# Patient Record
Sex: MALE | Race: WHITE | NOT HISPANIC OR LATINO | Employment: UNEMPLOYED | URBAN - METROPOLITAN AREA
[De-identification: names, ages, dates, MRNs, and addresses within clinical notes are randomized per-mention and may not be internally consistent; named-entity substitution may affect disease eponyms.]

---

## 2018-09-20 ENCOUNTER — OFFICE VISIT CONVERTED (OUTPATIENT)
Dept: FAMILY MEDICINE CLINIC | Facility: CLINIC | Age: 28
End: 2018-09-20
Attending: NURSE PRACTITIONER

## 2018-09-20 ENCOUNTER — CONVERSION ENCOUNTER (OUTPATIENT)
Dept: FAMILY MEDICINE CLINIC | Facility: CLINIC | Age: 28
End: 2018-09-20

## 2018-10-24 ENCOUNTER — CONVERSION ENCOUNTER (OUTPATIENT)
Dept: FAMILY MEDICINE CLINIC | Facility: CLINIC | Age: 28
End: 2018-10-24

## 2018-10-24 ENCOUNTER — OFFICE VISIT CONVERTED (OUTPATIENT)
Dept: FAMILY MEDICINE CLINIC | Facility: CLINIC | Age: 28
End: 2018-10-24
Attending: NURSE PRACTITIONER

## 2018-11-28 ENCOUNTER — OFFICE VISIT CONVERTED (OUTPATIENT)
Dept: FAMILY MEDICINE CLINIC | Facility: CLINIC | Age: 28
End: 2018-11-28
Attending: NURSE PRACTITIONER

## 2018-12-27 ENCOUNTER — OFFICE VISIT CONVERTED (OUTPATIENT)
Dept: FAMILY MEDICINE CLINIC | Facility: CLINIC | Age: 28
End: 2018-12-27
Attending: NURSE PRACTITIONER

## 2019-01-25 ENCOUNTER — CONVERSION ENCOUNTER (OUTPATIENT)
Dept: FAMILY MEDICINE CLINIC | Facility: CLINIC | Age: 29
End: 2019-01-25

## 2019-01-25 ENCOUNTER — OFFICE VISIT CONVERTED (OUTPATIENT)
Dept: FAMILY MEDICINE CLINIC | Facility: CLINIC | Age: 29
End: 2019-01-25
Attending: NURSE PRACTITIONER

## 2019-02-22 ENCOUNTER — OFFICE VISIT CONVERTED (OUTPATIENT)
Dept: FAMILY MEDICINE CLINIC | Facility: CLINIC | Age: 29
End: 2019-02-22
Attending: NURSE PRACTITIONER

## 2019-07-17 ENCOUNTER — OFFICE VISIT CONVERTED (OUTPATIENT)
Dept: FAMILY MEDICINE CLINIC | Facility: CLINIC | Age: 29
End: 2019-07-17
Attending: NURSE PRACTITIONER

## 2019-08-22 ENCOUNTER — OFFICE VISIT CONVERTED (OUTPATIENT)
Dept: FAMILY MEDICINE CLINIC | Facility: CLINIC | Age: 29
End: 2019-08-22
Attending: NURSE PRACTITIONER

## 2019-08-22 ENCOUNTER — HOSPITAL ENCOUNTER (OUTPATIENT)
Dept: FAMILY MEDICINE CLINIC | Facility: CLINIC | Age: 29
Discharge: HOME | End: 2019-08-22
Attending: NURSE PRACTITIONER

## 2019-08-22 LAB
ALBUMIN SERPL-MCNC: 4.1 G/DL (ref 3.5–5)
ALBUMIN/GLOB SERPL: 1.1 {RATIO} (ref 1.4–2.6)
ALP SERPL-CCNC: 106 U/L (ref 53–128)
ALT SERPL-CCNC: 79 U/L (ref 10–40)
ANION GAP SERPL CALC-SCNC: 17 MMOL/L (ref 8–19)
APPEARANCE UR: ABNORMAL
AST SERPL-CCNC: 84 U/L (ref 15–50)
BASOPHILS # BLD AUTO: 0.07 10*3/UL (ref 0–0.2)
BASOPHILS NFR BLD AUTO: 0.6 % (ref 0–3)
BILIRUB SERPL-MCNC: 0.35 MG/DL (ref 0.2–1.3)
BILIRUB UR QL: NEGATIVE
BUN SERPL-MCNC: 12 MG/DL (ref 5–25)
BUN/CREAT SERPL: 11 {RATIO} (ref 6–20)
CALCIUM SERPL-MCNC: 9.2 MG/DL (ref 8.7–10.4)
CHLORIDE SERPL-SCNC: 102 MMOL/L (ref 99–111)
CHOLEST SERPL-MCNC: 141 MG/DL (ref 107–200)
CHOLEST/HDLC SERPL: 5.9 {RATIO} (ref 3–6)
COLOR UR: ABNORMAL
CONV ABS IMM GRAN: 0.05 10*3/UL (ref 0–0.2)
CONV BACTERIA: NEGATIVE
CONV CO2: 25 MMOL/L (ref 22–32)
CONV COLLECTION SOURCE (UA): ABNORMAL
CONV CRYSTALS: ABNORMAL /[HPF]
CONV IMMATURE GRAN: 0.4 % (ref 0–1.8)
CONV TOTAL PROTEIN: 8 G/DL (ref 6.3–8.2)
CONV UROBILINOGEN IN URINE BY AUTOMATED TEST STRIP: 1 {EHRLICHU}/DL (ref 0.1–1)
CREAT UR-MCNC: 1.07 MG/DL (ref 0.7–1.2)
DEPRECATED RDW RBC AUTO: 41.8 FL (ref 35.1–43.9)
EOSINOPHIL # BLD AUTO: 0.15 10*3/UL (ref 0–0.7)
EOSINOPHIL # BLD AUTO: 1.3 % (ref 0–7)
ERYTHROCYTE [DISTWIDTH] IN BLOOD BY AUTOMATED COUNT: 13.3 % (ref 11.6–14.4)
GFR SERPLBLD BASED ON 1.73 SQ M-ARVRAT: >60 ML/MIN/{1.73_M2}
GLOBULIN UR ELPH-MCNC: 3.9 G/DL (ref 2–3.5)
GLUCOSE SERPL-MCNC: 99 MG/DL (ref 70–99)
GLUCOSE UR QL: NEGATIVE MG/DL
HCT VFR BLD AUTO: 49.7 % (ref 42–52)
HDLC SERPL-MCNC: 24 MG/DL (ref 40–60)
HGB BLD-MCNC: 15.9 G/DL (ref 14–18)
HGB UR QL STRIP: ABNORMAL
KETONES UR QL STRIP: ABNORMAL MG/DL
LDLC SERPL CALC-MCNC: 77 MG/DL (ref 70–100)
LEUKOCYTE ESTERASE UR QL STRIP: NEGATIVE
LYMPHOCYTES # BLD AUTO: 2.09 10*3/UL (ref 1–5)
LYMPHOCYTES NFR BLD AUTO: 17.9 % (ref 20–45)
MCH RBC QN AUTO: 27.5 PG (ref 27–31)
MCHC RBC AUTO-ENTMCNC: 32 G/DL (ref 33–37)
MCV RBC AUTO: 86 FL (ref 80–96)
MONOCYTES # BLD AUTO: 0.86 10*3/UL (ref 0.2–1.2)
MONOCYTES NFR BLD AUTO: 7.4 % (ref 3–10)
NEUTROPHILS # BLD AUTO: 8.47 10*3/UL (ref 2–8)
NEUTROPHILS NFR BLD AUTO: 72.4 % (ref 30–85)
NITRITE UR QL STRIP: NEGATIVE
NRBC CBCN: 0 % (ref 0–0.7)
OSMOLALITY SERPL CALC.SUM OF ELEC: 288 MOSM/KG (ref 273–304)
PH UR STRIP.AUTO: 6 [PH] (ref 5–8)
PLATELET # BLD AUTO: 322 10*3/UL (ref 130–400)
PMV BLD AUTO: 11 FL (ref 9.4–12.4)
POTASSIUM SERPL-SCNC: 4.5 MMOL/L (ref 3.5–5.3)
PROT UR QL: NEGATIVE MG/DL
RBC # BLD AUTO: 5.78 10*6/UL (ref 4.7–6.1)
RBC #/AREA URNS HPF: ABNORMAL /[HPF]
SODIUM SERPL-SCNC: 139 MMOL/L (ref 135–147)
SP GR UR: 1.03 (ref 1–1.03)
SQUAMOUS SPT QL MICRO: ABNORMAL /[HPF]
T4 FREE SERPL-MCNC: 1 NG/DL (ref 0.9–1.8)
TRIGL SERPL-MCNC: 199 MG/DL (ref 40–150)
TSH SERPL-ACNC: 3.92 M[IU]/L (ref 0.27–4.2)
VLDLC SERPL-MCNC: 40 MG/DL (ref 5–37)
WBC # BLD AUTO: 11.69 10*3/UL (ref 4.8–10.8)
WBC #/AREA URNS HPF: ABNORMAL /[HPF]

## 2019-09-25 ENCOUNTER — OFFICE VISIT CONVERTED (OUTPATIENT)
Dept: FAMILY MEDICINE CLINIC | Facility: CLINIC | Age: 29
End: 2019-09-25
Attending: NURSE PRACTITIONER

## 2019-10-29 ENCOUNTER — HOSPITAL ENCOUNTER (OUTPATIENT)
Dept: FAMILY MEDICINE CLINIC | Facility: CLINIC | Age: 29
Discharge: HOME | End: 2019-10-29
Attending: NURSE PRACTITIONER

## 2019-10-29 LAB
ANION GAP SERPL CALC-SCNC: 20 MMOL/L (ref 8–19)
BUN SERPL-MCNC: 9 MG/DL (ref 5–25)
BUN/CREAT SERPL: 8 {RATIO} (ref 6–20)
CALCIUM SERPL-MCNC: 10.1 MG/DL (ref 8.7–10.4)
CHLORIDE SERPL-SCNC: 100 MMOL/L (ref 99–111)
CONV CO2: 24 MMOL/L (ref 22–32)
CREAT UR-MCNC: 1.1 MG/DL (ref 0.7–1.2)
GFR SERPLBLD BASED ON 1.73 SQ M-ARVRAT: >60 ML/MIN/{1.73_M2}
GLUCOSE SERPL-MCNC: 78 MG/DL (ref 70–99)
OSMOLALITY SERPL CALC.SUM OF ELEC: 288 MOSM/KG (ref 273–304)
POTASSIUM SERPL-SCNC: 4.4 MMOL/L (ref 3.5–5.3)
SODIUM SERPL-SCNC: 140 MMOL/L (ref 135–147)

## 2019-10-30 ENCOUNTER — OFFICE VISIT CONVERTED (OUTPATIENT)
Dept: FAMILY MEDICINE CLINIC | Facility: CLINIC | Age: 29
End: 2019-10-30
Attending: NURSE PRACTITIONER

## 2019-10-30 ENCOUNTER — CONVERSION ENCOUNTER (OUTPATIENT)
Dept: FAMILY MEDICINE CLINIC | Facility: CLINIC | Age: 29
End: 2019-10-30

## 2020-02-03 ENCOUNTER — HOSPITAL ENCOUNTER (OUTPATIENT)
Dept: FAMILY MEDICINE CLINIC | Facility: CLINIC | Age: 30
Discharge: HOME | End: 2020-02-03
Attending: NURSE PRACTITIONER

## 2020-02-03 ENCOUNTER — CONVERSION ENCOUNTER (OUTPATIENT)
Dept: FAMILY MEDICINE CLINIC | Facility: CLINIC | Age: 30
End: 2020-02-03

## 2020-02-03 ENCOUNTER — OFFICE VISIT CONVERTED (OUTPATIENT)
Dept: FAMILY MEDICINE CLINIC | Facility: CLINIC | Age: 30
End: 2020-02-03
Attending: NURSE PRACTITIONER

## 2020-02-03 LAB
ALBUMIN SERPL-MCNC: 3.9 G/DL (ref 3.5–5)
ALBUMIN/GLOB SERPL: 1 {RATIO} (ref 1.4–2.6)
ALP SERPL-CCNC: 107 U/L (ref 53–128)
ALT SERPL-CCNC: 58 U/L (ref 10–40)
ANION GAP SERPL CALC-SCNC: 15 MMOL/L (ref 8–19)
AST SERPL-CCNC: 60 U/L (ref 15–50)
BILIRUB SERPL-MCNC: 0.33 MG/DL (ref 0.2–1.3)
BUN SERPL-MCNC: 7 MG/DL (ref 5–25)
BUN/CREAT SERPL: 7 {RATIO} (ref 6–20)
CALCIUM SERPL-MCNC: 9.6 MG/DL (ref 8.7–10.4)
CHLORIDE SERPL-SCNC: 99 MMOL/L (ref 99–111)
CHOLEST SERPL-MCNC: 150 MG/DL (ref 107–200)
CHOLEST/HDLC SERPL: 6 {RATIO} (ref 3–6)
CONV CO2: 25 MMOL/L (ref 22–32)
CONV TOTAL PROTEIN: 7.9 G/DL (ref 6.3–8.2)
CREAT UR-MCNC: 1.02 MG/DL (ref 0.7–1.2)
GFR SERPLBLD BASED ON 1.73 SQ M-ARVRAT: >60 ML/MIN/{1.73_M2}
GLOBULIN UR ELPH-MCNC: 4 G/DL (ref 2–3.5)
GLUCOSE SERPL-MCNC: 89 MG/DL (ref 70–99)
HDLC SERPL-MCNC: 25 MG/DL (ref 40–60)
LDLC SERPL CALC-MCNC: 103 MG/DL (ref 70–100)
OSMOLALITY SERPL CALC.SUM OF ELEC: 277 MOSM/KG (ref 273–304)
POTASSIUM SERPL-SCNC: 4.2 MMOL/L (ref 3.5–5.3)
PROLACTIN SERPL-MCNC: 13.83 NG/ML
SODIUM SERPL-SCNC: 135 MMOL/L (ref 135–147)
TRIGL SERPL-MCNC: 108 MG/DL (ref 40–150)
VLDLC SERPL-MCNC: 22 MG/DL (ref 5–37)

## 2021-01-28 ENCOUNTER — HOSPITAL ENCOUNTER (OUTPATIENT)
Dept: FAMILY MEDICINE CLINIC | Facility: CLINIC | Age: 31
Discharge: HOME | End: 2021-01-28
Attending: NURSE PRACTITIONER

## 2021-01-28 ENCOUNTER — OFFICE VISIT CONVERTED (OUTPATIENT)
Dept: FAMILY MEDICINE CLINIC | Facility: CLINIC | Age: 31
End: 2021-01-28
Attending: NURSE PRACTITIONER

## 2021-01-28 ENCOUNTER — CONVERSION ENCOUNTER (OUTPATIENT)
Dept: FAMILY MEDICINE CLINIC | Facility: CLINIC | Age: 31
End: 2021-01-28

## 2021-01-28 LAB
ALBUMIN SERPL-MCNC: 4 G/DL (ref 3.5–5)
ALBUMIN/GLOB SERPL: 1.1 {RATIO} (ref 1.4–2.6)
ALP SERPL-CCNC: 112 U/L (ref 53–128)
ALT SERPL-CCNC: 45 U/L (ref 10–40)
ANION GAP SERPL CALC-SCNC: 15 MMOL/L (ref 8–19)
AST SERPL-CCNC: 50 U/L (ref 15–50)
BASOPHILS # BLD AUTO: 0.05 10*3/UL (ref 0–0.2)
BASOPHILS NFR BLD AUTO: 0.5 % (ref 0–3)
BILIRUB SERPL-MCNC: 0.38 MG/DL (ref 0.2–1.3)
BUN SERPL-MCNC: 12 MG/DL (ref 5–25)
BUN/CREAT SERPL: 10 {RATIO} (ref 6–20)
CALCIUM SERPL-MCNC: 9.8 MG/DL (ref 8.7–10.4)
CHLORIDE SERPL-SCNC: 99 MMOL/L (ref 99–111)
CHOLEST SERPL-MCNC: 137 MG/DL (ref 107–200)
CHOLEST/HDLC SERPL: 5.1 {RATIO} (ref 3–6)
CONV ABS IMM GRAN: 0.04 10*3/UL (ref 0–0.2)
CONV CO2: 28 MMOL/L (ref 22–32)
CONV IMMATURE GRAN: 0.4 % (ref 0–1.8)
CONV TOTAL PROTEIN: 7.7 G/DL (ref 6.3–8.2)
CREAT UR-MCNC: 1.2 MG/DL (ref 0.7–1.2)
DEPRECATED RDW RBC AUTO: 42 FL (ref 35.1–43.9)
EOSINOPHIL # BLD AUTO: 0.13 10*3/UL (ref 0–0.7)
EOSINOPHIL # BLD AUTO: 1.2 % (ref 0–7)
ERYTHROCYTE [DISTWIDTH] IN BLOOD BY AUTOMATED COUNT: 14 % (ref 11.6–14.4)
GFR SERPLBLD BASED ON 1.73 SQ M-ARVRAT: >60 ML/MIN/{1.73_M2}
GLOBULIN UR ELPH-MCNC: 3.7 G/DL (ref 2–3.5)
GLUCOSE SERPL-MCNC: 138 MG/DL (ref 70–99)
HCT VFR BLD AUTO: 49.7 % (ref 42–52)
HDLC SERPL-MCNC: 27 MG/DL (ref 40–60)
HGB BLD-MCNC: 15.9 G/DL (ref 14–18)
LDLC SERPL CALC-MCNC: 85 MG/DL (ref 70–100)
LYMPHOCYTES # BLD AUTO: 1.69 10*3/UL (ref 1–5)
LYMPHOCYTES NFR BLD AUTO: 15.6 % (ref 20–45)
MCH RBC QN AUTO: 26.5 PG (ref 27–31)
MCHC RBC AUTO-ENTMCNC: 32 G/DL (ref 33–37)
MCV RBC AUTO: 82.8 FL (ref 80–96)
MONOCYTES # BLD AUTO: 0.49 10*3/UL (ref 0.2–1.2)
MONOCYTES NFR BLD AUTO: 4.5 % (ref 3–10)
NEUTROPHILS # BLD AUTO: 8.41 10*3/UL (ref 2–8)
NEUTROPHILS NFR BLD AUTO: 77.8 % (ref 30–85)
NRBC CBCN: 0 % (ref 0–0.7)
OSMOLALITY SERPL CALC.SUM OF ELEC: 288 MOSM/KG (ref 273–304)
PLATELET # BLD AUTO: 290 10*3/UL (ref 130–400)
PMV BLD AUTO: 10.5 FL (ref 9.4–12.4)
POTASSIUM SERPL-SCNC: 3.8 MMOL/L (ref 3.5–5.3)
RBC # BLD AUTO: 6 10*6/UL (ref 4.7–6.1)
SODIUM SERPL-SCNC: 138 MMOL/L (ref 135–147)
TRIGL SERPL-MCNC: 123 MG/DL (ref 40–150)
TSH SERPL-ACNC: 3.52 M[IU]/L (ref 0.27–4.2)
VLDLC SERPL-MCNC: 25 MG/DL (ref 5–37)
WBC # BLD AUTO: 10.81 10*3/UL (ref 4.8–10.8)

## 2021-01-31 LAB
EST. AVERAGE GLUCOSE BLD GHB EST-MCNC: 111 MG/DL
HBA1C MFR BLD: 5.5 % (ref 3.5–5.7)

## 2021-03-01 ENCOUNTER — OFFICE VISIT CONVERTED (OUTPATIENT)
Dept: FAMILY MEDICINE CLINIC | Facility: CLINIC | Age: 31
End: 2021-03-01
Attending: NURSE PRACTITIONER

## 2021-04-01 ENCOUNTER — CONVERSION ENCOUNTER (OUTPATIENT)
Dept: FAMILY MEDICINE CLINIC | Facility: CLINIC | Age: 31
End: 2021-04-01

## 2021-04-01 ENCOUNTER — OFFICE VISIT CONVERTED (OUTPATIENT)
Dept: FAMILY MEDICINE CLINIC | Facility: CLINIC | Age: 31
End: 2021-04-01
Attending: NURSE PRACTITIONER

## 2021-04-01 ENCOUNTER — HOSPITAL ENCOUNTER (OUTPATIENT)
Dept: FAMILY MEDICINE CLINIC | Facility: CLINIC | Age: 31
Discharge: HOME | End: 2021-04-01
Attending: NURSE PRACTITIONER

## 2021-04-01 LAB
25(OH)D3 SERPL-MCNC: 17.8 NG/ML (ref 30–100)
ALBUMIN SERPL-MCNC: 4.1 G/DL (ref 3.5–5)
ALBUMIN/GLOB SERPL: 1.1 {RATIO} (ref 1.4–2.6)
ALP SERPL-CCNC: 106 U/L (ref 53–128)
ALT SERPL-CCNC: 54 U/L (ref 10–40)
ANION GAP SERPL CALC-SCNC: 13 MMOL/L (ref 8–19)
APPEARANCE UR: CLEAR
AST SERPL-CCNC: 58 U/L (ref 15–50)
BASOPHILS # BLD AUTO: 0.06 10*3/UL (ref 0–0.2)
BASOPHILS NFR BLD AUTO: 0.6 % (ref 0–3)
BILIRUB SERPL-MCNC: 0.39 MG/DL (ref 0.2–1.3)
BILIRUB UR QL: NEGATIVE
BUN SERPL-MCNC: 9 MG/DL (ref 5–25)
BUN/CREAT SERPL: 8 {RATIO} (ref 6–20)
CALCIUM SERPL-MCNC: 9.3 MG/DL (ref 8.7–10.4)
CHLORIDE SERPL-SCNC: 102 MMOL/L (ref 99–111)
CHOLEST SERPL-MCNC: 150 MG/DL (ref 107–200)
CHOLEST/HDLC SERPL: 4.8 {RATIO} (ref 3–6)
COLOR UR: ABNORMAL
CONV ABS IMM GRAN: 0.05 10*3/UL (ref 0–0.2)
CONV BACTERIA: NEGATIVE
CONV CO2: 25 MMOL/L (ref 22–32)
CONV COLLECTION SOURCE (UA): ABNORMAL
CONV HYALINE CASTS IN URINE MICRO: ABNORMAL /[LPF]
CONV IMMATURE GRAN: 0.5 % (ref 0–1.8)
CONV TOTAL PROTEIN: 7.9 G/DL (ref 6.3–8.2)
CONV UROBILINOGEN IN URINE BY AUTOMATED TEST STRIP: 1 {EHRLICHU}/DL (ref 0.1–1)
CREAT UR-MCNC: 1.08 MG/DL (ref 0.7–1.2)
DEPRECATED RDW RBC AUTO: 43.1 FL (ref 35.1–43.9)
EOSINOPHIL # BLD AUTO: 0.18 10*3/UL (ref 0–0.7)
EOSINOPHIL # BLD AUTO: 1.7 % (ref 0–7)
ERYTHROCYTE [DISTWIDTH] IN BLOOD BY AUTOMATED COUNT: 14.4 % (ref 11.6–14.4)
GFR SERPLBLD BASED ON 1.73 SQ M-ARVRAT: >60 ML/MIN/{1.73_M2}
GLOBULIN UR ELPH-MCNC: 3.8 G/DL (ref 2–3.5)
GLUCOSE SERPL-MCNC: 92 MG/DL (ref 70–99)
GLUCOSE UR QL: NEGATIVE MG/DL
HCT VFR BLD AUTO: 48.8 % (ref 42–52)
HDLC SERPL-MCNC: 31 MG/DL (ref 40–60)
HGB BLD-MCNC: 15.6 G/DL (ref 14–18)
HGB UR QL STRIP: NEGATIVE
KETONES UR QL STRIP: NEGATIVE MG/DL
LDLC SERPL CALC-MCNC: 90 MG/DL (ref 70–100)
LEUKOCYTE ESTERASE UR QL STRIP: NEGATIVE
LYMPHOCYTES # BLD AUTO: 2.03 10*3/UL (ref 1–5)
LYMPHOCYTES NFR BLD AUTO: 18.9 % (ref 20–45)
MCH RBC QN AUTO: 26.5 PG (ref 27–31)
MCHC RBC AUTO-ENTMCNC: 32 G/DL (ref 33–37)
MCV RBC AUTO: 82.9 FL (ref 80–96)
MONOCYTES # BLD AUTO: 0.59 10*3/UL (ref 0.2–1.2)
MONOCYTES NFR BLD AUTO: 5.5 % (ref 3–10)
NEUTROPHILS # BLD AUTO: 7.84 10*3/UL (ref 2–8)
NEUTROPHILS NFR BLD AUTO: 72.8 % (ref 30–85)
NITRITE UR QL STRIP: NEGATIVE
NRBC CBCN: 0 % (ref 0–0.7)
OSMOLALITY SERPL CALC.SUM OF ELEC: 280 MOSM/KG (ref 273–304)
PH UR STRIP.AUTO: 5.5 [PH] (ref 5–8)
PLATELET # BLD AUTO: 347 10*3/UL (ref 130–400)
PMV BLD AUTO: 10.7 FL (ref 9.4–12.4)
POTASSIUM SERPL-SCNC: 4.4 MMOL/L (ref 3.5–5.3)
PROT UR QL: 100 MG/DL
RBC # BLD AUTO: 5.89 10*6/UL (ref 4.7–6.1)
RBC #/AREA URNS HPF: ABNORMAL /[HPF]
SODIUM SERPL-SCNC: 136 MMOL/L (ref 135–147)
SP GR UR: 1.02 (ref 1–1.03)
SQUAMOUS SPT QL MICRO: ABNORMAL /[HPF]
T4 FREE SERPL-MCNC: 1.3 NG/DL (ref 0.9–1.8)
TRIGL SERPL-MCNC: 145 MG/DL (ref 40–150)
TSH SERPL-ACNC: 2.46 M[IU]/L (ref 0.27–4.2)
VLDLC SERPL-MCNC: 29 MG/DL (ref 5–37)
WBC # BLD AUTO: 10.75 10*3/UL (ref 4.8–10.8)
WBC #/AREA URNS HPF: ABNORMAL /[HPF]

## 2021-04-02 LAB
CONV ANTI MICROSOMAL AB: <9 IU/ML (ref 0–34)
FOLATE SERPL-MCNC: 13.2 NG/ML (ref 4.8–20)
THYROGLOBULIN ANTIBODY: <1 IU/ML (ref 0–0.9)
VIT B12 SERPL-MCNC: 442 PG/ML (ref 211–911)

## 2021-04-14 ENCOUNTER — CONVERSION ENCOUNTER (OUTPATIENT)
Dept: CARDIOLOGY | Facility: CLINIC | Age: 31
End: 2021-04-14

## 2021-04-14 ENCOUNTER — OFFICE VISIT CONVERTED (OUTPATIENT)
Dept: CARDIOLOGY | Facility: CLINIC | Age: 31
End: 2021-04-14
Attending: INTERNAL MEDICINE

## 2021-05-07 NOTE — PROGRESS NOTES
Progress Note      Patient Name: Anthony Tay   Patient ID: [id number]   Sex: Male   YOB: 1990        Visit Date: April 1, 2021    Provider: RHODA Walker   Location: [facility]   Location Address: [address] [place] [address]   [place]  [address]   Location Phone: [phone]          Chief Complaint     Wellness physical       History Of Present Illness  Anthony Tay is a 30 year old /White male who presents for evaluation and treatment of:      Wellness physical     Pt has always lived with his mother who has been leaving him home for long periods of time and not taking him to appointments so he is now living on his brother's property in a 'tiny home' and is on disability through the state - got it as a child for tourettes, ADD, etc then when turned 18 he lost due to mother not filling out paperwork - then got it back 2017 for schizoaffective disorder     HTN: elevated in office at 160/100 - pt states it was previously higher - takes medication daily as prescribed - denies HA recently, previously daily headaches - feels like heart races at times - gets 'winded easily'     ADD, Schizoaffective disorder, depression: pt is established with [facility] -    Hypothyroidism: constantly hungry - obesity    Left hip pain - worse with sitting in certain position     bilateral foot pain - 'feels like a rubber band that will not stretch when he stands' - helping some on the farm       Past Medical History  Disease Name Date Onset Notes   ADD (attention deficit disorder) --  --    Depression --  --    Hypertension --  --    Lyme disease --  --    Schizoaffective disorder, chronic condition --  --    Tourette disorder --  --          Past Surgical History  Procedure Name Date Notes   *No Past Surgical History --  --          Medication List  Name Date Started Instructions   amlodipine 10 mg oral tablet  take 1 tablet (10 mg) by oral route once daily   hydralazine 50 mg oral  "tablet  take 1 tablet (50 mg) by oral route 2 times per day with food   levothyroxine 50 mcg oral capsule  take 1 capsule (50 mcg) by oral route once daily   losartan 100 mg oral tablet  take 1 tablet (100 mg) by oral route once daily   mirtazapine 7.5 mg oral tablet  take 1 tablet (7.5 mg) by oral route once daily at bedtime   Vraylar 1.5 mg oral capsule  take 1 capsule (1.5 mg) by oral route once daily         Allergy List  Allergen Name Date Reaction Notes   Latex --  --  --    Shellfish allergy --  --  --          Family Medical History  Disease Name Relative/Age Notes   Heart Disease Father/   Father passed in 40's from heart disease   Hypertension Mother/   --          Social History  Finding Status Start/Stop Quantity Notes   Tobacco Light --/-- --  --          Review of Systems  · Constitutional  o Admits  o : fatigue, chills if he gets hot  o Denies  o : fever, headache, body aches  · Cardiovascular  o Admits  o : rapid heart rate, swelling (feet, ankles, hands)  o Denies  o : chest pain  · Gastrointestinal  o Denies  o : nausea, vomiting, diarrhea, constipation  · Integument  o Admits  o : skin dryness of feet and hands, hx of sores of scalp that improved with changing shampoo  o Denies  o : hair growth change  · Neurologic  o Admits  o : tremors  · Musculoskeletal  o * See HPI  · Endocrine  o Admits  o : heat intolerance  o Denies  o : loss of hair, constipation, cold intolerance      Vitals  Date Time BP Position Site L\R Cuff Size HR RR TEMP (F) WT  HT  BMI kg/m2 BSA m2 O2 Sat FR L/min FiO2 HC       04/01/2021 09:15 /100 Sitting    77 - R  96.3 400lbs 0oz 6'  1.5\" 52.06 3.07 98 %      04/01/2021 09:54 /100 Sitting                       Physical Examination  · Constitutional  o Appearance  o : obese, well developed  · Eyes  o Conjunctivae  o : conjunctivae normal  o Pupils and Irises  o : pupils equal and round, pupils reactive to light bilaterally  · Neck  o Inspection/Palpation  o : " supple  o Thyroid  o : no thyromegaly  · Respiratory  o Respiratory Effort  o : breathing unlabored  o Auscultation of Lungs  o : clear to ascultation  · Cardiovascular  o Heart  o :   § Auscultation of Heart  § : regular rate, abnormal rhythm present, no murmurs present, no pericardial friction rub  o Peripheral Vascular System  o :   § Extremities  § : no edema  · Lymphatic  o Neck  o : no lymphadenopathy present  · Musculoskeletal  o General  o :   § General Musculoskeletal  § : tenderness with palpation of plantar surface of foot. NL ROM of left hip and nontender to palpate. Muscle tone, strength, and development grossly normal.  · Skin and Subcutaneous Tissue  o General Inspection  o : NL tone  · Neurologic  o Gait and Station  o :   § Gait Screening  § : normal gait  · Psychiatric  o Mood and Affect  o : mood normal, affect appropriate              Assessment  · Essential hypertension     401.9/I10  · Fatigue     780.79/R53.83  · Hypothyroidism     244.9/E03.9  · Major depressive disorder     296.20/F32.2  · Schizoaffective disorder     295.70/F25.9  · Hip pain, left     719.45/M25.552  · Plantar fasciitis, bilateral     728.71/M72.2  · Irregular heart rhythm     427.9/I49.9      Plan  · Orders  o HTN/Lipid Panel (CMP, Lipid) [facility] (98835, 48028) - 401.9/I10 - 04/01/2021  o CBC with Auto Diff [facility] (90457) - 401.9/I10 - 04/01/2021  o B12 Folate levels (B12FO) - 780.79/R53.83 - 04/01/2021  o Thyroid Profile (THYII) - 244.9/E03.9 - 04/01/2021  o Thyroid antithyroglobulin ab (82847) - 244.9/E03.9 - 04/01/2021  o Thyroid antibody panel (32662) - 244.9/E03.9 - 04/01/2021  o ACO-18: Positive screen for clinical depression using a standardized tool and a follow-up plan documented () - 296.20/F32.2 - 04/01/2021  o Urinalysis with Reflex Microscopy if abnormal ([facility]) (95804) - 401.9/I10 - 04/01/2021  o Vitamin D (25-Hydroxy) Level (77481) - 780.79/R53.83 - 04/01/2021  o ACO-39: Current medications updated and reviewed  (1159F, ) - - 04/01/2021  o EKG (Recording only) [facility] (00726) - 427.9/I49.9 - 04/01/2021  o CARDIOLOGY CONSULTATION (CARDI) - 427.9/I49.9 - 04/01/2021  · Medications  o diclofenac sodium 75 mg oral tablet,delayed release (DR/EC)   SIG: take 1 tablet (75 mg) by oral route 2 times per day for 30 days   DISP: (60) Tablet with 2 refills  Prescribed on 04/01/2021     o amlodipine 5 mg oral tablet   SIG: take 1 tablet (5 mg) by oral route once daily for 90 days   DISP: (90) Tablet with 0 refills  Adjusted on 04/01/2021     o hydralazine 100 mg oral tablet   SIG: take 1 tablet (100 mg) by oral route 2 times per day with food for 30 days   DISP: (60) Tablet with 2 refills  Adjusted on 04/01/2021     o levothyroxine 50 mcg oral capsule   SIG: take 1 capsule (50 mcg) by oral route once daily for 90 days   DISP: (90) Capsule with 0 refills  Adjusted on 04/01/2021     o losartan 100 mg oral tablet   SIG: take 1 tablet (100 mg) by oral route once daily for 90 days   DISP: (90) Tablet with 0 refills  Adjusted on 04/01/2021     o Medications have been Reconciled  o Transition of Care or Provider Policy  · Instructions  o Patient was educated/instructed on their diagnosis, treatment and medications prior to discharge from the clinic today.  o Peripheral edema: decrease amlodipine and increase Hydralazine - labs pending - follow up with Cardiology  o Use tennis ball and frozen water bottle to massage feet.   · Disposition  o Follow up as needed.            Electronically Signed by: RHODA Walker -Author on April 1, 2021 04:50:21 PM

## 2021-05-09 VITALS
HEART RATE: 77 BPM | WEIGHT: 315 LBS | SYSTOLIC BLOOD PRESSURE: 160 MMHG | HEIGHT: 73 IN | TEMPERATURE: 96.3 F | BODY MASS INDEX: 41.75 KG/M2 | DIASTOLIC BLOOD PRESSURE: 100 MMHG | OXYGEN SATURATION: 98 %

## 2021-05-11 NOTE — H&P
History and Physical      Patient Name: Anthony Tay   Patient ID: [id number]   Sex: Male   YOB: 1990    Primary Care Provider: Melly DUONG   Referring Provider: Melly DUONG    Visit Date: April 14, 2021    Provider: Wil Mendieta MD   Location: [facility]   Location Address: [address] [place] [address], [address]   [place]  [id number]   Location Phone: [phone]          History Of Present Illness  Consult requested by: Hillary DUONG   Anthony Tay is a 31 year old /White male that I saw in the office today. This is a 31-year-old white male. He has no previous cardiac history. He has a history of hypertension and is morbidly obese. He has had intermittent fluttering in his chest, more to the right shoulder, lasting a few seconds at a time. He has shortness of breath with exertion. He recently had an EKG in the primary care office which showed frequent PVCs and a cardiac evaluation has been requested.   PAST MEDICAL HISTORY: Morbid obesity; Hypertension; Schizoaffective.   PSYCHOSOCIAL HISTORY: Denies alcohol use. Currently smokes 1/2 pack per day. Daily caffeine.   FAMILY HISTORY: Positive for hypertension. Negative for premature CAD.   CURRENT MEDICATIONS: Mirtazapine 15 mg at night; Vraylar daily; levothyroxine 50 mcg daily; amlodipine 5 mg daily; diclofenac 75 mg b.i.d.; losartan 100 mg daily.   ALLERGIES: No known drug allergies. Allergy to shrimp.       Review of Systems  · Constitutional  o Admits  o : fatigue, good general health lately  o Denies  o : recent weight changes   · Eyes  o Denies  o : double vision  · HENT  o Denies  o : hearing loss or ringing, chronic sinus problem, swollen glands in neck  · Cardiovascular  o Admits  o : chest pain, swelling (feet, ankles, hands), shortness of breath while walking or lying flat  o Denies  o : palpitations (fast, fluttering, or skipping beats)  · Respiratory  o Denies  o : chronic or frequent  "cough, asthma or wheezing, COPD  · Gastrointestinal  o Denies  o : ulcers, nausea or vomiting  · Neurologic  o Denies  o : lightheaded or dizzy, stroke, headaches  · Musculoskeletal  o Admits  o : back pain  o Denies  o : joint pain  · Endocrine  o Admits  o : heat or cold intolerance, excessive thirst or urination  o Denies  o : thyroid disease, diabetes  · Heme-Lymph  o Denies  o : bleeding or bruising tendency, anemia      Vitals  Date Time BP Position Site L\R Cuff Size HR RR TEMP (F) WT  HT  BMI kg/m2 BSA m2 O2 Sat FR L/min FiO2 HC       04/14/2021 10:57 /102 Sitting    56 - R   400lbs 0oz 6'  5\" 47.43 3.14       04/14/2021 10:47 /88 Sitting    56 - R   400lbs 0oz 6'  5\" 47.43 3.14             Physical Examination  · Constitutional  o Appearance  o : Morbidly obese white male, pleasant, in no acute distress.  · Head and Face  o HEENT  o : No pallor, anicteric. Eyes normal. Moist mucous membranes.  · Neck  o Inspection/Palpation  o : Supple.   o Jugular Veins  o : No JVD. No carotid bruits.  · Respiratory  o Auscultation of Lungs  o : Clear to auscultation bilaterally. No crackles or wheezing.  · Cardiovascular  o Heart  o : S1, S2 is normally heard. No S3. No murmur, rubs, or gallops.  · Gastrointestinal  o Abdominal Examination  o : Soft, non-distended. No palpable hepatosplenomegaly. Bowel sounds heard in all four quadrants.  · Musculoskeletal  o General  o : Normal muscle tone and strength.  · Skin and Subcutaneous Tissue  o General Inspection  o : No skin rashes.  · Extremities  o Extremities  o : Warm and well perfused. Distal pulses present. No pitting pedal edema.  · EKG  o EKG  o : His EKG recently showed sinus rhythm with ventricular trigeminy, no ST changes.   · Labs  o Labs  o : Recent lipid panel was normal. Hemoglobin 15.6. Chemistry normal.           Assessment     1.  Abnormal EKG with frequent PVCs.  The patient is mildly symptomatic from this.   2.  Dyspnea on exertion.  3.  " Hypertension - Mildly uncontrolled today.   4.  Morbid obesity.          Plan     1.  I had a lengthy discussion today with the patient regarding a nutritional approach to weight loss.  We set up        a daily calorie guideline and some tips to help him lose weight slowly.    2.  Additionally, I counseled him for about 5 minutes on quitting smoking.  He understands the risks and        benefits and is going to give this some thought but has not yet picked a quit date.    3.  An echocardiogram and Holter monitor will be scheduled.    4.  I am going to follow up with him in 3 months otherwise.      He is agreeable with this plan.     ELISEO Mendieta MD  CBD/rt             Electronically Signed by: Selene Ortiz-, Other -Author on April 20, 2021 02:58:12 PM  Electronically Co-signed by: Wil Mendieta MD -Reviewer on April 21, 2021 06:54:41 AM

## 2021-05-13 ENCOUNTER — OFFICE VISIT CONVERTED (OUTPATIENT)
Dept: CARDIOLOGY | Facility: CLINIC | Age: 31
End: 2021-05-13
Attending: INTERNAL MEDICINE

## 2021-05-14 VITALS
SYSTOLIC BLOOD PRESSURE: 156 MMHG | HEART RATE: 72 BPM | DIASTOLIC BLOOD PRESSURE: 112 MMHG | HEIGHT: 76 IN | TEMPERATURE: 98 F | WEIGHT: 315 LBS | BODY MASS INDEX: 38.36 KG/M2 | OXYGEN SATURATION: 97 %

## 2021-05-14 VITALS
RESPIRATION RATE: 24 BRPM | BODY MASS INDEX: 38.36 KG/M2 | HEART RATE: 127 BPM | WEIGHT: 315 LBS | OXYGEN SATURATION: 94 % | DIASTOLIC BLOOD PRESSURE: 124 MMHG | SYSTOLIC BLOOD PRESSURE: 191 MMHG | HEIGHT: 76 IN | SYSTOLIC BLOOD PRESSURE: 188 MMHG | TEMPERATURE: 98.2 F | DIASTOLIC BLOOD PRESSURE: 131 MMHG

## 2021-05-14 VITALS
DIASTOLIC BLOOD PRESSURE: 102 MMHG | BODY MASS INDEX: 37.19 KG/M2 | HEIGHT: 77 IN | WEIGHT: 315 LBS | HEART RATE: 56 BPM | SYSTOLIC BLOOD PRESSURE: 138 MMHG

## 2021-05-14 NOTE — PROGRESS NOTES
"   Progress Note      Patient Name: Anthony Tay   Patient ID: [id number]   Sex: Male   YOB: 1990    Primary Care Provider: Melly DUONG   Referring Provider: Melly DUONG    Visit Date: March 1, 2021    Provider: RHODA Luis   Location: [facility]   Location Address: [address] [place] [address]  [id number]   Location Phone: [phone]          Chief Complaint  · 1 month follow up for Anxiety, Depression, HTN      History Of Present Illness  Anthony Tay is a 30 year old /White male who presents for evaluation and treatment of:      1 month follow up for Anxiety, Depression, HTN    Pt does not check b/p at home.  Pt reports h/a and eye pain have improved greatly.  Htn improved. pt reports that he has only missed 1 dose of med in the last week.     Pt has first appt with [facility] next week.  Pt reports he is out of Zoloft that was prescribed by [facility].  Pt states, \"my mom has gone off the deep end and my brother is trying to help me.\"     Pt c/o bilateral ear pain and itching.    flu shot- declined           Past Medical History  Disease Name Date Onset Notes   *Other --  Tourette's    Allergic rhinitis --  --    Anxiety --  --    Anxiety --  --    Asperger's syndrome --  --    Colon Polyps 7/18/14 --    Depression --  --    GERD 01/23/2015 --    Hematuria (Microscopic) 03/25/2014 03/25/2014 large blood, > 300 protein u/a in office on repeat from outpt labs.   Hypertension --  --    Resistant hypertension 02/03/2020 --    Tourette's disorder 03/06/2014 --          Past Surgical History  Procedure Name Date Notes   Colonoscopy 7/18/14 --    Cystoscopy 4/2014 WNL   Polypectomy 7/18/14 --          Medication List  Name Date Started Instructions   amlodipine 10 mg oral tablet 03/01/2021 take 1 tablet (10 mg) by oral route once daily for 90 days   aripiprazole 15 mg oral tablet  take 1 tablet (15 mg) by oral route once " daily   cetirizine 10 mg oral tablet 03/01/2021 take 1 tablet by oral route once a day (at bedtime) for 90 days   hydralazine 50 mg oral tablet 03/01/2021 take 1 tablet (50 mg) by oral route 2 times per day with food for 30 days   losartan 100 mg oral tablet 03/01/2021 take 1 tablet (100 mg) by oral route once daily for 90 days   metoprolol succinate 100 mg oral tablet extended release 24 hr 01/28/2021 take 1 tablet by oral route 2 times a day for 90 days   sertraline 25 mg oral tablet  take 1 tablet (25 mg) by oral route once daily   Synthroid 50 mcg oral tablet 03/01/2021 take 1 tablet (50 mcg) by oral route once daily for 90 days   triamterene-hydrochlorothiazid 37.5-25 mg oral capsule 01/28/2021 take 1 capsule by oral route once a day (in the morning) for 30 days         Allergy List  Allergen Name Date Reaction Notes   morphine --  --  --        Allergies Reconciled  Family Medical History  Disease Name Relative/Age Notes   Stroke Uncle/   --    Diabetes Mellitus, Type II Uncle/   --          Social History  Finding Status Start/Stop Quantity Notes   Alcohol Never --/-- --  --    Tobacco Former --/29 1/2 ppd --          Immunizations  NameDate Admin Mfg Trade Name Lot Number Route Inj VIS Given VIS Publication   Hepatitis A09/20/2018 SKB HAVRIX-ADULT [id number] IM  09/20/2018 10/25/2011   Comments: Pt tolerated   InfluenzaRefused 10/30/2019 NE Not Entered  NE NE     Comments: Pt declined         Review of Systems  · Constitutional  o Denies  o : fatigue, fever, weight gain, weight loss, chills  · HENT  o Denies  o : ear pain, sore throat  · Cardiovascular  o Denies  o : chest Pain, palpitations, edema (swelling)  · Respiratory  o Denies  o : frequent cough, shortness of breath  · Gastrointestinal  o Denies  o : nausea, vomiting, changes in bowel habits  · Genitourinary  o Denies  o : dysuria, urinary frequency, urinary urgency, polyuria  · Neurologic  o Denies  o : headache, tingling or numbness,  "dizziness  · Musculoskeletal  o Denies  o : joint pain, myalgias  · Endocrine  o Denies  o : polydipsia, polyphagia  · Psychiatric  o Denies  o : mood changes, memory changes, SI/HI  · Allergic-Immunologic  o Denies  o : eczema, seasonal allergies, urticaria      Vitals  Date Time BP Position Site L\R Cuff Size HR RR TEMP (F) WT  HT  BMI kg/m2 BSA m2 O2 Sat FR L/min FiO2 HC       01/28/2021 07:52 /131 Sitting    127 - R 24 98.2 394lbs 5oz 6'  4\" 48 3.1 94 %      03/01/2021 07:40 /112 Sitting    72 - R  98 399lbs 0oz 6'  4\" 48.57 3.12 97 %  21%          Physical Examination  · Constitutional  o Appearance  o : well-nourished, in no acute distress  · Eyes  o Conjunctivae  o : conjunctivae normal  o Sclerae  o : sclerae white  o Pupils and Irises  o : pupils equal and round  o Eyelids/Ocular Adnexae  o : eyelid appearance normal, no exudates present  · Ears, Nose, Mouth and Throat  o Ears  o :   § External Ears  § : external auditory canal appearance within normal limits, no discharge present  § Otoscopic Examination  § : tympanic membrane appearance within normal limits bilaterally, Left TM injected   o Nose  o :   § External Nose  § : appearance normal  § Intranasal Exam  § : mucosa within normal limits, vestibules normal, no intranasal lesions present, septum midline, sinuses non tender to palpation  § Nasopharynx  § : no discharge present  o Oral Cavity  o :   § Oral Mucosa  § : oral mucosa normal  § Lips  § : lip appearance normal  § Teeth  § : normal dentation for age  o Throat  o :   § Oropharynx  § : no inflammation or lesions present, tonsils within normal limits  · Neck  o Inspection/Palpation  o : normal appearance, no masses or tenderness, trachea midline  o Range of Motion  o : cervical range of motion within normal limits  o Thyroid  o : gland size normal, nontender, no nodules or masses present on palpation  · Respiratory  o Respiratory Effort  o : breathing unlabored  o Inspection of " Chest  o : normal appearance  o Auscultation of Lungs  o : normal breath sounds throughout inspiration and expiration  · Cardiovascular  o Heart  o :   § Auscultation of Heart  § : regular rate and rhythm, no murmurs, gallops or rubs  o Peripheral Vascular System  o :   § Carotid Arteries  § : normal pulses bilaterally, no bruits present  · Gastrointestinal  o Abdominal Examination  o : abdomen nontender to palpation, tone normal without rigidity or guarding, no masses present, bowel sounds present  · Skin and Subcutaneous Tissue  o General Inspection  o : no rashes or lesions present, no areas of discoloration  o Body Hair  o : hair normal for age, general body hair distribution normal for age  o Digits and Nails  o : no clubbing, cyanosis, deformities or edema present, normal appearing nails  · Neurologic  o Mental Status Examination  o :   § Orientation  § : grossly oriented to person, place and time  o Gait and Station  o : normal gait, able to stand without difficulty  · Psychiatric  o Judgement and Insight  o : judgment and insight intact  o Mood and Affect  o : mood normal, affect appropriate          Assessment  · Screening for depression     V79.0/Z13.89  · Allergic rhinitis due to allergen     477.9/J30.9  · Anxiety disorder     300.00/F41.9  will verify dosage Zoloft with pharmacy.   · Depression     311/F32.9  · Essential hypertension     401.9/I10  · Class 3 severe obesity due to excess calories with serious comorbidity and body mass index (BMI) of 45.0 to 49.9 in adult       Morbid (severe) obesity due to excess calories     278.01/E66.01  Body mass index [BMI] 45.0-49.9, adult     278.01/Z68.42    Problems Reconciled  Plan  · Orders  o ACO-18: Positive screen for clinical depression using a standardized tool and a follow-up plan documented () - V79.0/Z13.89 - 03/01/2021  o ACO-14: Influenza immunization was not administered for reasons documented [facility] () - - 03/01/2021   pt  declined  o ACO-39: Current medications updated and reviewed (, 1159F) - - 03/01/2021  · Medications  o hydralazine 50 mg oral tablet   SIG: take 1 tablet (50 mg) by oral route 2 times per day with food for 30 days   DISP: (60) Tablet with 1 refills  Adjusted on 03/01/2021     o Synthroid 50 mcg oral tablet   SIG: take 1 tablet (50 mcg) by oral route once daily for 90 days   DISP: (90) Tablet with 1 refills  Adjusted on 03/01/2021     o amlodipine 10 mg oral tablet   SIG: take 1 tablet (10 mg) by oral route once daily for 90 days   DISP: (90) Tablet with 1 refills  Refilled on 03/01/2021     o cetirizine 10 mg oral tablet   SIG: take 1 tablet by oral route once a day (at bedtime) for 90 days   DISP: (90) Tablet with 1 refills  Refilled on 03/01/2021     o losartan 100 mg oral tablet   SIG: take 1 tablet (100 mg) by oral route once daily for 90 days   DISP: (90) Tablet with 1 refills  Refilled on 03/01/2021     o trazodone 150 mg oral tablet   SIG: take 1 tablet (150 mg) by oral route once daily at bedtime   DISP: (180) tablets with 1 refills  Discontinued on 03/01/2021     o Medications have been Reconciled  o Transition of Care or Provider Policy  · Instructions  o Depression Screen completed and scanned into the EMR under the designated folder within the patient's documents.  o Patient was educated/instructed on their diagnosis, treatment and medications prior to discharge from the clinic today.  · Disposition  o Return to Office in 1 month.            Electronically Signed by: RHODA Luis -Author on March 1, 2021 09:03:54 AM

## 2021-05-14 NOTE — PROGRESS NOTES
Progress Note      Patient Name: Anthony Tay   Patient ID: [id number]   Sex: Male   YOB: 1990    Primary Care Provider: Melly DUONG   Referring Provider: Melly DUONG    Visit Date: January 28, 2021    Provider: RHODA Luis   Location: [facility]   Location Address: [address] [place] [address]  [id number]   Location Phone: [phone]          Chief Complaint  · Follow up for Anxiety, Depression, and HTN      History Of Present Illness  Anthony Tay is a 30 year old /White male who presents for evaluation and treatment of:      Follow up for Anxiety, Depression, and HTN  Last lab work done 2/2020  Med refills needed    Pt c/o Lt Eye pain and seeing a triangle shape when looking thru. Has not seen an eye Dr for a couple yrs.    Pt reported his mom used to help with his meds and appts, but has not done anything for the last year.  Pt has not even been able to make appts with [facility] as previously done.  His brother is helping him now.    Pt brought in his meds but only has Losartan, Levothyroxine, Amlodipine and Prazosin.  Pt wants to have med list printed.    Pt also c/o headache daily.   does not check his BP at home    Pt has not been going to [facility]. Pt reports he has appt today with [facility].       flu shot- declined         Past Medical History  Disease Name Date Onset Notes   *Other --  Tourette's    Allergic rhinitis --  --    Anxiety --  --    Anxiety --  --    Asperger's syndrome --  --    Colon Polyps 7/18/14 --    Depression --  --    GERD 01/23/2015 --    Hematuria (Microscopic) 03/25/2014 03/25/2014 large blood, > 300 protein u/a in office on repeat from outpt labs.   Hypertension --  --    Resistant hypertension 02/03/2020 --    Tourette's disorder 03/06/2014 --          Past Surgical History  Procedure Name Date Notes   Colonoscopy 7/18/14 --    Cystoscopy 4/2014 WNL   Polypectomy 7/18/14 --           Medication List  Name Date Started Instructions   amlodipine 10 mg oral tablet 01/28/2021 take 1 tablet (10 mg) by oral route once daily for 30 days   aripiprazole 15 mg oral tablet  take 1 tablet (15 mg) by oral route once daily   cetirizine 10 mg oral tablet 08/22/2019 take 1 tablet by oral route once a day (at bedtime) for 90 days   hydralazine 25 mg oral tablet 01/28/2021 TAKE 1 TABLET(25 MG) BY MOUTH TWICE DAILY WITH FOOD for 30 days   losartan 100 mg oral tablet 01/28/2021 take 1 tablet (100 mg) by oral route once daily for 30 days   metoprolol succinate 100 mg oral tablet extended release 24 hr 01/28/2021 take 1 tablet by oral route 2 times a day for 90 days   sertraline 25 mg oral tablet  take 1 tablet (25 mg) by oral route once daily   Synthroid 50 mcg oral tablet 01/28/2021 take 1 tablet (50 mcg) by oral route once daily for 30 days   trazodone 150 mg oral tablet 02/09/2016 take 1 tablet (150 mg) by oral route once daily at bedtime   triamterene-hydrochlorothiazid 37.5-25 mg oral capsule 01/28/2021 take 1 capsule by oral route once a day (in the morning) for 30 days         Allergy List  Allergen Name Date Reaction Notes   morphine --  --  --          Family Medical History  Disease Name Relative/Age Notes   Stroke Uncle/   --    Diabetes Mellitus, Type II Uncle/   --          Social History  Finding Status Start/Stop Quantity Notes   Alcohol Never --/-- --  --    Tobacco Former --/29 1/2 ppd --          Immunizations  NameDate Admin Mfg Trade Name Lot Number Route Inj VIS Given VIS Publication   Hepatitis A09/20/2018 SKB HAVRIX-ADULT [id number] IM  09/20/2018 10/25/2011   Comments: Pt tolerated   InfluenzaRefused 10/30/2019 NE Not Entered  NE NE     Comments: Pt declined         Review of Systems  · Constitutional  o Denies  o : fatigue, fever, weight gain, weight loss, chills  · Eyes  o Admits  o : eye pain, spots in vision  o Denies  o : vision loss  · Cardiovascular  o Denies  o : chest Pain,  "palpitations, edema (swelling)  · Respiratory  o Denies  o : frequent cough, shortness of breath  · Gastrointestinal  o Denies  o : nausea, vomiting, changes in bowel habits  · Genitourinary  o Denies  o : dysuria, urinary frequency, urinary urgency, polyuria  · Neurologic  o Admits  o : headache  o Denies  o : tingling or numbness, dizziness  · Psychiatric  o Admits  o : anxiety, depression, difficulty sleeping  o Denies  o : mood changes, memory changes, SI/HI      Vitals  Date Time BP Position Site L\R Cuff Size HR RR TEMP (F) WT  HT  BMI kg/m2 BSA m2 O2 Sat FR L/min FiO2 HC       02/03/2020 09:00 /128 Sitting    96 - R 24 97.7 384lbs 4oz 6'  4\" 46.77 3.06 95 %      02/03/2020 09:06 /105 Sitting    122 - R             01/28/2021 07:52 /131 Sitting    127 - R 24 98.2 394lbs 5oz 6'  4\" 48 3.1 94 %      01/28/2021 07:56 /127 Sitting    126 - R             01/28/2021 08:44 /124 Sitting                       Physical Examination  · Constitutional  o Appearance  o : well-nourished, well developed, large body habitus   · Eyes  o Conjunctivae  o : conjunctivae normal  o Sclerae  o : sclerae white  o Pupils and Irises  o : pupils equal and round  o Eyelids/Ocular Adnexae  o : eyelid appearance normal, no exudates present  · Neck  o Inspection/Palpation  o : normal appearance, no masses or tenderness, trachea midline  o Range of Motion  o : cervical range of motion within normal limits  o Thyroid  o : gland size normal, nontender, no nodules or masses present on palpation  · Respiratory  o Respiratory Effort  o : breathing unlabored  o Inspection of Chest  o : normal appearance  o Auscultation of Lungs  o : normal breath sounds throughout inspiration and expiration  · Cardiovascular  o Heart  o :   § Auscultation of Heart  § : regular rate and rhythm, no murmurs, gallops or rubs  · Gastrointestinal  o Abdominal Examination  o : abdomen nontender to palpation, tone normal without rigidity or " guarding, no masses present, bowel sounds present  · Skin and Subcutaneous Tissue  o General Inspection  o : no rashes or lesions present, no areas of discoloration  o Body Hair  o : hair normal for age, general body hair distribution normal for age  o Digits and Nails  o : no clubbing, cyanosis, deformities or edema present, normal appearing nails  · Neurologic  o Mental Status Examination  o :   § Orientation  § : grossly oriented to person, place and time  o Gait and Station  o : normal gait, able to stand without difficulty  · Psychiatric  o Judgement and Insight  o : judgment and insight intact  o Mood and Affect  o : mood normal, affect appropriate          Assessment  · Anxiety disorder     300.00/F41.9  · Depression     311/F32.9  · Essential hypertension     401.9/I10  · Hypothyroidism     244.9/E03.9  · Class 3 severe obesity due to excess calories with serious comorbidity and body mass index (BMI) of 45.0 to 49.9 in adult       Morbid (severe) obesity due to excess calories     278.01/E66.01  Body mass index [BMI] 45.0-49.9, adult     278.01/Z68.42  · Uncontrolled hypertension     401.9/I10  · Vision changes     368.9/H53.9      Plan  · Orders  o Physical, Primary Care Panel (CBC, CMP, Lipid, TSH) [facility] (77462, 82148, 41557, [address]4613) - - 01/28/2021  o ACO-14: Influenza immunization was not administered for reasons documented [facility] () - - 01/28/2021  o ACO-39: Current medications updated and reviewed (1159F, ) - - 01/28/2021  o OPHTHALMOLOGY CONSULTATION (OPHTH) - 401.9/I10, 368.9/H53.9 - 01/28/2021  o Clonidine 0.1mg Tablet [facility] () - 401.9/I10 - 01/28/2021   Administered via oral route in office at 8:05am. Mfr: [employer], Lot#[id number]/Exp:03/2023. NDC: 3372-5822-97. pt tolerated well.  · Medications  o amlodipine 10 mg oral tablet   SIG: take 1 tablet (10 mg) by oral route once daily for 30 days   DISP: (30) Tablet with 0 refills  Refilled on 01/28/2021     o hydralazine 25 mg oral  tablet   SIG: TAKE 1 TABLET(25 MG) BY MOUTH TWICE DAILY WITH FOOD for 30 days   DISP: (60) Tablet with 1 refills  Refilled on 01/28/2021     o losartan 100 mg oral tablet   SIG: take 1 tablet (100 mg) by oral route once daily for 30 days   DISP: (30) Tablet with 0 refills  Refilled on 01/28/2021     o metoprolol succinate 100 mg oral tablet extended release 24 hr   SIG: take 1 tablet by oral route 2 times a day for 90 days   DISP: (180) Tablet with 1 refills  Refilled on 01/28/2021     o Synthroid 50 mcg oral tablet   SIG: take 1 tablet (50 mcg) by oral route once daily for 30 days   DISP: (30) Tablet with 0 refills  Refilled on 01/28/2021     o triamterene-hydrochlorothiazid 37.5-25 mg oral capsule   SIG: take 1 capsule by oral route once a day (in the morning) for 30 days   DISP: (30) Capsule with 1 refills  Refilled on 01/28/2021     o Medications have been Reconciled  o Transition of Care or Provider Policy  · Instructions  o Patient was given an SSRI/SSNRI medication and warned of possible side effects of the medication including potential for increased risk of suicidal thoughts and feelings. Patient was instructed that if they begin to exhibit any of these effects they will discontinue the medication immediately and contact our office or the ER ASAP.  o Patient advised to monitor blood pressure (B/P) at home and journal readings. Patient informed that a B/P reading at home of more than 130/80 is considered hypertension. For readings greater [id number]/90 or higher patient is advised to follow up in the office with readings for management. Patient advised to limit sodium intake.  o Patient was educated/instructed on their diagnosis, treatment and medications prior to discharge from the clinic today.  o Call the office with any concerns or questions.  o Go to ER for h/a, c/p or dizziness. Go to eye dr appt today for eye pain exam as scheduled at 12:45.  o Written blood pressure titration schedule given to pt as  well as routine med list of when to take medications.   · Disposition  o Return to Office in 1 month.            Electronically Signed by: RHODA Luis -Author on January 28, 2021 09:09:05 AM

## 2021-05-15 VITALS
DIASTOLIC BLOOD PRESSURE: 94 MMHG | OXYGEN SATURATION: 96 % | SYSTOLIC BLOOD PRESSURE: 148 MMHG | WEIGHT: 315 LBS | HEIGHT: 76 IN | TEMPERATURE: 98.1 F | BODY MASS INDEX: 38.36 KG/M2 | HEART RATE: 96 BPM | RESPIRATION RATE: 24 BRPM

## 2021-05-15 VITALS
BODY MASS INDEX: 38.36 KG/M2 | HEART RATE: 88 BPM | DIASTOLIC BLOOD PRESSURE: 97 MMHG | OXYGEN SATURATION: 96 % | WEIGHT: 315 LBS | HEIGHT: 76 IN | TEMPERATURE: 97.5 F | SYSTOLIC BLOOD PRESSURE: 151 MMHG | RESPIRATION RATE: 20 BRPM

## 2021-05-15 VITALS
DIASTOLIC BLOOD PRESSURE: 104 MMHG | BODY MASS INDEX: 38.36 KG/M2 | HEIGHT: 76 IN | RESPIRATION RATE: 24 BRPM | OXYGEN SATURATION: 94 % | SYSTOLIC BLOOD PRESSURE: 154 MMHG | WEIGHT: 315 LBS | HEART RATE: 81 BPM

## 2021-05-15 VITALS
OXYGEN SATURATION: 98 % | RESPIRATION RATE: 22 BRPM | DIASTOLIC BLOOD PRESSURE: 96 MMHG | WEIGHT: 315 LBS | BODY MASS INDEX: 38.36 KG/M2 | TEMPERATURE: 98.1 F | HEIGHT: 76 IN | HEART RATE: 129 BPM | SYSTOLIC BLOOD PRESSURE: 160 MMHG

## 2021-05-15 VITALS
DIASTOLIC BLOOD PRESSURE: 128 MMHG | RESPIRATION RATE: 24 BRPM | TEMPERATURE: 97.7 F | WEIGHT: 315 LBS | OXYGEN SATURATION: 95 % | BODY MASS INDEX: 38.36 KG/M2 | HEIGHT: 76 IN | SYSTOLIC BLOOD PRESSURE: 162 MMHG | HEART RATE: 96 BPM

## 2021-05-16 VITALS — DIASTOLIC BLOOD PRESSURE: 115 MMHG | SYSTOLIC BLOOD PRESSURE: 170 MMHG

## 2021-05-16 VITALS
BODY MASS INDEX: 38.36 KG/M2 | HEART RATE: 108 BPM | SYSTOLIC BLOOD PRESSURE: 159 MMHG | OXYGEN SATURATION: 96 % | WEIGHT: 315 LBS | HEIGHT: 76 IN | RESPIRATION RATE: 21 BRPM | TEMPERATURE: 97.7 F | DIASTOLIC BLOOD PRESSURE: 120 MMHG

## 2021-05-16 VITALS
HEIGHT: 76 IN | TEMPERATURE: 97.7 F | DIASTOLIC BLOOD PRESSURE: 73 MMHG | SYSTOLIC BLOOD PRESSURE: 124 MMHG | BODY MASS INDEX: 38.36 KG/M2 | OXYGEN SATURATION: 98 % | HEART RATE: 96 BPM | WEIGHT: 315 LBS | RESPIRATION RATE: 30 BRPM

## 2021-05-16 VITALS
WEIGHT: 315 LBS | TEMPERATURE: 97.7 F | HEIGHT: 76 IN | BODY MASS INDEX: 38.36 KG/M2 | RESPIRATION RATE: 23 BRPM | SYSTOLIC BLOOD PRESSURE: 152 MMHG | DIASTOLIC BLOOD PRESSURE: 100 MMHG | HEART RATE: 100 BPM | OXYGEN SATURATION: 97 %

## 2021-05-16 VITALS
WEIGHT: 315 LBS | BODY MASS INDEX: 38.36 KG/M2 | SYSTOLIC BLOOD PRESSURE: 141 MMHG | TEMPERATURE: 97.7 F | OXYGEN SATURATION: 97 % | RESPIRATION RATE: 23 BRPM | HEART RATE: 88 BPM | HEIGHT: 76 IN | DIASTOLIC BLOOD PRESSURE: 100 MMHG

## 2021-05-16 VITALS
OXYGEN SATURATION: 99 % | RESPIRATION RATE: 24 BRPM | BODY MASS INDEX: 38.36 KG/M2 | HEART RATE: 88 BPM | DIASTOLIC BLOOD PRESSURE: 88 MMHG | SYSTOLIC BLOOD PRESSURE: 148 MMHG | HEIGHT: 76 IN | TEMPERATURE: 97.6 F | WEIGHT: 315 LBS

## 2021-05-16 VITALS
OXYGEN SATURATION: 96 % | SYSTOLIC BLOOD PRESSURE: 163 MMHG | BODY MASS INDEX: 38.36 KG/M2 | WEIGHT: 315 LBS | HEART RATE: 106 BPM | TEMPERATURE: 97.7 F | DIASTOLIC BLOOD PRESSURE: 115 MMHG | RESPIRATION RATE: 20 BRPM | HEIGHT: 76 IN

## 2021-06-05 NOTE — PROCEDURES
Procedure Note      Patient Name: Anthony Tay   Patient ID: [id number]   Sex: Male   YOB: 1990    Primary Care Provider: Melly DUONG   Referring Provider: Melly DUONG    Visit Date: May 13, 2021    Provider: Wil Mendieta MD   Location: [facility]   Location Address: [address] [place] [address], [facility]   [place], [place]  [id number]   Location Phone: [phone]          FINAL REPORT   HOLTER MONITOR REPORT  Date: 05/13/2021   Indication: Palpitations      Interpretation Date: 05/21/2021    48-HOUR HOLTER MONITOR     FINDINGS:   The patient was monitored for 48 hours. The heart rate ranged from . The average was 110 beats per minute. There were no pauses. There were no episodes of atrial fibrillation. There were 104 PACs. There were 46,271 PVCs (14.5%). These were mostly isolated PVCs. There were a few couplets and some episodes of bigeminy. There were 4 patient events. Two of these correlated with sinus rhythm and two correlated with single PVCs.     CONCLUSIONS:  1.  Sinus rhythm with frequent PVCs, the vast majority were isolated single PVCs (14.5% total).   2.  Otherwise occasional ventricular bigeminy.   3.  The patient symptoms correlated occasional with PVCs.   4.  No sustained arrhythmias observed.      ELISEO Mendieta MD   CBD/pap                 Electronically Signed by: Danica Spencer-, Other -Author on May 24, 2021 09:20:38 AM  Electronically Co-signed by: Wil Mendieta MD -Reviewer on May 24, 2021 10:25:49 AM

## 2021-06-05 NOTE — PROCEDURES
"   Procedure Note      Patient Name: Anthony Tay   Patient ID: [id number]   Sex: Male   YOB: 1990    Primary Care Provider: Melly DUONG   Referring Provider: Melly DUONG    Visit Date: May 13, 2021    Provider: Wil Mendieta MD   Location: [facility]   Location Address: [address] [place] [address], [address]   [place]  [id number]   Location Phone: [phone]          FINAL REPORT   TRANSTHORACIC ECHOCARDIOGRAM REPORT    Diagnosis: Shortness of breath and Abnormal EKG   Height: 6'5\" Weight: 400 B/P: 138/102 BSA: 3   Tech: JLW   MEASUREMENTS:  RVID (Diastole) : RVID. (NORMAL: 0.7 to 2.4 cm max)   LVID (Systole): 2.9 cm (Diastole): 4.5 cm . (NORMAL: 3.7 - 5.4 cm)   Posterior Wall Thickness (Diastole): 1.2 cm. (NORMAL: 0.8 - 1.1 cm)   Septal Thickness (Diastole): 1.2 cm. (NORMAL: 0.7 - 1.2 cm)   LAID (Systole): 3.6 cm. (NORMAL: 1.9 - 3.8 cm)   Aortic Root Diameter (Diastole): 3.3 cm. (NORMAL: 2.0 - 3.7 cm)   COMMENTS:  The patient underwent 2-D, M-Mode, and Doppler examination, including pulse-wave, continuous-wave, and color-flow analysis. The study is technically difficult due to obesity.   FINDINGS:  MITRAL VALVE: Grossly normal in structure and function.   AORTIC VALVE: Normal in structure and function.   TRICUSPID VALVE: Normal in structure and function. No evidence of pulmonary hypertension.   PULMONIC VALVE: Grossly normal.   AORTIC ROOT: Normal.   LEFT ATRIUM: Normal in size.   LEFT VENTRICLE: The left ventricular is normal in size. There is mild concentric left ventricular hypertrophy. Ejection fraction is 60%. Diastolic function measurements are normal.   RIGHT VENTRICLE: Normal size and function.   RIGHT ATRIUM: Normal in size.   PERICARDIUM: Normal.   INFERIOR VENA CAVA: Normal.   DOPPLER: E/A ratio is 1.4.DT= 165 msec. IVRT is 88 msec. E/E' is 9.   Faxed: 05/15/2021      CONCLUSIONS:  1.  Technically difficult study due to obesity.   2.  Normal biventricular " systolic function, ejection fraction 60%.   3.  Normal diastolic function.   4.  Mild left ventricular hypertrophy.  5.  No evidence of valvular dysfunction.     ELISEO Mendieta MD  CBD/rt                     Electronically Signed by: Selene Ortiz-, Other -Author on May 15, 2021 12:36:58 PM  Electronically Co-signed by: Wil Mendieta MD -Reviewer on May 16, 2021 09:58:41 AM

## 2021-07-06 RX ORDER — AMLODIPINE BESYLATE 5 MG/1
TABLET ORAL
Qty: 90 TABLET | OUTPATIENT
Start: 2021-07-06

## 2021-07-06 RX ORDER — HYDRALAZINE HYDROCHLORIDE 100 MG/1
TABLET, FILM COATED ORAL
Qty: 60 TABLET | OUTPATIENT
Start: 2021-07-06

## 2021-07-06 RX ORDER — DICLOFENAC SODIUM 75 MG/1
TABLET, DELAYED RELEASE ORAL
Qty: 60 TABLET | OUTPATIENT
Start: 2021-07-06

## 2021-07-06 RX ORDER — HYDRALAZINE HYDROCHLORIDE 50 MG/1
50 TABLET, FILM COATED ORAL 2 TIMES DAILY
COMMUNITY
End: 2021-07-08 | Stop reason: SDUPTHER

## 2021-07-06 RX ORDER — AMLODIPINE BESYLATE 10 MG/1
10 TABLET ORAL DAILY
COMMUNITY
End: 2021-12-16 | Stop reason: SDUPTHER

## 2021-07-06 RX ORDER — DICLOFENAC SODIUM 75 MG/1
75 TABLET, DELAYED RELEASE ORAL 2 TIMES DAILY
COMMUNITY
End: 2021-12-16 | Stop reason: SDUPTHER

## 2021-07-08 ENCOUNTER — OFFICE VISIT (OUTPATIENT)
Dept: FAMILY MEDICINE CLINIC | Facility: CLINIC | Age: 31
End: 2021-07-08

## 2021-07-08 VITALS
BODY MASS INDEX: 50.04 KG/M2 | TEMPERATURE: 97.7 F | WEIGHT: 315 LBS | SYSTOLIC BLOOD PRESSURE: 130 MMHG | OXYGEN SATURATION: 97 % | DIASTOLIC BLOOD PRESSURE: 86 MMHG | HEART RATE: 117 BPM

## 2021-07-08 DIAGNOSIS — R60.9 PERIPHERAL EDEMA: ICD-10-CM

## 2021-07-08 DIAGNOSIS — R53.82 CHRONIC FATIGUE: Primary | ICD-10-CM

## 2021-07-08 DIAGNOSIS — E55.9 VITAMIN D DEFICIENCY: ICD-10-CM

## 2021-07-08 DIAGNOSIS — R06.83 SNORING: ICD-10-CM

## 2021-07-08 DIAGNOSIS — H60.543 ECZEMATOID OTITIS EXTERNA OF BOTH EARS, UNSPECIFIED CHRONICITY: ICD-10-CM

## 2021-07-08 DIAGNOSIS — E66.01 MORBID (SEVERE) OBESITY DUE TO EXCESS CALORIES (HCC): ICD-10-CM

## 2021-07-08 PROBLEM — F32.9 MAJOR DEPRESSIVE DISORDER: Status: ACTIVE | Noted: 2021-04-01

## 2021-07-08 PROBLEM — F84.5 ASPERGER'S SYNDROME: Status: ACTIVE | Noted: 2021-07-08

## 2021-07-08 PROBLEM — F25.9 SCHIZOAFFECTIVE DISORDER: Status: ACTIVE | Noted: 2021-04-01

## 2021-07-08 PROBLEM — J30.9 ALLERGIC RHINITIS: Status: ACTIVE | Noted: 2021-07-08

## 2021-07-08 PROBLEM — F41.9 ANXIETY: Status: ACTIVE | Noted: 2021-07-08

## 2021-07-08 PROBLEM — E03.9 HYPOTHYROIDISM: Status: ACTIVE | Noted: 2021-04-01

## 2021-07-08 PROBLEM — I10 ESSENTIAL HYPERTENSION: Status: ACTIVE | Noted: 2021-04-01

## 2021-07-08 PROCEDURE — 99214 OFFICE O/P EST MOD 30 MIN: CPT | Performed by: NURSE PRACTITIONER

## 2021-07-08 RX ORDER — HYDRALAZINE HYDROCHLORIDE 50 MG/1
TABLET, FILM COATED ORAL
Qty: 90 TABLET | Refills: 2 | Status: SHIPPED | OUTPATIENT
Start: 2021-07-08 | End: 2021-12-16 | Stop reason: SDUPTHER

## 2021-07-08 RX ORDER — SERTRALINE HYDROCHLORIDE 25 MG/1
TABLET, FILM COATED ORAL
COMMUNITY
End: 2021-07-08 | Stop reason: ALTCHOICE

## 2021-07-08 RX ORDER — ARIPIPRAZOLE 15 MG/1
TABLET ORAL
COMMUNITY
End: 2021-07-08 | Stop reason: ALTCHOICE

## 2021-07-08 RX ORDER — ERGOCALCIFEROL 1.25 MG/1
50000 CAPSULE ORAL
Qty: 12 CAPSULE | Refills: 0 | Status: SHIPPED | OUTPATIENT
Start: 2021-07-08 | End: 2021-09-30

## 2021-07-08 RX ORDER — FLECAINIDE ACETATE 50 MG/1
TABLET ORAL
COMMUNITY
Start: 2021-05-28 | End: 2021-07-08 | Stop reason: ALTCHOICE

## 2021-07-08 RX ORDER — MIRTAZAPINE 7.5 MG/1
TABLET, FILM COATED ORAL
COMMUNITY
End: 2021-07-08 | Stop reason: ALTCHOICE

## 2021-07-08 RX ORDER — CIPROFLOXACIN AND DEXAMETHASONE 3; 1 MG/ML; MG/ML
4 SUSPENSION/ DROPS AURICULAR (OTIC) 2 TIMES DAILY
Qty: 7.5 ML | Refills: 0 | Status: SHIPPED | OUTPATIENT
Start: 2021-07-08 | End: 2021-07-15

## 2021-07-08 RX ORDER — BUPROPION HYDROCHLORIDE 100 MG/1
TABLET ORAL
COMMUNITY
End: 2021-07-08 | Stop reason: ALTCHOICE

## 2021-07-08 NOTE — PROGRESS NOTES
Chief Complaint  Fluid Retention (in wrist and legs), Fatigue (extreme drowsiness, ), Nutrition Counseling (possible dietician, help with eating), and Allergies    PHQ-2 Total Score: 1    Subjective        Past Medical History:   Diagnosis Date   • Allergic rhinitis    • Anxiety    • Asperger's syndrome    • Colon polyps 07/18/2014   • Depression    • GERD (gastroesophageal reflux disease) 01/23/2015   • Hematuria, microscopic 03/25/2014    3/25//2014 large blood, > 300 protein u/a in office on repeat from outpt labs.   • Hypertension    • Microscopic hematuria 3/25/2014   • Resistant hypertension 02/03/2020   • Tourette's    • Tourette's disorder 03/06/2014     Social History     Tobacco Use   • Smoking status: Light Tobacco Smoker     Packs/day: 0.05   • Tobacco comment: Stopped smoking at age 29; social smoker   Substance Use Topics   • Alcohol use: Never   • Drug use: Not on file      Current Outpatient Medications on File Prior to Visit   Medication Sig   • amLODIPine (NORVASC) 10 MG tablet Take 10 mg by mouth Daily.   • diclofenac (VOLTAREN) 75 MG EC tablet Take 75 mg by mouth 2 (Two) Times a Day.   • [DISCONTINUED] hydrALAZINE (APRESOLINE) 50 MG tablet Take 50 mg by mouth 2 (Two) Times a Day.   • [DISCONTINUED] ARIPiprazole (ABILIFY) 15 MG tablet aripiprazole 15 mg oral tablet take 1 tablet (15 mg) by oral route once daily   Active   • [DISCONTINUED] buPROPion (WELLBUTRIN) 100 MG tablet bupropion HCl 100 mg oral tablet take 0.5 tablet by oral route daily   Suspended   • [DISCONTINUED] Cariprazine HCl (Vraylar) 1.5 MG capsule capsule Vraylar 1.5 mg oral capsule take 1 capsule (1.5 mg) by oral route once daily   Active   • [DISCONTINUED] flecainide (TAMBOCOR) 50 MG tablet flecainide 50 mg oral tablet take 1 tablet (50 mg) by oral route every 12 hours 5/28/2021  Active   • [DISCONTINUED] mirtazapine (REMERON) 7.5 MG tablet mirtazapine 7.5 mg oral tablet take 1 tablet (7.5 mg) by oral route once daily at bedtime    Active   • [DISCONTINUED] sertraline (ZOLOFT) 25 MG tablet sertraline 25 mg oral tablet take 1 tablet (25 mg) by oral route once daily   Suspended     No current facility-administered medications on file prior to visit.      Allergies   Allergen Reactions   • Morphine Other (See Comments)   • Shellfish Allergy Swelling   • Shellfish-Derived Products Nausea And Vomiting and Swelling      Health Maintenance Due   Topic Date Due   • ANNUAL PHYSICAL  Never done   • COVID-19 Vaccine (1) Never done   • TDAP/TD VACCINES (1 - Tdap) Never done   • HEPATITIS C SCREENING  Never done      Anthony Tay presents to [facility]  Fluid retention: notes to legs and hands - drinks mainly water, has cut out soda - states he has SOB - admits to snoring and apneic episodes     Fatigue: states he will fall asleep at any time - states he will wake up feeling tired - currently on sleep aid that he takes about 9pm     Nutrition counseling: pt states he can be outside and sweat for a few days but does not have any decrease in weight - trying to reduce bread intake and eats meats, vegetables, and some potatoes - trying to reduce salt and carbohydrates     Ears are itching - denies sinus congestion or drainage      Objective   Vital Signs:   /86   Pulse 117   Temp 97.7 °F (36.5 °C)   Wt (!) 191 kg (422 lb)   SpO2 97%   BMI 50.04 kg/m²     Review of Systems   Physical Exam  Vitals reviewed.   Constitutional:       General: He is not in acute distress.     Appearance: Normal appearance. He is well-developed.   HENT:      Head: Normocephalic and atraumatic.      Right Ear: External ear normal.      Left Ear: External ear normal.      Ears:      Comments: Dry scaling skin noted to bilateral canal.   Eyes:      Conjunctiva/sclera: Conjunctivae normal.      Pupils: Pupils are equal, round, and reactive to light.   Cardiovascular:      Rate and Rhythm: Normal rate and regular rhythm.      Heart  sounds: No murmur heard.     Pulmonary:      Effort: Pulmonary effort is normal.      Breath sounds: Normal breath sounds. No wheezing or rhonchi.   Musculoskeletal:      Cervical back: Neck supple.      Right lower leg: Edema (trace) present.      Left lower leg: Edema (trace) present.   Skin:     General: Skin is warm and dry.   Neurological:      Mental Status: He is alert and oriented to person, place, and time.      Cranial Nerves: No cranial nerve deficit.   Psychiatric:         Mood and Affect: Mood and affect normal.         Behavior: Behavior normal.         Thought Content: Thought content normal.         Judgment: Judgment normal.        Result Review :   The following data was reviewed by: RHODA Lunsford on 07/08/2021:  CMP    CMP 1/28/21 1/30/21 4/1/21   Glucose 138 (A) 96 92   BUN 12 9 9   Creatinine 1.20 1.10 1.08   Sodium 138 138 136   Potassium 3.8 3.9 4.4   Chloride 99 103 102   Calcium 9.8 9.0 9.3   Albumin 4.0 3.7 4.1   Total Bilirubin 0.38 0.28 0.39   Alkaline Phosphatase 112 118 106   AST (SGOT) 50 39 58 (A)   ALT (SGPT) 45 (A) 43 (A) 54 (A)   (A) Abnormal value       Comments are available for some flowsheets but are not being displayed.           CBC    CBC 1/28/21 1/30/21 4/1/21   WBC 10.81 (A) 12.35 (A) 10.75   RBC 6.00 5.71 5.89   Hemoglobin 15.9 15.1 15.6   Hematocrit 49.7 46.6 48.8   MCV 82.8 81.6 82.9   MCH 26.5 (A) 26.4 (A) 26.5 (A)   MCHC 32.0 (A) 32.4 (A) 32.0 (A)   RDW 14.0 13.8 14.4   Platelets 290 307 347   (A) Abnormal value            TSH    TSH 1/28/21 4/1/21   TSH 3.520 2.460                     Assessment and Plan    Diagnoses and all orders for this visit:    1. Chronic fatigue (Primary)  -     Ambulatory Referral to Sleep Medicine    2. Snoring  -     Ambulatory Referral to Sleep Medicine    3. Morbid (severe) obesity due to excess calories (CMS/HCC)  -     Ambulatory Referral to Nutrition Services    4. Vitamin D deficiency  -     vitamin D (ERGOCALCIFEROL) 1.25 MG  (86330 UT) capsule capsule; Take 1 capsule by mouth Every 7 (Seven) Days.  Dispense: 12 capsule; Refill: 0    5. Eczematoid otitis externa of both ears, unspecified chronicity  -     ciprofloxacin-dexamethasone (Ciprodex) 0.3-0.1 % otic suspension; Administer 4 drops into both ears 2 (Two) Times a Day for 7 days.  Dispense: 7.5 mL; Refill: 0    6. Peripheral edema  -     hydrALAZINE (APRESOLINE) 50 MG tablet; Take 2 tablets in the AM and 1 tablet in the PM  Dispense: 90 tablet; Refill: 2    Increase Hydralazine for PE - follow up in 1 month.     Follow Up   Return in about 4 weeks (around 8/5/2021) for Recheck.  Patient was given instructions and counseling regarding his condition or for health maintenance advice. Please see specific information pulled into the AVS if appropriate.

## 2021-07-19 ENCOUNTER — HOSPITAL ENCOUNTER (EMERGENCY)
Facility: HOSPITAL | Age: 31
Discharge: HOME | End: 2021-07-19
Attending: EMERGENCY MEDICINE | Admitting: EMERGENCY MEDICINE

## 2021-07-19 ENCOUNTER — APPOINTMENT (OUTPATIENT)
Dept: GENERAL RADIOLOGY | Facility: HOSPITAL | Age: 31
End: 2021-07-19

## 2021-07-19 VITALS
BODY MASS INDEX: 37.19 KG/M2 | WEIGHT: 315 LBS | DIASTOLIC BLOOD PRESSURE: 101 MMHG | TEMPERATURE: 97.8 F | HEIGHT: 77 IN | OXYGEN SATURATION: 96 % | SYSTOLIC BLOOD PRESSURE: 126 MMHG | RESPIRATION RATE: 22 BRPM | HEART RATE: 112 BPM

## 2021-07-19 DIAGNOSIS — F41.0 ANXIETY ATTACK: Primary | ICD-10-CM

## 2021-07-19 DIAGNOSIS — I10 ESSENTIAL HYPERTENSION: ICD-10-CM

## 2021-07-19 DIAGNOSIS — R60.0 PEDAL EDEMA: ICD-10-CM

## 2021-07-19 LAB
ALBUMIN SERPL-MCNC: 4 G/DL (ref 3.5–5.2)
ALBUMIN/GLOB SERPL: 1 G/DL
ALP SERPL-CCNC: 111 U/L (ref 39–117)
ALT SERPL W P-5'-P-CCNC: 43 U/L (ref 1–41)
ANION GAP SERPL CALCULATED.3IONS-SCNC: 11.5 MMOL/L (ref 5–15)
AST SERPL-CCNC: 43 U/L (ref 1–40)
BASOPHILS # BLD AUTO: 0.08 10*3/MM3 (ref 0–0.2)
BASOPHILS NFR BLD AUTO: 0.5 % (ref 0–1.5)
BILIRUB SERPL-MCNC: 0.5 MG/DL (ref 0–1.2)
BUN SERPL-MCNC: 10 MG/DL (ref 6–20)
BUN/CREAT SERPL: 7.6 (ref 7–25)
CALCIUM SPEC-SCNC: 9 MG/DL (ref 8.6–10.5)
CHLORIDE SERPL-SCNC: 100 MMOL/L (ref 98–107)
CO2 SERPL-SCNC: 24.5 MMOL/L (ref 22–29)
CREAT SERPL-MCNC: 1.32 MG/DL (ref 0.76–1.27)
DEPRECATED RDW RBC AUTO: 40 FL (ref 37–54)
EOSINOPHIL # BLD AUTO: 0.14 10*3/MM3 (ref 0–0.4)
EOSINOPHIL NFR BLD AUTO: 0.9 % (ref 0.3–6.2)
ERYTHROCYTE [DISTWIDTH] IN BLOOD BY AUTOMATED COUNT: 13.8 % (ref 12.3–15.4)
GFR SERPL CREATININE-BSD FRML MDRD: 63 ML/MIN/1.73
GLOBULIN UR ELPH-MCNC: 4.1 GM/DL
GLUCOSE SERPL-MCNC: 106 MG/DL (ref 65–99)
HCT VFR BLD AUTO: 46.1 % (ref 37.5–51)
HGB BLD-MCNC: 15.2 G/DL (ref 13–17.7)
HOLD SPECIMEN: NORMAL
HOLD SPECIMEN: NORMAL
IMM GRANULOCYTES # BLD AUTO: 0.09 10*3/MM3 (ref 0–0.05)
IMM GRANULOCYTES NFR BLD AUTO: 0.6 % (ref 0–0.5)
LYMPHOCYTES # BLD AUTO: 1.76 10*3/MM3 (ref 0.7–3.1)
LYMPHOCYTES NFR BLD AUTO: 10.9 % (ref 19.6–45.3)
MCH RBC QN AUTO: 26.6 PG (ref 26.6–33)
MCHC RBC AUTO-ENTMCNC: 33 G/DL (ref 31.5–35.7)
MCV RBC AUTO: 80.6 FL (ref 79–97)
MONOCYTES # BLD AUTO: 0.88 10*3/MM3 (ref 0.1–0.9)
MONOCYTES NFR BLD AUTO: 5.5 % (ref 5–12)
NEUTROPHILS NFR BLD AUTO: 13.19 10*3/MM3 (ref 1.7–7)
NEUTROPHILS NFR BLD AUTO: 81.6 % (ref 42.7–76)
NRBC BLD AUTO-RTO: 0 /100 WBC (ref 0–0.2)
NT-PROBNP SERPL-MCNC: 271.3 PG/ML (ref 0–450)
PLATELET # BLD AUTO: 313 10*3/MM3 (ref 140–450)
PMV BLD AUTO: 9.8 FL (ref 6–12)
POTASSIUM SERPL-SCNC: 4.1 MMOL/L (ref 3.5–5.2)
PROT SERPL-MCNC: 8.1 G/DL (ref 6–8.5)
QT INTERVAL: 319 MS
RBC # BLD AUTO: 5.72 10*6/MM3 (ref 4.14–5.8)
SODIUM SERPL-SCNC: 136 MMOL/L (ref 136–145)
TROPONIN T SERPL-MCNC: <0.01 NG/ML (ref 0–0.03)
WBC # BLD AUTO: 16.14 10*3/MM3 (ref 3.4–10.8)
WHOLE BLOOD HOLD SPECIMEN: NORMAL

## 2021-07-19 PROCEDURE — 25010000002 FUROSEMIDE PER 20 MG: Performed by: EMERGENCY MEDICINE

## 2021-07-19 PROCEDURE — 25010000002 LORAZEPAM PER 2 MG: Performed by: EMERGENCY MEDICINE

## 2021-07-19 PROCEDURE — 83880 ASSAY OF NATRIURETIC PEPTIDE: CPT

## 2021-07-19 PROCEDURE — 96375 TX/PRO/DX INJ NEW DRUG ADDON: CPT

## 2021-07-19 PROCEDURE — 96374 THER/PROPH/DIAG INJ IV PUSH: CPT

## 2021-07-19 PROCEDURE — 71045 X-RAY EXAM CHEST 1 VIEW: CPT

## 2021-07-19 PROCEDURE — 80053 COMPREHEN METABOLIC PANEL: CPT

## 2021-07-19 PROCEDURE — 84484 ASSAY OF TROPONIN QUANT: CPT

## 2021-07-19 PROCEDURE — 99284 EMERGENCY DEPT VISIT MOD MDM: CPT

## 2021-07-19 PROCEDURE — 93005 ELECTROCARDIOGRAM TRACING: CPT

## 2021-07-19 PROCEDURE — 36415 COLL VENOUS BLD VENIPUNCTURE: CPT

## 2021-07-19 PROCEDURE — 85025 COMPLETE CBC W/AUTO DIFF WBC: CPT

## 2021-07-19 PROCEDURE — 93010 ELECTROCARDIOGRAM REPORT: CPT | Performed by: INTERNAL MEDICINE

## 2021-07-19 RX ORDER — LORAZEPAM 2 MG/ML
1 INJECTION INTRAMUSCULAR ONCE
Status: COMPLETED | OUTPATIENT
Start: 2021-07-19 | End: 2021-07-19

## 2021-07-19 RX ORDER — LABETALOL HYDROCHLORIDE 5 MG/ML
10 INJECTION, SOLUTION INTRAVENOUS ONCE
Status: COMPLETED | OUTPATIENT
Start: 2021-07-19 | End: 2021-07-19

## 2021-07-19 RX ORDER — SODIUM CHLORIDE 0.9 % (FLUSH) 0.9 %
10 SYRINGE (ML) INJECTION AS NEEDED
Status: DISCONTINUED | OUTPATIENT
Start: 2021-07-19 | End: 2021-07-19 | Stop reason: HOSPADM

## 2021-07-19 RX ORDER — FUROSEMIDE 10 MG/ML
20 INJECTION INTRAMUSCULAR; INTRAVENOUS ONCE
Status: COMPLETED | OUTPATIENT
Start: 2021-07-19 | End: 2021-07-19

## 2021-07-19 RX ADMIN — FUROSEMIDE 20 MG: 10 INJECTION, SOLUTION INTRAMUSCULAR; INTRAVENOUS at 16:21

## 2021-07-19 RX ADMIN — LABETALOL 20 MG/4 ML (5 MG/ML) INTRAVENOUS SYRINGE 10 MG: at 16:23

## 2021-07-19 RX ADMIN — LORAZEPAM 1 MG: 2 INJECTION INTRAMUSCULAR; INTRAVENOUS at 16:17

## 2021-07-19 RX ADMIN — SODIUM CHLORIDE, PRESERVATIVE FREE 10 ML: 5 INJECTION INTRAVENOUS at 16:25

## 2021-07-19 RX ADMIN — SODIUM CHLORIDE, PRESERVATIVE FREE 10 ML: 5 INJECTION INTRAVENOUS at 16:19

## 2021-07-19 RX ADMIN — SODIUM CHLORIDE, PRESERVATIVE FREE 10 ML: 5 INJECTION INTRAVENOUS at 16:22

## 2021-07-19 NOTE — DISCHARGE INSTRUCTIONS
No signs of a heart attack or anything life-threatening were found, but your blood pressure here was too high and you have some swelling in your legs.    Keep taking your current blood pressure meds and also start taking the low-dose water pill called Lasix once daily this week and stay on a low salt and low sodium diet.    Call the cardiology clinic for a follow-up appointment to see Dr. Cesar in the next week or so for further management of your high blood pressure and leg swelling.

## 2021-07-19 NOTE — ED PROVIDER NOTES
Time: 2:40 PM EDT  Arrived by: EMS  Chief Complaint: private vehicle  History provided by: patient and family  History is limited by: N/A    History of Present Illness:  Patient is a 31 y.o. year old male that presents to the emergency department with shortness of breath and left arm pain. Pt family reports that pt was outside and thought it maybe related to being out in the heat. Pt notes he was hyperventilating.     Pt has a history of A-fib.       Arm Pain  Location:  Left  Severity:  Mild  Onset quality:  Sudden  Duration:  2 hours  Timing:  Constant  Progression:  Unable to specify  Chronicity:  New  Relieved by:  Nothing  Worsened by:  Nothing  Ineffective treatments:  None tried  Associated symptoms: shortness of breath    Associated symptoms: no chest pain, no cough, no diarrhea, no fever, no rash and no vomiting    Shortness of breath:     Severity:  Mild    Onset quality:  Sudden    Duration:  2 hours    Timing:  Constant    Progression:  Unable to specify          Similar Symptoms Previously: no  Recently seen: yes      Patient Care Team  Primary Care Provider: Hillary Worthington APRN      Past Medical History:     Allergies   Allergen Reactions   • Morphine Other (See Comments)   • Shellfish Allergy Swelling   • Shellfish-Derived Products Nausea And Vomiting and Swelling     Past Medical History:   Diagnosis Date   • Allergic rhinitis    • Anxiety    • Asperger's syndrome    • Colon polyps 07/18/2014   • Depression    • GERD (gastroesophageal reflux disease) 01/23/2015   • Hematuria, microscopic 03/25/2014    3/25//2014 large blood, > 300 protein u/a in office on repeat from outpt labs.   • Hypertension    • Microscopic hematuria 3/25/2014   • Resistant hypertension 02/03/2020   • Tourette's    • Tourette's disorder 03/06/2014     Past Surgical History:   Procedure Laterality Date   • COLONOSCOPY  07/18/2014   • CYSTOSCOPY  04/2014    WNL   • POLYPECTOMY  07/18/2014     Family History   Problem Relation  "Age of Onset   • Stroke Other    • Diabetes type II Other        Home Medications:  Prior to Admission medications    Medication Sig Start Date End Date Taking? Authorizing Provider   amLODIPine (NORVASC) 10 MG tablet Take 10 mg by mouth Daily.    Provider, MD Yobany   diclofenac (VOLTAREN) 75 MG EC tablet Take 75 mg by mouth 2 (Two) Times a Day.    Provider, Yobany, MD   hydrALAZINE (APRESOLINE) 50 MG tablet Take 2 tablets in the AM and 1 tablet in the PM 7/8/21   Hillary Worthington APRN   vitamin D (ERGOCALCIFEROL) 1.25 MG (32343 UT) capsule capsule Take 1 capsule by mouth Every 7 (Seven) Days. 7/8/21   Hillary Worthington APRN        Social History:   PT  reports that he has been smoking. He has been smoking about 0.05 packs per day. He does not have any smokeless tobacco history on file. He reports previous drug use. He reports that he does not drink alcohol.    Record Review:  I have reviewed the patient's records in [facility].     Review of Systems  Review of Systems   Constitutional: Negative for chills and fever.   HENT: Negative for nosebleeds.    Eyes: Negative for redness.   Respiratory: Positive for shortness of breath. Negative for cough.    Cardiovascular: Negative for chest pain.   Gastrointestinal: Negative for diarrhea and vomiting.   Genitourinary: Negative for dysuria and frequency.   Musculoskeletal: Negative for back pain and neck pain.   Skin: Negative for rash.   Neurological: Negative for seizures.        Physical Exam  BP (!) 126/101   Pulse 112   Temp 97.8 °F (36.6 °C)   Resp 22   Ht 195.6 cm (77\")   Wt (!) 192 kg (422 lb 13.5 oz)   SpO2 96%   BMI 50.14 kg/m²     Physical Exam  Vitals and nursing note reviewed.   Constitutional:       General: He is awake. He is not in acute distress.     Appearance: He is obese.   HENT:      Head: Normocephalic and atraumatic.      Nose: Nose normal.      Mouth/Throat:      Mouth: Mucous membranes are moist.   Eyes:      General: No scleral " "icterus.     Pupils: Pupils are equal, round, and reactive to light.   Cardiovascular:      Rate and Rhythm: Regular rhythm. Tachycardia present.      Pulses: Normal pulses.      Heart sounds: Normal heart sounds. No murmur heard.     Pulmonary:      Effort: Pulmonary effort is normal. No respiratory distress.      Breath sounds: Normal breath sounds and air entry. No decreased air movement. No decreased breath sounds, wheezing, rhonchi or rales.   Chest:      Chest wall: No tenderness.   Abdominal:      Palpations: Abdomen is soft.      Tenderness: There is no abdominal tenderness. There is no guarding or rebound.      Hernia: No hernia is present.   Musculoskeletal:         General: No tenderness. Normal range of motion.      Cervical back: Normal range of motion and neck supple. No tenderness.      Right lower le+ Pitting Edema (lower leg) present.      Left lower le+ Pitting Edema (lower leg) present.   Skin:     General: Skin is warm and dry.      Findings: No rash.   Neurological:      Mental Status: He is alert and oriented to person, place, and time. Mental status is at baseline.      Sensory: Sensation is intact. No sensory deficit.      Motor: Motor function is intact. No weakness.   Psychiatric:         Behavior: Behavior normal.                  ED Course  BP (!) 126/101   Pulse 112   Temp 97.8 °F (36.6 °C)   Resp 22   Ht 195.6 cm (77\")   Wt (!) 192 kg (422 lb 13.5 oz)   SpO2 96%   BMI 50.14 kg/m²   Results for orders placed or performed during the hospital encounter of 21   Comprehensive Metabolic Panel    Specimen: Blood   Result Value Ref Range    Glucose 106 (H) 65 - 99 mg/dL    BUN 10 6 - 20 mg/dL    Creatinine 1.32 (H) 0.76 - 1.27 mg/dL    Sodium 136 136 - 145 mmol/L    Potassium 4.1 3.5 - 5.2 mmol/L    Chloride 100 98 - 107 mmol/L    CO2 24.5 22.0 - 29.0 mmol/L    Calcium 9.0 8.6 - 10.5 mg/dL    Total Protein 8.1 6.0 - 8.5 g/dL    Albumin 4.00 3.50 - 5.20 g/dL    ALT (SGPT) 43 " (H) 1 - 41 U/L    AST (SGOT) 43 (H) 1 - 40 U/L    Alkaline Phosphatase 111 39 - 117 U/L    Total Bilirubin 0.5 0.0 - 1.2 mg/dL    eGFR Non African Amer 63 >60 mL/min/1.73    Globulin 4.1 gm/dL    A/G Ratio 1.0 g/dL    BUN/Creatinine Ratio 7.6 7.0 - 25.0    Anion Gap 11.5 5.0 - 15.0 mmol/L   BNP    Specimen: Blood   Result Value Ref Range    proBNP 271.3 0.0 - 450.0 pg/mL   Troponin    Specimen: Blood   Result Value Ref Range    Troponin T <0.010 0.000 - 0.030 ng/mL   CBC Auto Differential    Specimen: Blood   Result Value Ref Range    WBC 16.14 (H) 3.40 - 10.80 10*3/mm3    RBC 5.72 4.14 - 5.80 10*6/mm3    Hemoglobin 15.2 13.0 - 17.7 g/dL    Hematocrit 46.1 37.5 - 51.0 %    MCV 80.6 79.0 - 97.0 fL    MCH 26.6 26.6 - 33.0 pg    MCHC 33.0 31.5 - 35.7 g/dL    RDW 13.8 12.3 - 15.4 %    RDW-SD 40.0 37.0 - 54.0 fl    MPV 9.8 6.0 - 12.0 fL    Platelets 313 140 - 450 10*3/mm3    Neutrophil % 81.6 (H) 42.7 - 76.0 %    Lymphocyte % 10.9 (L) 19.6 - 45.3 %    Monocyte % 5.5 5.0 - 12.0 %    Eosinophil % 0.9 0.3 - 6.2 %    Basophil % 0.5 0.0 - 1.5 %    Immature Grans % 0.6 (H) 0.0 - 0.5 %    Neutrophils, Absolute 13.19 (H) 1.70 - 7.00 10*3/mm3    Lymphocytes, Absolute 1.76 0.70 - 3.10 10*3/mm3    Monocytes, Absolute 0.88 0.10 - 0.90 10*3/mm3    Eosinophils, Absolute 0.14 0.00 - 0.40 10*3/mm3    Basophils, Absolute 0.08 0.00 - 0.20 10*3/mm3    Immature Grans, Absolute 0.09 (H) 0.00 - 0.05 10*3/mm3    nRBC 0.0 0.0 - 0.2 /100 WBC   ECG 12 Lead   Result Value Ref Range    QT Interval 319 ms   Green Top (Gel)   Result Value Ref Range    Extra Tube Hold for add-ons.    Lavender Top   Result Value Ref Range    Extra Tube hold for add-on    Gold Top - SST   Result Value Ref Range    Extra Tube Hold for add-ons.      Medications   furosemide (LASIX) injection 20 mg (20 mg Intravenous Given 7/19/21 1621)   LORazepam (ATIVAN) injection 1 mg (1 mg Intravenous Given 7/19/21 1617)   labetalol (NORMODYNE,TRANDATE) injection 10 mg (10 mg  Intravenous Given 7/19/21 1623)     XR Chest 1 View    Result Date: 7/19/2021  Narrative: PROCEDURE: XR CHEST 1 VW  COMPARISON: None  INDICATIONS: SHORTNESS OF BREATH/COUGH  FINDINGS:   The lungs are well-expanded. The heart and pulmonary vasculature are within normal limits. No pleural effusions are identified. There are no active appearing infiltrates.  No evidence of pneumothorax.  IMPRESSION: No active disease.  IRLANDA BROWER MD       Electronically Signed and Approved By: IRLANDA BROWER MD on 7/19/2021 at 15:01               Procedures/EKGs:  Procedures    EKG:    Rhythm: sinus tachycardia  Rate: 120  Axis: q waves present V1, V2, V3  Intervals: normal  ST Segment: no ST elevation  No acute ischemia  EKG Comparison: unchanged from previous on January 2021    Interpreted by me        Medical Decision Making:                     MDM     Differential diagnosis for this patient with dyspnea includes asthma or COPD exacerbation, CHF exacerbation, bronchitis or pneumonia, pneumothorax, pleural effusion or pulmonary edema, or less likely MI or PE.        This patient is a pleasant 31-year-old male with history of hypertension and morbid obesity who presents after he became dizzy today and started having some sharp pain in his left arm, and then started hyperventilating and essentially having a panic attack.    All of his lab work including heart enzymes and proBNP and his chest x-ray came back normal today.    He does have significantly elevated blood pressure, which she states is chronic, and I will give him a dose of labetalol here and also a bit of Ativan for his anxiety attack.    On reassessment he looks improved and I will start him on some low-dose Lasix as he has some pitting extremity edema, and also recommend a low-sodium diet.    I will refer him to follow-up with cardiologist given his difficult to treat hypertension in the setting of being on multiple blood pressure meds and still poorly  controlled.    Patient currently is feeling better and back to baseline and denies any shortness of breath or chest pain currently.    Final diagnoses:   Anxiety attack   Essential hypertension   Pedal edema        Disposition:  ED Disposition     ED Disposition Condition Comment    Discharge Stable           Documentation assistance provided by Dixie Barnett acting as scribe for Roosevelt Hoang MD. Information recorded by the scribe was done at my direction and has been verified and validated by me.        Dixie Barnett  07/19/21 [id number]       Roosevelt Hoang MD  07/19/21 [id number]

## 2021-07-27 RX ORDER — FLECAINIDE ACETATE 50 MG/1
TABLET ORAL
Qty: 60 TABLET | Refills: 7 | Status: SHIPPED | OUTPATIENT
Start: 2021-07-27

## 2021-09-07 RX ORDER — AMLODIPINE BESYLATE 10 MG/1
TABLET ORAL
Qty: 90 TABLET | OUTPATIENT
Start: 2021-09-07

## 2021-09-28 DIAGNOSIS — E55.9 VITAMIN D DEFICIENCY: ICD-10-CM

## 2021-09-30 RX ORDER — ERGOCALCIFEROL 1.25 MG/1
CAPSULE ORAL
Qty: 12 CAPSULE | Refills: 0 | Status: SHIPPED | OUTPATIENT
Start: 2021-09-30

## 2021-10-28 DIAGNOSIS — R60.9 PERIPHERAL EDEMA: ICD-10-CM

## 2021-11-01 RX ORDER — HYDRALAZINE HYDROCHLORIDE 50 MG/1
TABLET, FILM COATED ORAL
Qty: 90 TABLET | Refills: 2 | OUTPATIENT
Start: 2021-11-01

## 2021-12-16 ENCOUNTER — OFFICE VISIT (OUTPATIENT)
Dept: FAMILY MEDICINE CLINIC | Facility: CLINIC | Age: 31
End: 2021-12-16

## 2021-12-16 VITALS
HEIGHT: 76 IN | OXYGEN SATURATION: 96 % | WEIGHT: 315 LBS | DIASTOLIC BLOOD PRESSURE: 78 MMHG | BODY MASS INDEX: 38.36 KG/M2 | TEMPERATURE: 97.3 F | SYSTOLIC BLOOD PRESSURE: 130 MMHG | HEART RATE: 120 BPM

## 2021-12-16 DIAGNOSIS — R06.83 SNORING: ICD-10-CM

## 2021-12-16 DIAGNOSIS — E55.9 VITAMIN D DEFICIENCY: ICD-10-CM

## 2021-12-16 DIAGNOSIS — R53.83 FATIGUE, UNSPECIFIED TYPE: ICD-10-CM

## 2021-12-16 DIAGNOSIS — R06.02 SHORTNESS OF BREATH: ICD-10-CM

## 2021-12-16 DIAGNOSIS — I10 ESSENTIAL HYPERTENSION: Primary | ICD-10-CM

## 2021-12-16 DIAGNOSIS — E66.01 MORBID (SEVERE) OBESITY DUE TO EXCESS CALORIES (HCC): ICD-10-CM

## 2021-12-16 DIAGNOSIS — R53.82 CHRONIC FATIGUE: ICD-10-CM

## 2021-12-16 DIAGNOSIS — E03.9 HYPOTHYROIDISM, UNSPECIFIED TYPE: ICD-10-CM

## 2021-12-16 DIAGNOSIS — R60.9 PERIPHERAL EDEMA: ICD-10-CM

## 2021-12-16 PROCEDURE — 99214 OFFICE O/P EST MOD 30 MIN: CPT | Performed by: NURSE PRACTITIONER

## 2021-12-16 RX ORDER — LOSARTAN POTASSIUM 100 MG/1
100 TABLET ORAL DAILY
Qty: 90 TABLET | Refills: 1 | Status: SHIPPED | OUTPATIENT
Start: 2021-12-16 | End: 2022-10-25 | Stop reason: SDUPTHER

## 2021-12-16 RX ORDER — LEVOTHYROXINE SODIUM 0.05 MG/1
50 TABLET ORAL DAILY
Qty: 90 TABLET | Refills: 1 | Status: SHIPPED | OUTPATIENT
Start: 2021-12-16 | End: 2022-10-25 | Stop reason: SDUPTHER

## 2021-12-16 RX ORDER — HYDRALAZINE HYDROCHLORIDE 50 MG/1
TABLET, FILM COATED ORAL
Qty: 90 TABLET | Refills: 2 | Status: SHIPPED | OUTPATIENT
Start: 2021-12-16 | End: 2022-03-16

## 2021-12-16 RX ORDER — AMLODIPINE BESYLATE 10 MG/1
10 TABLET ORAL DAILY
Qty: 90 TABLET | Refills: 1 | Status: SHIPPED | OUTPATIENT
Start: 2021-12-16 | End: 2022-10-25 | Stop reason: SDUPTHER

## 2021-12-16 RX ORDER — DICLOFENAC SODIUM 75 MG/1
75 TABLET, DELAYED RELEASE ORAL 2 TIMES DAILY
Qty: 180 TABLET | Refills: 1 | Status: SHIPPED | OUTPATIENT
Start: 2021-12-16 | End: 2022-10-25 | Stop reason: SDUPTHER

## 2021-12-16 RX ORDER — MIRTAZAPINE 45 MG/1
1 TABLET, ORALLY DISINTEGRATING ORAL
COMMUNITY
Start: 2021-11-24

## 2021-12-16 RX ORDER — CARIPRAZINE 3 MG/1
3 CAPSULE, GELATIN COATED ORAL EVERY MORNING
COMMUNITY
Start: 2021-11-26 | End: 2022-10-25 | Stop reason: DRUGHIGH

## 2021-12-16 NOTE — PROGRESS NOTES
Chief Complaint  Hypertension (refills) and Hypothyroidism    Subjective        Past Medical History:   Diagnosis Date   • Allergic rhinitis    • Anxiety    • Asperger's syndrome    • Colon polyps 07/18/2014   • Depression    • GERD (gastroesophageal reflux disease) 01/23/2015   • Hematuria, microscopic 03/25/2014    3/25//2014 large blood, > 300 protein u/a in office on repeat from outpt labs.   • Hypertension    • Microscopic hematuria 3/25/2014   • Resistant hypertension 02/03/2020   • Tourette's    • Tourette's disorder 03/06/2014     Social History     Tobacco Use   • Smoking status: Current Some Day Smoker     Packs/day: 0.05   • Smokeless tobacco: Never Used   • Tobacco comment: Stopped smoking at age 29; social smoker   Vaping Use   • Vaping Use: Never used   Substance Use Topics   • Alcohol use: Never   • Drug use: Not Currently      Current Outpatient Medications on File Prior to Visit   Medication Sig   • flecainide (TAMBOCOR) 50 MG tablet TAKE 1 TABLET(50 MG) BY MOUTH EVERY 12 HOURS   • mirtazapine (REMERON SOL-TAB) 45 MG disintegrating tablet Place 1 tablet on the tongue every night at bedtime.   • vitamin D (ERGOCALCIFEROL) 1.25 MG (48387 UT) capsule capsule TAKE 1 CAPSULE BY MOUTH EVERY 7 DAYS   • Vraylar 3 MG capsule capsule Take 3 mg by mouth Every Morning.   • [DISCONTINUED] amLODIPine (NORVASC) 10 MG tablet Take 10 mg by mouth Daily.   • [DISCONTINUED] diclofenac (VOLTAREN) 75 MG EC tablet Take 75 mg by mouth 2 (Two) Times a Day.   • [DISCONTINUED] hydrALAZINE (APRESOLINE) 50 MG tablet Take 2 tablets in the AM and 1 tablet in the PM   • [DISCONTINUED] levothyroxine (SYNTHROID, LEVOTHROID) 50 MCG tablet Take 50 mcg by mouth Daily.   • [DISCONTINUED] losartan (COZAAR) 100 MG tablet Take 100 mg by mouth Daily.   • [DISCONTINUED] mirtazapine (REMERON) 15 MG tablet Take 15 mg by mouth every night at bedtime.     No current facility-administered medications on file prior to visit.      Allergies  Refill Request for:  OMEPRAZOLE DR 40MG CAPSULE  **REQUESTING 90-DAY SUPPLY**    Last Office Visit:  03/02/2020  Upcoming Office Visit:  None  Labs Last Completed: 02/29/2020    Medication Last Filled:  03/02/2020      Pharmacy Phone #:  [phone]  Pharmacy Fax #:  [phone]     "  Allergen Reactions   • Shellfish Allergy Swelling   • Morphine Other (See Comments)     unk      Health Maintenance Due   Topic Date Due   • ANNUAL PHYSICAL  Never done   • COVID-19 Vaccine (1) Never done   • Pneumococcal Vaccine 0-64 (1 of 2 - PPSV23) Never done   • TDAP/TD VACCINES (1 - Tdap) Never done   • HEPATITIS C SCREENING  Never done   • INFLUENZA VACCINE  Never done      Anthony Tay presents to [facility]  Chronic conditions    HTN: stable in office.     He is established is [facility] but states they are not consistent with calling. Mood is stable per pt and brother. Improved since living with mother.     SOB: worse with going outside in the cold air. He has seen Cardiology and dxd with irregularity. Sleeps from about 9a-9p but will wake throughout the night. Pts brother states sleep medicine called and stated he did not need a sleep study. Pt states he does snore. Unknown apnea episodes. Pt states he will fall asleep throughout the day.       Objective   Vital Signs:   /78   Pulse 120   Temp 97.3 °F (36.3 °C)   Ht 193 cm (76\")   Wt (!) 196 kg (431 lb)   SpO2 96%   BMI 52.46 kg/m²     Review of Systems   Constitutional: Positive for fatigue.   Respiratory: Positive for shortness of breath.    Cardiovascular: Positive for palpitations (rapid heart with activity). Negative for chest pain.      Physical Exam  Vitals reviewed.   Constitutional:       General: He is not in acute distress.     Appearance: Normal appearance. He is well-developed. He is obese.   HENT:      Head: Normocephalic and atraumatic.      Right Ear: External ear normal.      Left Ear: External ear normal.   Eyes:      Conjunctiva/sclera: Conjunctivae normal.      Pupils: Pupils are equal, round, and reactive to light.   Cardiovascular:      Rate and Rhythm: Normal rate and regular rhythm.      Heart sounds: Normal heart sounds.   Pulmonary:      Effort: Pulmonary effort is normal.      " Breath sounds: Normal breath sounds. No wheezing or rhonchi.   Musculoskeletal:      Cervical back: Neck supple.      Right lower leg: No edema.      Left lower leg: No edema.   Skin:     General: Skin is warm and dry.   Neurological:      Mental Status: He is alert and oriented to person, place, and time.      Cranial Nerves: No cranial nerve deficit.   Psychiatric:         Mood and Affect: Mood and affect normal.         Behavior: Behavior normal.         Thought Content: Thought content normal.         Judgment: Judgment normal.        Result Review :            Assessment and Plan    Diagnoses and all orders for this visit:    1. Essential hypertension (Primary)  -     CBC & Differential  -     Comprehensive Metabolic Panel  -     Lipid Panel  -     TSH Rfx On Abnormal To Free T4  -     Urinalysis With Culture If Indicated -  -     amLODIPine (NORVASC) 10 MG tablet; Take 1 tablet by mouth Daily.  Dispense: 90 tablet; Refill: 1  -     losartan (COZAAR) 100 MG tablet; Take 1 tablet by mouth Daily.  Dispense: 90 tablet; Refill: 1    2. Fatigue, unspecified type  -     Iron Profile  -     Ferritin    3. Peripheral edema  -     hydrALAZINE (APRESOLINE) 50 MG tablet; Take 2 tablets in the AM and 1 tablet in the PM  Dispense: 90 tablet; Refill: 2    4. Hypothyroidism, unspecified type  -     TSH Rfx On Abnormal To Free T4  -     levothyroxine (SYNTHROID, LEVOTHROID) 50 MCG tablet; Take 1 tablet by mouth Daily.  Dispense: 90 tablet; Refill: 1    5. Vitamin D deficiency  -     Vitamin D 25 Hydroxy    6. Snoring  -     Ambulatory Referral to Sleep Medicine    7. Chronic fatigue  -     Ambulatory Referral to Sleep Medicine    8. Morbid (severe) obesity due to excess calories (HCC)  -     Ambulatory Referral to Sleep Medicine    9. Shortness of breath  -     Ambulatory Referral to Sleep Medicine    Other orders  -     diclofenac (VOLTAREN) 75 MG EC tablet; Take 1 tablet by mouth 2 (Two) Times a Day.  Dispense: 180 tablet;  Refill: 1        Follow Up   Return in about 6 months (around 6/16/2022) for Next scheduled follow up.  Patient was given instructions and counseling regarding his condition or for health maintenance advice. Please see specific information pulled into the AVS if appropriate.

## 2021-12-21 DIAGNOSIS — E55.9 VITAMIN D DEFICIENCY: ICD-10-CM

## 2021-12-22 RX ORDER — ERGOCALCIFEROL 1.25 MG/1
CAPSULE ORAL
Qty: 12 CAPSULE | Refills: 0 | OUTPATIENT
Start: 2021-12-22

## 2022-03-16 DIAGNOSIS — R60.9 PERIPHERAL EDEMA: ICD-10-CM

## 2022-03-16 RX ORDER — HYDRALAZINE HYDROCHLORIDE 50 MG/1
TABLET, FILM COATED ORAL
Qty: 90 TABLET | Refills: 0 | Status: SHIPPED | OUTPATIENT
Start: 2022-03-16 | End: 2022-10-25 | Stop reason: SDUPTHER

## 2022-10-17 RX ORDER — FLECAINIDE ACETATE 50 MG/1
TABLET ORAL
Qty: 60 TABLET | Refills: 0 | OUTPATIENT
Start: 2022-10-17

## 2022-10-17 RX ORDER — DICLOFENAC SODIUM 75 MG/1
TABLET, DELAYED RELEASE ORAL
Qty: 180 TABLET | Refills: 0 | OUTPATIENT
Start: 2022-10-17

## 2022-10-25 ENCOUNTER — OFFICE VISIT (OUTPATIENT)
Dept: FAMILY MEDICINE CLINIC | Facility: CLINIC | Age: 32
End: 2022-10-25

## 2022-10-25 VITALS
WEIGHT: 315 LBS | DIASTOLIC BLOOD PRESSURE: 98 MMHG | OXYGEN SATURATION: 96 % | SYSTOLIC BLOOD PRESSURE: 148 MMHG | HEIGHT: 76 IN | TEMPERATURE: 96.9 F | BODY MASS INDEX: 38.36 KG/M2 | HEART RATE: 108 BPM

## 2022-10-25 DIAGNOSIS — Z11.59 NEED FOR HEPATITIS C SCREENING TEST: ICD-10-CM

## 2022-10-25 DIAGNOSIS — K08.89 PAIN, DENTAL: ICD-10-CM

## 2022-10-25 DIAGNOSIS — E66.01 CLASS 3 SEVERE OBESITY WITH SERIOUS COMORBIDITY AND BODY MASS INDEX (BMI) OF 45.0 TO 49.9 IN ADULT, UNSPECIFIED OBESITY TYPE: ICD-10-CM

## 2022-10-25 DIAGNOSIS — Z23 IMMUNIZATION DUE: ICD-10-CM

## 2022-10-25 DIAGNOSIS — R06.81 APNEA: ICD-10-CM

## 2022-10-25 DIAGNOSIS — I10 ESSENTIAL HYPERTENSION: Primary | ICD-10-CM

## 2022-10-25 DIAGNOSIS — R60.9 PERIPHERAL EDEMA: ICD-10-CM

## 2022-10-25 DIAGNOSIS — E03.9 HYPOTHYROIDISM, UNSPECIFIED TYPE: ICD-10-CM

## 2022-10-25 DIAGNOSIS — M25.50 ARTHRALGIA, UNSPECIFIED JOINT: ICD-10-CM

## 2022-10-25 LAB
ALBUMIN SERPL-MCNC: 4.2 G/DL (ref 3.5–5.2)
ALBUMIN/GLOB SERPL: 1.3 G/DL
ALP SERPL-CCNC: 119 U/L (ref 39–117)
ALT SERPL W P-5'-P-CCNC: 23 U/L (ref 1–41)
AMORPH URATE CRY URNS QL MICRO: ABNORMAL /HPF
ANION GAP SERPL CALCULATED.3IONS-SCNC: 10.1 MMOL/L (ref 5–15)
AST SERPL-CCNC: 30 U/L (ref 1–40)
BACTERIA UR QL AUTO: ABNORMAL /HPF
BASOPHILS # BLD AUTO: 0.07 10*3/MM3 (ref 0–0.2)
BASOPHILS NFR BLD AUTO: 0.6 % (ref 0–1.5)
BILIRUB SERPL-MCNC: 0.5 MG/DL (ref 0–1.2)
BILIRUB UR QL STRIP: NEGATIVE
BUN SERPL-MCNC: 9 MG/DL (ref 6–20)
BUN/CREAT SERPL: 6.6 (ref 7–25)
CALCIUM SPEC-SCNC: 9.5 MG/DL (ref 8.6–10.5)
CHLORIDE SERPL-SCNC: 101 MMOL/L (ref 98–107)
CHOLEST SERPL-MCNC: 133 MG/DL (ref 0–200)
CLARITY UR: ABNORMAL
CO2 SERPL-SCNC: 27.9 MMOL/L (ref 22–29)
COLOR UR: ABNORMAL
CREAT SERPL-MCNC: 1.36 MG/DL (ref 0.76–1.27)
DEPRECATED RDW RBC AUTO: 38.7 FL (ref 37–54)
EGFRCR SERPLBLD CKD-EPI 2021: 70.9 ML/MIN/1.73
EOSINOPHIL # BLD AUTO: 0.44 10*3/MM3 (ref 0–0.4)
EOSINOPHIL NFR BLD AUTO: 3.6 % (ref 0.3–6.2)
ERYTHROCYTE [DISTWIDTH] IN BLOOD BY AUTOMATED COUNT: 13.3 % (ref 12.3–15.4)
GLOBULIN UR ELPH-MCNC: 3.3 GM/DL
GLUCOSE SERPL-MCNC: 98 MG/DL (ref 65–99)
GLUCOSE UR STRIP-MCNC: NEGATIVE MG/DL
HCT VFR BLD AUTO: 47.9 % (ref 37.5–51)
HCV AB SER DONR QL: NORMAL
HDLC SERPL-MCNC: 30 MG/DL (ref 40–60)
HGB BLD-MCNC: 16.1 G/DL (ref 13–17.7)
HGB UR QL STRIP.AUTO: NEGATIVE
HYALINE CASTS UR QL AUTO: ABNORMAL /LPF
IMM GRANULOCYTES # BLD AUTO: 0.05 10*3/MM3 (ref 0–0.05)
IMM GRANULOCYTES NFR BLD AUTO: 0.4 % (ref 0–0.5)
KETONES UR QL STRIP: ABNORMAL
LDLC SERPL CALC-MCNC: 82 MG/DL (ref 0–100)
LDLC/HDLC SERPL: 2.69 {RATIO}
LEUKOCYTE ESTERASE UR QL STRIP.AUTO: NEGATIVE
LYMPHOCYTES # BLD AUTO: 2 10*3/MM3 (ref 0.7–3.1)
LYMPHOCYTES NFR BLD AUTO: 16.5 % (ref 19.6–45.3)
MCH RBC QN AUTO: 27.3 PG (ref 26.6–33)
MCHC RBC AUTO-ENTMCNC: 33.6 G/DL (ref 31.5–35.7)
MCV RBC AUTO: 81.2 FL (ref 79–97)
MONOCYTES # BLD AUTO: 0.75 10*3/MM3 (ref 0.1–0.9)
MONOCYTES NFR BLD AUTO: 6.2 % (ref 5–12)
NEUTROPHILS NFR BLD AUTO: 72.7 % (ref 42.7–76)
NEUTROPHILS NFR BLD AUTO: 8.81 10*3/MM3 (ref 1.7–7)
NITRITE UR QL STRIP: NEGATIVE
NRBC BLD AUTO-RTO: 0 /100 WBC (ref 0–0.2)
PH UR STRIP.AUTO: 5.5 [PH] (ref 5–8)
PLATELET # BLD AUTO: 265 10*3/MM3 (ref 140–450)
PMV BLD AUTO: 10.3 FL (ref 6–12)
POTASSIUM SERPL-SCNC: 4.3 MMOL/L (ref 3.5–5.2)
PROT SERPL-MCNC: 7.5 G/DL (ref 6–8.5)
PROT UR QL STRIP: ABNORMAL
RBC # BLD AUTO: 5.9 10*6/MM3 (ref 4.14–5.8)
RBC # UR STRIP: ABNORMAL /HPF
REF LAB TEST METHOD: ABNORMAL
SODIUM SERPL-SCNC: 139 MMOL/L (ref 136–145)
SP GR UR STRIP: >=1.03 (ref 1–1.03)
SQUAMOUS #/AREA URNS HPF: ABNORMAL /HPF
TRIGL SERPL-MCNC: 112 MG/DL (ref 0–150)
TSH SERPL DL<=0.05 MIU/L-ACNC: 2.64 UIU/ML (ref 0.27–4.2)
UROBILINOGEN UR QL STRIP: ABNORMAL
VLDLC SERPL-MCNC: 21 MG/DL (ref 5–40)
WBC # UR STRIP: ABNORMAL /HPF
WBC NRBC COR # BLD: 12.12 10*3/MM3 (ref 3.4–10.8)

## 2022-10-25 PROCEDURE — 80061 LIPID PANEL: CPT | Performed by: NURSE PRACTITIONER

## 2022-10-25 PROCEDURE — 90715 TDAP VACCINE 7 YRS/> IM: CPT | Performed by: NURSE PRACTITIONER

## 2022-10-25 PROCEDURE — 99214 OFFICE O/P EST MOD 30 MIN: CPT | Performed by: NURSE PRACTITIONER

## 2022-10-25 PROCEDURE — 86803 HEPATITIS C AB TEST: CPT | Performed by: NURSE PRACTITIONER

## 2022-10-25 PROCEDURE — 81001 URINALYSIS AUTO W/SCOPE: CPT | Performed by: NURSE PRACTITIONER

## 2022-10-25 PROCEDURE — 84443 ASSAY THYROID STIM HORMONE: CPT | Performed by: NURSE PRACTITIONER

## 2022-10-25 PROCEDURE — 80053 COMPREHEN METABOLIC PANEL: CPT | Performed by: NURSE PRACTITIONER

## 2022-10-25 PROCEDURE — 85025 COMPLETE CBC W/AUTO DIFF WBC: CPT | Performed by: NURSE PRACTITIONER

## 2022-10-25 PROCEDURE — 90471 IMMUNIZATION ADMIN: CPT | Performed by: NURSE PRACTITIONER

## 2022-10-25 RX ORDER — HYDRALAZINE HYDROCHLORIDE 50 MG/1
TABLET, FILM COATED ORAL
Qty: 90 TABLET | Refills: 0 | Status: SHIPPED | OUTPATIENT
Start: 2022-10-25

## 2022-10-25 RX ORDER — AMOXICILLIN 500 MG/1
500 CAPSULE ORAL 2 TIMES DAILY
Qty: 20 CAPSULE | Refills: 0 | Status: SHIPPED | OUTPATIENT
Start: 2022-10-25

## 2022-10-25 RX ORDER — LEVOTHYROXINE SODIUM 0.05 MG/1
50 TABLET ORAL DAILY
Qty: 90 TABLET | Refills: 1 | Status: SHIPPED | OUTPATIENT
Start: 2022-10-25

## 2022-10-25 RX ORDER — TRAZODONE HYDROCHLORIDE 300 MG/1
300 TABLET ORAL NIGHTLY
COMMUNITY

## 2022-10-25 RX ORDER — DICLOFENAC SODIUM 75 MG/1
75 TABLET, DELAYED RELEASE ORAL 2 TIMES DAILY
Qty: 180 TABLET | Refills: 1 | Status: SHIPPED | OUTPATIENT
Start: 2022-10-25

## 2022-10-25 RX ORDER — AMLODIPINE BESYLATE 10 MG/1
10 TABLET ORAL DAILY
Qty: 90 TABLET | Refills: 1 | Status: SHIPPED | OUTPATIENT
Start: 2022-10-25

## 2022-10-25 RX ORDER — LOSARTAN POTASSIUM 100 MG/1
100 TABLET ORAL DAILY
Qty: 90 TABLET | Refills: 1 | Status: SHIPPED | OUTPATIENT
Start: 2022-10-25

## 2022-10-25 RX ORDER — BUSPIRONE HYDROCHLORIDE 10 MG/1
20 TABLET ORAL 2 TIMES DAILY
COMMUNITY
Start: 2022-08-15

## 2022-10-25 RX ORDER — DOXEPIN HYDROCHLORIDE 10 MG/1
CAPSULE ORAL
COMMUNITY
Start: 2022-09-16

## 2022-10-25 RX ORDER — CARIPRAZINE 4.5 MG/1
1 CAPSULE, GELATIN COATED ORAL DAILY
COMMUNITY
Start: 2022-07-31

## 2022-10-25 NOTE — PROGRESS NOTES
Venipuncture Blood Specimen Collection  Venipuncture performed in left arm by Elda Barrera with good hemostasis. Patient tolerated the procedure well without complications.   10/25/22   Elda Barrera

## 2022-10-25 NOTE — PROGRESS NOTES
Chief Complaint  Hypertension (Refills and labs, patient is fasting) and Snoring (Wants a sleep study)    PHQ-9 Total Score: 16    Subjective        Past Medical History:   Diagnosis Date   • Allergic rhinitis    • Anxiety    • Asperger's syndrome    • Colon polyps 07/18/2014   • Depression    • GERD (gastroesophageal reflux disease) 01/23/2015   • Hematuria, microscopic 03/25/2014    3/25//2014 large blood, > 300 protein u/a in office on repeat from outpt labs.   • Hypertension    • Microscopic hematuria 3/25/2014   • Resistant hypertension 02/03/2020   • Tourette's    • Tourette's disorder 03/06/2014     Social History     Tobacco Use   • Smoking status: Some Days     Packs/day: 0.05     Types: Cigarettes   • Smokeless tobacco: Never   • Tobacco comments:     Stopped smoking at age 29; social smoker   Vaping Use   • Vaping Use: Never used   Substance Use Topics   • Alcohol use: Never   • Drug use: Not Currently      Current Outpatient Medications on File Prior to Visit   Medication Sig   • busPIRone (BUSPAR) 10 MG tablet Take 2 tablets by mouth 2 (Two) Times a Day.   • doxepin (SINEquan) 10 MG capsule    • flecainide (TAMBOCOR) 50 MG tablet TAKE 1 TABLET(50 MG) BY MOUTH EVERY 12 HOURS   • mirtazapine (REMERON SOL-TAB) 45 MG disintegrating tablet Place 1 tablet on the tongue every night at bedtime.   • traZODone (DESYREL) 300 MG tablet Take 1 tablet by mouth Every Night.   • vitamin D (ERGOCALCIFEROL) 1.25 MG (50844 UT) capsule capsule TAKE 1 CAPSULE BY MOUTH EVERY 7 DAYS   • Vraylar 4.5 MG capsule Take 1 capsule by mouth Daily.   • [DISCONTINUED] amLODIPine (NORVASC) 10 MG tablet Take 1 tablet by mouth Daily.   • [DISCONTINUED] diclofenac (VOLTAREN) 75 MG EC tablet Take 1 tablet by mouth 2 (Two) Times a Day.   • [DISCONTINUED] hydrALAZINE (APRESOLINE) 50 MG tablet TAKE 2 TABLETS BY MOUTH EVERY MORNING AND 1 TABLET EVERY EVENING   • [DISCONTINUED] levothyroxine (SYNTHROID, LEVOTHROID) 50 MCG tablet Take 1 tablet by  "mouth Daily.   • [DISCONTINUED] losartan (COZAAR) 100 MG tablet Take 1 tablet by mouth Daily.   • [DISCONTINUED] Vraylar 3 MG capsule capsule Take 3 mg by mouth Every Morning.     No current facility-administered medications on file prior to visit.      Allergies   Allergen Reactions   • Shellfish Allergy Swelling   • Morphine Other (See Comments)     unk      Health Maintenance Due   Topic Date Due   • ANNUAL PHYSICAL  Never done   • Pneumococcal Vaccine 0-64 (1 - PCV) Never done   • HEPATITIS C SCREENING  Never done   • COVID-19 Vaccine (3 - Booster for Moderna series) 01/16/2022      Anthony Tay presents to [facility]  History of Present Illness  Here to follow up on chronic health conditions, obtain refills, and have fasting labs drawn.     HTN: elevated in office; does not monitor at home (does not have a large enough cuff). Pt state he has been out of medication.     Pt is down about 40lbs through diet changes and decreased portions and less snacking. He is drinking increased amounts of water.     Pt also notes that he snores and family has witnessed apenic episodes. Pt states he wakes feeling tired and drowsy throughout the day. He usually goes to bed about 11:30pm and falls asleep around 1am and then wakes up multiple times through the night about every 2-3 hours and then gets up about 9:30am for the day. Pt watches tv for a little bit then brushes teeth and goes to bed.     Pt is established with Psychiatry. Symptoms are stable. Denies SI.     Pt states he had a bad tooth with foul drainage that has improved but still has a spot that bothers him.       Objective   Vital Signs:   /98 (BP Location: Left arm)   Pulse 108   Temp 96.9 °F (36.1 °C)   Ht 193 cm (76\")   Wt (!) 177 kg (390 lb)   SpO2 96%   BMI 47.47 kg/m²     Review of Systems   Constitutional: Negative for fatigue and unexpected weight loss.   Respiratory: Positive for shortness of breath (with " activity). Negative for cough and wheezing.    Cardiovascular: Negative for chest pain and palpitations.   Neurological: Positive for headache (since being out of medication).      Physical Exam  Vitals reviewed.   Constitutional:       General: He is not in acute distress.     Appearance: Normal appearance. He is well-developed.   HENT:      Head: Normocephalic and atraumatic.   Eyes:      Conjunctiva/sclera: Conjunctivae normal.      Pupils: Pupils are equal, round, and reactive to light.   Cardiovascular:      Rate and Rhythm: Normal rate and regular rhythm.      Heart sounds: Normal heart sounds.   Pulmonary:      Effort: Pulmonary effort is normal.      Breath sounds: Normal breath sounds.   Musculoskeletal:      Cervical back: Neck supple.      Right lower leg: No edema.      Left lower leg: No edema.   Skin:     General: Skin is warm and dry.   Neurological:      Mental Status: He is alert and oriented to person, place, and time.      Cranial Nerves: No cranial nerve deficit.   Psychiatric:         Mood and Affect: Mood and affect normal.         Behavior: Behavior normal.         Thought Content: Thought content normal.         Judgment: Judgment normal.        Result Review :                 Assessment and Plan    Diagnoses and all orders for this visit:    1. Essential hypertension (Primary)  -     losartan (COZAAR) 100 MG tablet; Take 1 tablet by mouth Daily.  Dispense: 90 tablet; Refill: 1  -     amLODIPine (NORVASC) 10 MG tablet; Take 1 tablet by mouth Daily.  Dispense: 90 tablet; Refill: 1  -     CBC & Differential  -     Comprehensive Metabolic Panel  -     Lipid Panel  -     Urinalysis With Culture If Indicated - Urine, Clean Catch    2. Hypothyroidism, unspecified type  -     levothyroxine (SYNTHROID, LEVOTHROID) 50 MCG tablet; Take 1 tablet by mouth Daily.  Dispense: 90 tablet; Refill: 1  -     TSH Rfx On Abnormal To Free T4    3. Peripheral edema  -     hydrALAZINE (APRESOLINE) 50 MG tablet; TAKE  2 TABLETS BY MOUTH EVERY MORNING AND 1 TABLET EVERY EVENING  Dispense: 90 tablet; Refill: 0    4. Pain, dental  -     amoxicillin (AMOXIL) 500 MG capsule; Take 1 capsule by mouth 2 (Two) Times a Day.  Dispense: 20 capsule; Refill: 0    5. Arthralgia, unspecified joint  -     diclofenac (VOLTAREN) 75 MG EC tablet; Take 1 tablet by mouth 2 (Two) Times a Day.  Dispense: 180 tablet; Refill: 1    6. Apnea  -     Ambulatory Referral to Sleep Medicine    7. Class 3 severe obesity with serious comorbidity and body mass index (BMI) of 45.0 to 49.9 in adult, unspecified obesity type (HCC)  -     Ambulatory Referral to Sleep Medicine    8. Need for hepatitis C screening test  -     Hepatitis C Antibody    9. Immunization due  -     Tdap Vaccine Greater Than or Equal To 6yo IM        Follow Up   Return in about 6 months (around 4/25/2023) for Refills and fasting labs.  Patient was given instructions and counseling regarding his condition or for health maintenance advice. Please see specific information pulled into the AVS if appropriate.

## 2023-04-03 ENCOUNTER — TELEPHONE (OUTPATIENT)
Dept: FAMILY MEDICINE CLINIC | Facility: CLINIC | Age: 33
End: 2023-04-03
Payer: COMMERCIAL

## 2023-04-03 NOTE — TELEPHONE ENCOUNTER
Caller: Isaiah Tay    Relationship: Emergency Contact    Best call back number: [phone]    What is the best time to reach you: ANYTIME    Who are you requesting to speak with (clinical staff, provider,  specific staff member): CLINICAL    What was the call regarding: THE PATIENT'S BROTHER WOULD LIKE A CALL BACK TO DISCUSS PROTOCOL FOR [employer] PAPERWORK THAT IS NEEDING PCP TO FILL OUT    Do you require a callback: YES

## 2023-04-09 ENCOUNTER — APPOINTMENT (OUTPATIENT)
Dept: CT IMAGING | Facility: HOSPITAL | Age: 33
End: 2023-04-09
Payer: COMMERCIAL

## 2023-04-09 LAB
ALBUMIN SERPL-MCNC: 3.8 G/DL (ref 3.5–5.2)
ALBUMIN/GLOB SERPL: 1 G/DL
ALP SERPL-CCNC: 126 U/L (ref 39–117)
ALT SERPL W P-5'-P-CCNC: 23 U/L (ref 1–41)
ANION GAP SERPL CALCULATED.3IONS-SCNC: 13.6 MMOL/L (ref 5–15)
AST SERPL-CCNC: 26 U/L (ref 1–40)
BASOPHILS # BLD AUTO: 0.1 10*3/MM3 (ref 0–0.2)
BASOPHILS NFR BLD AUTO: 0.5 % (ref 0–1.5)
BILIRUB SERPL-MCNC: 0.4 MG/DL (ref 0–1.2)
BUN SERPL-MCNC: 10 MG/DL (ref 6–20)
BUN/CREAT SERPL: 8.4 (ref 7–25)
CALCIUM SPEC-SCNC: 9.1 MG/DL (ref 8.6–10.5)
CHLORIDE SERPL-SCNC: 100 MMOL/L (ref 98–107)
CO2 SERPL-SCNC: 23.4 MMOL/L (ref 22–29)
CREAT SERPL-MCNC: 1.19 MG/DL (ref 0.76–1.27)
DEPRECATED RDW RBC AUTO: 39.9 FL (ref 37–54)
EGFRCR SERPLBLD CKD-EPI 2021: 82.7 ML/MIN/1.73
EOSINOPHIL # BLD AUTO: 0.47 10*3/MM3 (ref 0–0.4)
EOSINOPHIL NFR BLD AUTO: 2.5 % (ref 0.3–6.2)
ERYTHROCYTE [DISTWIDTH] IN BLOOD BY AUTOMATED COUNT: 13.9 % (ref 12.3–15.4)
GLOBULIN UR ELPH-MCNC: 3.8 GM/DL
GLUCOSE SERPL-MCNC: 110 MG/DL (ref 65–99)
HCT VFR BLD AUTO: 52.8 % (ref 37.5–51)
HGB BLD-MCNC: 17.9 G/DL (ref 13–17.7)
HOLD SPECIMEN: NORMAL
HOLD SPECIMEN: NORMAL
IMM GRANULOCYTES # BLD AUTO: 0.09 10*3/MM3 (ref 0–0.05)
IMM GRANULOCYTES NFR BLD AUTO: 0.5 % (ref 0–0.5)
LIPASE SERPL-CCNC: 39 U/L (ref 13–60)
LYMPHOCYTES # BLD AUTO: 2.85 10*3/MM3 (ref 0.7–3.1)
LYMPHOCYTES NFR BLD AUTO: 15.3 % (ref 19.6–45.3)
MCH RBC QN AUTO: 27 PG (ref 26.6–33)
MCHC RBC AUTO-ENTMCNC: 33.9 G/DL (ref 31.5–35.7)
MCV RBC AUTO: 79.6 FL (ref 79–97)
MONOCYTES # BLD AUTO: 0.9 10*3/MM3 (ref 0.1–0.9)
MONOCYTES NFR BLD AUTO: 4.8 % (ref 5–12)
NEUTROPHILS NFR BLD AUTO: 14.22 10*3/MM3 (ref 1.7–7)
NEUTROPHILS NFR BLD AUTO: 76.4 % (ref 42.7–76)
NRBC BLD AUTO-RTO: 0 /100 WBC (ref 0–0.2)
PLATELET # BLD AUTO: 340 10*3/MM3 (ref 140–450)
PMV BLD AUTO: 9.8 FL (ref 6–12)
POTASSIUM SERPL-SCNC: 3.7 MMOL/L (ref 3.5–5.2)
PROT SERPL-MCNC: 7.6 G/DL (ref 6–8.5)
RBC # BLD AUTO: 6.63 10*6/MM3 (ref 4.14–5.8)
SODIUM SERPL-SCNC: 137 MMOL/L (ref 136–145)
WBC NRBC COR # BLD: 18.63 10*3/MM3 (ref 3.4–10.8)
WHOLE BLOOD HOLD COAG: NORMAL
WHOLE BLOOD HOLD SPECIMEN: NORMAL

## 2023-04-09 PROCEDURE — 85025 COMPLETE CBC W/AUTO DIFF WBC: CPT

## 2023-04-09 PROCEDURE — 99284 EMERGENCY DEPT VISIT MOD MDM: CPT

## 2023-04-09 PROCEDURE — 80053 COMPREHEN METABOLIC PANEL: CPT

## 2023-04-09 PROCEDURE — 36415 COLL VENOUS BLD VENIPUNCTURE: CPT

## 2023-04-09 PROCEDURE — 74176 CT ABD & PELVIS W/O CONTRAST: CPT

## 2023-04-09 PROCEDURE — 83690 ASSAY OF LIPASE: CPT

## 2023-04-09 RX ORDER — LOSARTAN POTASSIUM 50 MG/1
100 TABLET ORAL ONCE
Status: COMPLETED | OUTPATIENT
Start: 2023-04-09 | End: 2023-04-10

## 2023-04-09 RX ORDER — AMLODIPINE BESYLATE 5 MG/1
10 TABLET ORAL ONCE
Status: COMPLETED | OUTPATIENT
Start: 2023-04-09 | End: 2023-04-10

## 2023-04-09 RX ORDER — SODIUM CHLORIDE 0.9 % (FLUSH) 0.9 %
10 SYRINGE (ML) INJECTION AS NEEDED
Status: DISCONTINUED | OUTPATIENT
Start: 2023-04-09 | End: 2023-04-10 | Stop reason: HOSPADM

## 2023-04-09 RX ORDER — HYDRALAZINE HYDROCHLORIDE 25 MG/1
50 TABLET, FILM COATED ORAL ONCE
Status: DISCONTINUED | OUTPATIENT
Start: 2023-04-09 | End: 2023-04-10

## 2023-04-09 RX ORDER — FLECAINIDE ACETATE 50 MG/1
50 TABLET ORAL ONCE
Status: COMPLETED | OUTPATIENT
Start: 2023-04-09 | End: 2023-04-10

## 2023-04-09 RX ORDER — KETOROLAC TROMETHAMINE 15 MG/ML
15 INJECTION, SOLUTION INTRAMUSCULAR; INTRAVENOUS ONCE
Status: COMPLETED | OUTPATIENT
Start: 2023-04-09 | End: 2023-04-10

## 2023-04-09 RX ORDER — ONDANSETRON 2 MG/ML
4 INJECTION INTRAMUSCULAR; INTRAVENOUS ONCE
Status: COMPLETED | OUTPATIENT
Start: 2023-04-09 | End: 2023-04-10

## 2023-04-10 ENCOUNTER — HOSPITAL ENCOUNTER (EMERGENCY)
Facility: HOSPITAL | Age: 33
Discharge: LEFT BEFORE DISCHARGE | End: 2023-04-10
Attending: STUDENT IN AN ORGANIZED HEALTH CARE EDUCATION/TRAINING PROGRAM | Admitting: STUDENT IN AN ORGANIZED HEALTH CARE EDUCATION/TRAINING PROGRAM
Payer: COMMERCIAL

## 2023-04-10 VITALS
HEIGHT: 77 IN | WEIGHT: 315 LBS | BODY MASS INDEX: 37.19 KG/M2 | SYSTOLIC BLOOD PRESSURE: 162 MMHG | HEART RATE: 117 BPM | OXYGEN SATURATION: 94 % | TEMPERATURE: 98 F | DIASTOLIC BLOOD PRESSURE: 109 MMHG | RESPIRATION RATE: 16 BRPM

## 2023-04-10 DIAGNOSIS — I88.0 MESENTERIC ADENITIS: ICD-10-CM

## 2023-04-10 DIAGNOSIS — R10.84 GENERALIZED ABDOMINAL PAIN: Primary | ICD-10-CM

## 2023-04-10 DIAGNOSIS — Z53.29 LEFT AGAINST MEDICAL ADVICE: ICD-10-CM

## 2023-04-10 DIAGNOSIS — I10 HYPERTENSION, UNSPECIFIED TYPE: ICD-10-CM

## 2023-04-10 LAB
BACTERIA UR QL AUTO: ABNORMAL /HPF
BILIRUB UR QL STRIP: ABNORMAL
CLARITY UR: ABNORMAL
COD CRY URNS QL: ABNORMAL /HPF
COLOR UR: ABNORMAL
GLUCOSE UR STRIP-MCNC: NEGATIVE MG/DL
HGB UR QL STRIP.AUTO: ABNORMAL
HYALINE CASTS UR QL AUTO: ABNORMAL /LPF
KETONES UR QL STRIP: ABNORMAL
LEUKOCYTE ESTERASE UR QL STRIP.AUTO: ABNORMAL
MUCOUS THREADS URNS QL MICRO: ABNORMAL /HPF
NITRITE UR QL STRIP: NEGATIVE
PH UR STRIP.AUTO: 5.5 [PH] (ref 5–8)
PROT UR QL STRIP: ABNORMAL
QT INTERVAL: 311 MS
RBC # UR STRIP: ABNORMAL /HPF
REF LAB TEST METHOD: ABNORMAL
SP GR UR STRIP: >=1.03 (ref 1–1.03)
SQUAMOUS #/AREA URNS HPF: ABNORMAL /HPF
UROBILINOGEN UR QL STRIP: ABNORMAL
WBC # UR STRIP: ABNORMAL /HPF

## 2023-04-10 PROCEDURE — 96375 TX/PRO/DX INJ NEW DRUG ADDON: CPT

## 2023-04-10 PROCEDURE — 96376 TX/PRO/DX INJ SAME DRUG ADON: CPT

## 2023-04-10 PROCEDURE — 25010000002 ONDANSETRON PER 1 MG: Performed by: REGISTERED NURSE

## 2023-04-10 PROCEDURE — 96374 THER/PROPH/DIAG INJ IV PUSH: CPT

## 2023-04-10 PROCEDURE — 25010000002 KETOROLAC TROMETHAMINE PER 15 MG: Performed by: REGISTERED NURSE

## 2023-04-10 PROCEDURE — 25010000002 HYDRALAZINE PER 20 MG: Performed by: NURSE PRACTITIONER

## 2023-04-10 PROCEDURE — 93010 ELECTROCARDIOGRAM REPORT: CPT | Performed by: INTERNAL MEDICINE

## 2023-04-10 PROCEDURE — 81001 URINALYSIS AUTO W/SCOPE: CPT

## 2023-04-10 PROCEDURE — 93005 ELECTROCARDIOGRAM TRACING: CPT | Performed by: NURSE PRACTITIONER

## 2023-04-10 RX ORDER — HYDRALAZINE HYDROCHLORIDE 20 MG/ML
20 INJECTION INTRAMUSCULAR; INTRAVENOUS ONCE
Status: COMPLETED | OUTPATIENT
Start: 2023-04-10 | End: 2023-04-10

## 2023-04-10 RX ORDER — ONDANSETRON 4 MG/1
4 TABLET, ORALLY DISINTEGRATING ORAL EVERY 8 HOURS PRN
Qty: 15 TABLET | Refills: 0 | Status: SHIPPED | OUTPATIENT
Start: 2023-04-10

## 2023-04-10 RX ORDER — MAGNESIUM CARB/ALUMINUM HYDROX 105-160MG
296 TABLET,CHEWABLE ORAL ONCE
Status: COMPLETED | OUTPATIENT
Start: 2023-04-10 | End: 2023-04-10

## 2023-04-10 RX ORDER — LABETALOL HYDROCHLORIDE 5 MG/ML
10 INJECTION, SOLUTION INTRAVENOUS ONCE
Status: COMPLETED | OUTPATIENT
Start: 2023-04-10 | End: 2023-04-10

## 2023-04-10 RX ORDER — POLYETHYLENE GLYCOL 3350 17 G/17G
17 POWDER, FOR SOLUTION ORAL DAILY
Qty: 7 PACKET | Refills: 0 | Status: SHIPPED | OUTPATIENT
Start: 2023-04-10 | End: 2023-04-17

## 2023-04-10 RX ORDER — DICYCLOMINE HCL 20 MG
20 TABLET ORAL EVERY 8 HOURS PRN
Qty: 30 TABLET | Refills: 0 | Status: SHIPPED | OUTPATIENT
Start: 2023-04-10

## 2023-04-10 RX ADMIN — AMLODIPINE BESYLATE 10 MG: 5 TABLET ORAL at 04:49

## 2023-04-10 RX ADMIN — LABETALOL HYDROCHLORIDE 10 MG: 5 INJECTION, SOLUTION INTRAVENOUS at 07:20

## 2023-04-10 RX ADMIN — SODIUM CHLORIDE 1000 ML: 9 INJECTION, SOLUTION INTRAVENOUS at 05:03

## 2023-04-10 RX ADMIN — ONDANSETRON 4 MG: 2 INJECTION INTRAMUSCULAR; INTRAVENOUS at 04:57

## 2023-04-10 RX ADMIN — LABETALOL HYDROCHLORIDE 10 MG: 5 INJECTION, SOLUTION INTRAVENOUS at 08:13

## 2023-04-10 RX ADMIN — LOSARTAN POTASSIUM 100 MG: 50 TABLET, FILM COATED ORAL at 04:49

## 2023-04-10 RX ADMIN — FLECAINIDE ACETATE 50 MG: 50 TABLET ORAL at 04:50

## 2023-04-10 RX ADMIN — KETOROLAC TROMETHAMINE 15 MG: 15 INJECTION, SOLUTION INTRAMUSCULAR; INTRAVENOUS at 04:57

## 2023-04-10 RX ADMIN — HYDRALAZINE HYDROCHLORIDE 20 MG: 20 INJECTION, SOLUTION INTRAMUSCULAR; INTRAVENOUS at 05:03

## 2023-04-10 RX ADMIN — MAGNESIUM CITRATE 296 ML: 1.75 LIQUID ORAL at 04:51

## 2023-04-10 NOTE — ED PROVIDER NOTES
Time: 10:49 PM EDT  Date of encounter:  4/9/2023  Independent Historian/Clinical History and Information was obtained by:   Patient and Family  Chief Complaint   Patient presents with   • Abdominal Pain       History is limited by: N/A    History of Present Illness:  Patient is a 33 y.o. year old male who presents to the emergency department for evaluation of right upper, epigastric and left lower quadrant abdominal pain as well as nausea/vomiting, for 3 to 4 days.  Patient states that pain is severe and he has not been able to keep anything down.  He took his blood pressure medication this morning but was also not able to keep that down.  Typically he takes Norvasc, flecainide, hydralazine and losartan in the a.m.  He denies fever or chills.  Patient thought he may be constipated since has not had a bowel movement in 2 days had a very tiny 1 yesterday so he took an Ex-Lax earlier with no results    HPI    Patient Care Team  Primary Care Provider: Hillary Worthington APRN    Past Medical History:     Allergies   Allergen Reactions   • Shellfish Allergy Swelling   • Morphine Other (See Comments)     unk     Past Medical History:   Diagnosis Date   • Allergic rhinitis    • Anxiety    • Asperger's syndrome    • Colon polyps 07/18/2014   • Depression    • GERD (gastroesophageal reflux disease) 01/23/2015   • Hematuria, microscopic 03/25/2014    3/25//2014 large blood, > 300 protein u/a in office on repeat from outpt labs.   • Hypertension    • Microscopic hematuria 3/25/2014   • Resistant hypertension 02/03/2020   • Tourette's    • Tourette's disorder 03/06/2014     Past Surgical History:   Procedure Laterality Date   • COLONOSCOPY  07/18/2014   • CYSTOSCOPY  04/2014    WNL   • POLYPECTOMY  07/18/2014     Family History   Problem Relation Age of Onset   • Mental illness Mother    • No Known Problems Father    • Stroke Other    • Diabetes type II Other        Home Medications:  Prior to Admission medications    Medication  Sig Start Date End Date Taking? Authorizing Provider   amLODIPine (NORVASC) 10 MG tablet Take 1 tablet by mouth Daily. 10/25/22   Hillary Worthington APRN   amoxicillin (AMOXIL) 500 MG capsule Take 1 capsule by mouth 2 (Two) Times a Day. 10/25/22   Hillary Worthington APRN   busPIRone (BUSPAR) 10 MG tablet Take 2 tablets by mouth 2 (Two) Times a Day. 8/15/22   Yobany Renner MD   diclofenac (VOLTAREN) 75 MG EC tablet Take 1 tablet by mouth 2 (Two) Times a Day. 10/25/22   Hillary Worthington APRN   doxepin (SINEquan) 10 MG capsule  9/16/22   Yobany Renner MD   flecainide (TAMBOCOR) 50 MG tablet TAKE 1 TABLET(50 MG) BY MOUTH EVERY 12 HOURS 7/27/21   BC Mendieta MD   hydrALAZINE (APRESOLINE) 50 MG tablet TAKE 2 TABLETS BY MOUTH EVERY MORNING AND 1 TABLET EVERY EVENING 10/25/22   Hillary Worthington APRN   levothyroxine (SYNTHROID, LEVOTHROID) 50 MCG tablet Take 1 tablet by mouth Daily. 10/25/22   Hillary Worthington APRN   losartan (COZAAR) 100 MG tablet Take 1 tablet by mouth Daily. 10/25/22   Hillary Worthington APRN   mirtazapine (REMERON SOL-TAB) 45 MG disintegrating tablet Place 1 tablet on the tongue every night at bedtime. 11/24/21   Yobany Renner MD   traZODone (DESYREL) 300 MG tablet Take 1 tablet by mouth Every Night.    Yobany Renner MD   vitamin D (ERGOCALCIFEROL) 1.25 MG (69037 UT) capsule capsule TAKE 1 CAPSULE BY MOUTH EVERY 7 DAYS 9/30/21   Hillary Worthington APRN   Vraylar 4.5 MG capsule Take 1 capsule by mouth Daily. 7/31/22   Yobany Renner MD        Social History:   Social History     Tobacco Use   • Smoking status: Some Days     Packs/day: 0.05     Types: Cigarettes   • Smokeless tobacco: Never   • Tobacco comments:     Stopped smoking at age 29; social smoker   Vaping Use   • Vaping Use: Never used   Substance Use Topics   • Alcohol use: Never   • Drug use: Not Currently         Review of Systems:  Review of Systems   Constitutional: Negative for chills and fever.  "  HENT: Negative for congestion, ear pain and sore throat.    Eyes: Negative for pain.   Respiratory: Negative for cough, chest tightness and shortness of breath.    Cardiovascular: Negative for chest pain.   Gastrointestinal: Positive for abdominal pain, diarrhea, nausea and vomiting.   Genitourinary: Negative for flank pain and hematuria.   Musculoskeletal: Negative for back pain, joint swelling and neck pain.   Skin: Negative for pallor.   Neurological: Negative for seizures and headaches.   Hematological: Negative.    Psychiatric/Behavioral: Negative.    All other systems reviewed and are negative.       Physical Exam:  BP (!) 162/109   Pulse 117   Temp 98 °F (36.7 °C) (Oral)   Resp 16   Ht 195.6 cm (77\")   Wt (!) 172 kg (379 lb 3.1 oz)   SpO2 94%   BMI 44.97 kg/m²     Physical Exam  Vitals and nursing note reviewed.   Constitutional:       General: He is not in acute distress.     Appearance: Normal appearance. He is obese. He is not toxic-appearing.   HENT:      Head: Normocephalic and atraumatic.      Mouth/Throat:      Mouth: Mucous membranes are moist.   Eyes:      General: No scleral icterus.     Extraocular Movements: Extraocular movements intact.      Conjunctiva/sclera: Conjunctivae normal.      Pupils: Pupils are equal, round, and reactive to light.   Cardiovascular:      Rate and Rhythm: Regular rhythm. Tachycardia present.      Pulses: Normal pulses.      Heart sounds: Normal heart sounds.   Pulmonary:      Effort: Pulmonary effort is normal. No respiratory distress.      Breath sounds: Normal breath sounds.   Abdominal:      General: Abdomen is protuberant. Bowel sounds are normal. There is no distension.      Palpations: Abdomen is soft.      Tenderness: There is abdominal tenderness in the right upper quadrant, epigastric area and left lower quadrant. There is no right CVA tenderness or left CVA tenderness.      Hernia: No hernia is present.   Musculoskeletal:         General: Normal range " of motion.      Cervical back: Normal range of motion and neck supple.   Skin:     General: Skin is warm and dry.      Coloration: Skin is not cyanotic.   Neurological:      Mental Status: He is alert and oriented to person, place, and time. Mental status is at baseline.   Psychiatric:         Attention and Perception: Attention and perception normal.         Mood and Affect: Mood is anxious.              Procedures:  Procedures      Medical Decision Making:      Comorbidities that affect care:    Hypertension, Smoking, Obesity    External Notes reviewed:    None      The following orders were placed and all results were independently analyzed by me:  Orders Placed This Encounter   Procedures   • CT Abdomen Pelvis Without Contrast   • [place] Draw   • Comprehensive Metabolic Panel   • Lipase   • Urinalysis With Microscopic If Indicated (No Culture) - Urine, Clean Catch   • CBC Auto Differential   • Urinalysis, Microscopic Only - Urine, Clean Catch   • NPO Diet NPO Type: Strict NPO   • Undress & Gown   • Measure blood pressure   • Inpatient Cardiology Consult   • ECG 12 Lead Tachycardia   • Insert Peripheral IV   • CBC & Differential   • Green Top (Gel)   • Lavender Top   • Gold Top - SST   • Light Blue Top       Medications Given in the Emergency Department:  Medications   sodium chloride 0.9 % flush 10 mL (has no administration in time range)   sodium chloride 0.9 % bolus 1,000 mL (1,000 mL Intravenous Not Given 4/10/23 0218)   sodium chloride 0.9 % bolus 1,000 mL (0 mL Intravenous Stopped 4/10/23 6713)   ondansetron (ZOFRAN) injection 4 mg (4 mg Intravenous Given 4/10/23 1497)   ketorolac (TORADOL) injection 15 mg (15 mg Intravenous Given 4/10/23 6837)   amLODIPine (NORVASC) tablet 10 mg (10 mg Oral Given 4/10/23 9899)   losartan (COZAAR) tablet 100 mg (100 mg Oral Given 4/10/23 0449)   flecainide (TAMBOCOR) tablet 50 mg (50 mg Oral Given 4/10/23 0450)   magnesium citrate solution 296 mL (296 mL Oral Given 4/10/23  0450)   hydrALAZINE (APRESOLINE) injection 20 mg (20 mg Intravenous Given 4/10/23 3456)   labetalol (NORMODYNE,TRANDATE) injection 10 mg (10 mg Intravenous Given 4/10/23 0720)   labetalol (NORMODYNE,TRANDATE) injection 10 mg (10 mg Intravenous Given 4/10/23 0813)        ED Course:    The patient was initially evaluated in the triage area where orders were placed. The patient was later dispositioned by Trino Trejo PA-C.      The patient was advised to stay for completion of workup which includes but is not limited to communication of labs and radiological results, reassessment and plan. The patient was advised that leaving prior to disposition by a provider could result in critical findings that are not communicated to the patient.          Labs:    Lab Results (last 24 hours)     Procedure Component Value Units Date/Time    CBC & Differential [[id number]]  (Abnormal) Collected: 04/09/23 2247    Specimen: Blood Updated: 04/09/23 2320    Narrative:      The following orders were created for panel order CBC & Differential.  Procedure                               Abnormality         Status                     ---------                               -----------         ------                     CBC Auto Differential[[id number]]        Abnormal            Final result                 Please view results for these tests on the individual orders.    Comprehensive Metabolic Panel [[id number]]  (Abnormal) Collected: 04/09/23 2247    Specimen: Blood Updated: 04/09/23 2349     Glucose 110 mg/dL      BUN 10 mg/dL      Creatinine 1.19 mg/dL      Sodium 137 mmol/L      Potassium 3.7 mmol/L      Chloride 100 mmol/L      CO2 23.4 mmol/L      Calcium 9.1 mg/dL      Total Protein 7.6 g/dL      Albumin 3.8 g/dL      ALT (SGPT) 23 U/L      AST (SGOT) 26 U/L      Alkaline Phosphatase 126 U/L      Total Bilirubin 0.4 mg/dL      Globulin 3.8 gm/dL      A/G Ratio 1.0 g/dL      BUN/Creatinine Ratio 8.4     Anion Gap 13.6 mmol/L      eGFR  82.7 mL/min/1.73     Narrative:      GFR Normal >60  Chronic Kidney Disease <60  Kidney Failure <15      Lipase [[id number]]  (Normal) Collected: 04/09/23 2247    Specimen: Blood Updated: 04/09/23 2349     Lipase 39 U/L     CBC Auto Differential [[id number]]  (Abnormal) Collected: 04/09/23 2247    Specimen: Blood Updated: 04/09/23 2320     WBC 18.63 10*3/mm3      RBC 6.63 10*6/mm3      Hemoglobin 17.9 g/dL      Hematocrit 52.8 %      MCV 79.6 fL      MCH 27.0 pg      MCHC 33.9 g/dL      RDW 13.9 %      RDW-SD 39.9 fl      MPV 9.8 fL      Platelets 340 10*3/mm3      Neutrophil % 76.4 %      Lymphocyte % 15.3 %      Monocyte % 4.8 %      Eosinophil % 2.5 %      Basophil % 0.5 %      Immature Grans % 0.5 %      Neutrophils, Absolute 14.22 10*3/mm3      Lymphocytes, Absolute 2.85 10*3/mm3      Monocytes, Absolute 0.90 10*3/mm3      Eosinophils, Absolute 0.47 10*3/mm3      Basophils, Absolute 0.10 10*3/mm3      Immature Grans, Absolute 0.09 10*3/mm3      nRBC 0.0 /100 WBC     Urinalysis With Microscopic If Indicated (No Culture) - Urine, Clean Catch [[id number]]  (Abnormal) Collected: 04/10/23 0308    Specimen: Urine, Clean Catch Updated: 04/10/23 0339     Color, UA Dark Yellow     Appearance, UA Cloudy     pH, UA 5.5     Specific Gravity, UA >=1.030     Glucose, UA Negative     Ketones, UA Trace     Bilirubin, UA Small (1+)     Blood, UA Trace     Protein, UA >=300 mg/dL (3+)     Leuk Esterase, UA Trace     Nitrite, UA Negative     Urobilinogen, UA 1.0 E.U./dL    Urinalysis, Microscopic Only - Urine, Clean Catch [[id number]]  (Abnormal) Collected: 04/10/23 0308    Specimen: Urine, Clean Catch Updated: 04/10/23 0343     RBC, UA 6-12 /HPF      WBC, UA 3-5 /HPF      Bacteria, UA 1+ /HPF      Squamous Epithelial Cells, UA 7-12 /HPF      Hyaline Casts, UA None Seen /LPF      Calcium Oxalate Crystals, UA Moderate/2+ /HPF      Mucus, UA Small/1+ /HPF      Methodology Automated Microscopy           Imaging:    CT Abdomen Pelvis  Without Contrast    Result Date: 4/10/2023  PROCEDURE: CT ABDOMEN PELVIS WO CONTRAST  COMPARISON: 4/1/2014.  INDICATIONS: Abdominal pain, nos; nausea/vomiting; diarrhea.  TECHNIQUE: 770 CT images were created without intravenous or oral contrast agent administration.   PROTOCOL:   Standard CT imaging protocol performed.    RADIATION:   Total DLP: 2,085.7 mGy*cm.   Automated exposure control was utilized to minimize radiation dose.  FINDINGS: No mechanical bowel obstruction.  The appendix is within normal limits, well seen on coronal image 94 of series 202 and adjacent images.  No acute colitis or colonic diverticulitis.  There are suspected jejunal diverticula, as well.  No definite acute jejunal diverticulitis is suggested.  There is new nonspecific age-indeterminate increased attenuation within the central mesenteric fat, especially in the upper-to-mid left abdomen, with congestion of the mesenteric vasculature and slight prominence of the mesenteric lymph nodes, such as seen on coronal images 53 through 103 (series 202).  Chronic mesenteritis or sclerosing mesenteritis would be among differential considerations.  Reactive changes related to an acute-to-subacute infectious/inflammatory enteritis cannot be excluded.  Minimal, if any, mural thickening is associated with the left upper quadrant small bowel.     No renal/ureteral stones or hydronephrosis is seen.  No obstructive uropathy is identified.  No ascites.  No aneurysmal dilatation of the aortoiliac arterial system.  No acute intraperitoneal or retroperitoneal hemorrhage.  No acute cholecystitis or pancreatitis.  No gallstones.  No biliary ductal dilatation.  There is new hepatomegaly with suspected diffuse hepatic steatosis.  The maximum craniocaudal dimension of the right lobe of the liver is nearly 23 cm.  Probably no splenomegaly.  No acute fracture.  No aggressive osseous lesion.  No acute infiltrate is seen in the partially imaged lung bases.  There may  be minimal subsegmental atelectasis in the lung bases.  There are coronary artery calcifications.  There may be mild diffuse prostatomegaly.  No urinary bladder calculi are seen.  There is a tiny fat-containing umbilical hernia.  It does not contain bowel.  It measures about 1.3 cm in greatest craniocaudal extent.  There may be a small right inguinal hernia, which contains fat and vessels and no bowel, and measures about 3.5 cm in greatest transverse diameter.         1. New nonspecific increased attenuation involves the central mesenteric fat, especially in the left abdomen, with mild-to-moderate prominence of the mesenteric lymph nodes in this region.  There is also associated mesenteric vascular congestion.  Minimal, if any, mural thickening of the adjacent small bowel is seen by nonenhanced CT.  The findings may be acute or chronic as discussed.  No mechanical bowel obstruction.  No pneumoperitoneum or pneumatosis.  No portal or mesenteric venous gas is seen to suggest ischemic enterocolitis.   2. There are jejunal and colonic diverticula without definite acute diverticulitis.   3. No focal extraluminal intraperitoneal or retroperitoneal fluid collections are seen to suggest abscess, ascites, or acute hemorrhage.   4. No acute appendicitis.   5. There is new diffuse hepatic steatosis with hepatomegaly.  Probably no splenomegaly.   6. No acute pancreatitis.  No gallstones or acute cholecystitis.   7. No renal stones.  No ureterolithiasis.  No obstructive uropathy or hydronephrosis.   8. No other acute findings are seen.   9. Coronary artery calcifications are noted; consider Cardiology consultation.   10. Please see above comments for further detail.    Please note that portions of this note were completed with a voice recognition program.  MACKENZIE HOOPER JR, MD       Electronically Signed and Approved By: MACKENZIE HOOPER JR, MD on 4/10/2023 at 0:39                  Differential Diagnosis and  Discussion:      Abdominal Pain: Based on the patient's signs and symptoms, I considered abdominal aortic aneurysm, small bowel obstruction, pancreatitis, acute cholecystitis, acute appendecitis, peptic ulcer disease, gastritis, colitis, endocrine disorders, irritable bowel syndrome and other differential diagnosis an etiology of the patient's abdominal pain.    All labs were reviewed and interpreted by me.  CT scan radiology interpretation was reviewed by me.    MDM       Patient Care Considerations:    CONSULT: I considered consulting Dr. Mendieta with cardiology regarding medication management, however patient declined staying for consult and wants to leave AMA      Consultants/Shared Management Plan:        Social Determinants of Health:    Patient is independent, reliable, and has access to care.       Disposition and Care Coordination:  0915 4/10/23 -Was assumed from RHODA Kim at beginning of shift 0700. In short pt presented with abdominal pain in LLQ with nausea/vomiting. Labs showed significant leukocytosis with WBC 18.63 neutrophil predominance, H&H 17.9, UA with trace ketones, small bili, trace blood. Lipase WNL.     CT shows new nonspecific increased attenuation of the central mesenteric fat especially in the left abdomen.  Mild to moderate prominence of mesenteric lymph nodes.  Also mesenteric vascular congestion.  Minimal mural thickening of the adjacent small bowel.  Acute or chronic.  No bowel obstruction, pneumoperitoneum, pneumatosis.  No portal mesenteric venous gas seen to suggest ischemic enterocolitis.  There is jejunal and colonic diverticula without acute diverticulitis.  No appendicitis.    Pt states all symptoms improved. Home BP meds given as well as labetolol x 2 for tachycardia and HTN. Discussed with supervising physician Dr. Faustin, recommendation to speak with Cardiology for med management recommendations given persistent tachycardia and HTN.     Chart was reveiwed and per last  cardiology note was getting referred for sleep study. Cassidy has put in rx for bentyl, zofran and miralax.     Pt does not want to stay for consult with cardiology. Wishes to sign AMA.     AMA: Patient has decided to leave our facility against medical advice. I have assessed the patient´s ability to make an informed decision and it is my opinion at this time that the patient has the medical decision-making capacity to comprehend information regarding current medical condition and appreciates the impact of the disease or condition and the consequences of various options for treatment, including foregoing treatment. The patient possesses the ability to evaluate all treatment options, compare the risks and benefits of each option, communicate choice in a consistent manner over time, and is able to make rational choices. I have explained to the patient the further testing, treatment, and evaluation I would like to perform during the current emergency department visit as well as any possible alternatives that could be accomplished in a timely manner. I have outlined the possible risks of forgoing any all of these interventions and the patient understands and acknowledges that the decision to leave may result in undesirable consequences such as death, permanent disability, and/or loss of current lifestyle. Even though leaving AMA is not ideal, I have instructed the patient to follow any discharge instructions given, take any medications prescribed, and resume care as soon as possible with any other provider. Additionally, we clearly stated that the patient is welcome to return it anytime to continue care at our facility.          Final diagnoses:   Generalized abdominal pain   Hypertension, unspecified type   Mesenteric adenitis   Left against medical advice        ED Disposition     ED Disposition   AMA    Condition   --    Comment   --             This medical record created using voice recognition software.            Trino Trejo PA-C  04/10/23 0923

## 2023-04-10 NOTE — DISCHARGE INSTRUCTIONS
Take all your home medications as prescribed.    Follow-up with your cardiologist and your PCP for management of your high blood pressure.    Take medications as prescribed for symptomatic treatment    return for new or worsening symptoms

## 2023-04-10 NOTE — ED PROVIDER NOTES
"Patient is 33 y.o. year old male that presents to the ED for evaluation of abdominal pain.  Patient was complaining of left lower quadrant pain and having multiple episodes of vomiting unable to keep his blood pressure medication down.  Patient takes 4+ blood pressure medications. Patient has no chest pain or trouble breathing.    Physical Exam  Cardiovascular:      Rate and Rhythm: Tachycardia present.   Pulmonary:      Effort: Pulmonary effort is normal.   Skin:     General: Skin is warm.   Neurological:      General: No focal deficit present.      Mental Status: He is alert.       Patient sitting up comfortably in the stretcher eating     ED Course:    BP (!) 214/148   Pulse 115   Temp 98 °F (36.7 °C) (Oral)   Resp 16   Ht 195.6 cm (77\")   Wt (!) 172 kg (379 lb 3.1 oz)   SpO2 94%   BMI 44.97 kg/m²   Results for orders placed or performed during the hospital encounter of 04/10/23   Comprehensive Metabolic Panel    Specimen: Blood   Result Value Ref Range    Glucose 110 (H) 65 - 99 mg/dL    BUN 10 6 - 20 mg/dL    Creatinine 1.19 0.76 - 1.27 mg/dL    Sodium 137 136 - 145 mmol/L    Potassium 3.7 3.5 - 5.2 mmol/L    Chloride 100 98 - 107 mmol/L    CO2 23.4 22.0 - 29.0 mmol/L    Calcium 9.1 8.6 - 10.5 mg/dL    Total Protein 7.6 6.0 - 8.5 g/dL    Albumin 3.8 3.5 - 5.2 g/dL    ALT (SGPT) 23 1 - 41 U/L    AST (SGOT) 26 1 - 40 U/L    Alkaline Phosphatase 126 (H) 39 - 117 U/L    Total Bilirubin 0.4 0.0 - 1.2 mg/dL    Globulin 3.8 gm/dL    A/G Ratio 1.0 g/dL    BUN/Creatinine Ratio 8.4 7.0 - 25.0    Anion Gap 13.6 5.0 - 15.0 mmol/L    eGFR 82.7 >60.0 mL/min/1.73   Lipase    Specimen: Blood   Result Value Ref Range    Lipase 39 13 - 60 U/L   Urinalysis With Microscopic If Indicated (No Culture) - Urine, Clean Catch    Specimen: Urine, Clean Catch   Result Value Ref Range    Color, UA Dark Yellow (A) Yellow, Straw    Appearance, UA Cloudy (A) Clear    pH, UA 5.5 5.0 - 8.0    Specific Gravity, UA >=1.030 1.005 - 1.030    " Glucose, UA Negative Negative    Ketones, UA Trace (A) Negative    Bilirubin, UA Small (1+) (A) Negative    Blood, UA Trace (A) Negative    Protein, UA >=300 mg/dL (3+) (A) Negative    Leuk Esterase, UA Trace (A) Negative    Nitrite, UA Negative Negative    Urobilinogen, UA 1.0 E.U./dL 0.2 - 1.0 E.U./dL   CBC Auto Differential    Specimen: Blood   Result Value Ref Range    WBC 18.63 (H) 3.40 - 10.80 10*3/mm3    RBC 6.63 (H) 4.14 - 5.80 10*6/mm3    Hemoglobin 17.9 (H) 13.0 - 17.7 g/dL    Hematocrit 52.8 (H) 37.5 - 51.0 %    MCV 79.6 79.0 - 97.0 fL    MCH 27.0 26.6 - 33.0 pg    MCHC 33.9 31.5 - 35.7 g/dL    RDW 13.9 12.3 - 15.4 %    RDW-SD 39.9 37.0 - 54.0 fl    MPV 9.8 6.0 - 12.0 fL    Platelets 340 140 - 450 10*3/mm3    Neutrophil % 76.4 (H) 42.7 - 76.0 %    Lymphocyte % 15.3 (L) 19.6 - 45.3 %    Monocyte % 4.8 (L) 5.0 - 12.0 %    Eosinophil % 2.5 0.3 - 6.2 %    Basophil % 0.5 0.0 - 1.5 %    Immature Grans % 0.5 0.0 - 0.5 %    Neutrophils, Absolute 14.22 (H) 1.70 - 7.00 10*3/mm3    Lymphocytes, Absolute 2.85 0.70 - 3.10 10*3/mm3    Monocytes, Absolute 0.90 0.10 - 0.90 10*3/mm3    Eosinophils, Absolute 0.47 (H) 0.00 - 0.40 10*3/mm3    Basophils, Absolute 0.10 0.00 - 0.20 10*3/mm3    Immature Grans, Absolute 0.09 (H) 0.00 - 0.05 10*3/mm3    nRBC 0.0 0.0 - 0.2 /100 WBC   Urinalysis, Microscopic Only - Urine, Clean Catch    Specimen: Urine, Clean Catch   Result Value Ref Range    RBC, UA 6-12 (A) None Seen /HPF    WBC, UA 3-5 (A) None Seen /HPF    Bacteria, UA 1+ (A) None Seen /HPF    Squamous Epithelial Cells, UA 7-12 (A) None Seen, 0-2 /HPF    Hyaline Casts, UA None Seen None Seen /LPF    Calcium Oxalate Crystals, UA Moderate/2+ None Seen /HPF    Mucus, UA Small/1+ (A) None Seen, Trace /HPF    Methodology Automated Microscopy    Green Top (Gel)   Result Value Ref Range    Extra Tube Hold for add-ons.    Lavender Top   Result Value Ref Range    Extra Tube hold for add-on    Gold Top - SST   Result Value Ref Range     Extra Tube Hold for add-ons.    Light Blue Top   Result Value Ref Range    Extra Tube Hold for add-ons.      Medications   sodium chloride 0.9 % flush 10 mL (has no administration in time range)   sodium chloride 0.9 % bolus 1,000 mL (1,000 mL Intravenous New Bag 4/10/23 0503)   ondansetron (ZOFRAN) injection 4 mg (4 mg Intravenous Given 4/10/23 0457)   ketorolac (TORADOL) injection 15 mg (15 mg Intravenous Given 4/10/23 0457)   amLODIPine (NORVASC) tablet 10 mg (10 mg Oral Given 4/10/23 0449)   losartan (COZAAR) tablet 100 mg (100 mg Oral Given 4/10/23 0449)   flecainide (TAMBOCOR) tablet 50 mg (50 mg Oral Given 4/10/23 0450)   magnesium citrate solution 296 mL (296 mL Oral Given 4/10/23 0451)   hydrALAZINE (APRESOLINE) injection 20 mg (20 mg Intravenous Given 4/10/23 0503)     CT Abdomen Pelvis Without Contrast    Result Date: 4/10/2023  Narrative: PROCEDURE: CT ABDOMEN PELVIS WO CONTRAST  COMPARISON: 4/1/2014.  INDICATIONS: Abdominal pain, nos; nausea/vomiting; diarrhea.  TECHNIQUE: 770 CT images were created without intravenous or oral contrast agent administration.   PROTOCOL:   Standard CT imaging protocol performed.    RADIATION:   Total DLP: 2,085.7 mGy*cm.   Automated exposure control was utilized to minimize radiation dose.  FINDINGS: No mechanical bowel obstruction.  The appendix is within normal limits, well seen on coronal image 94 of series 202 and adjacent images.  No acute colitis or colonic diverticulitis.  There are suspected jejunal diverticula, as well.  No definite acute jejunal diverticulitis is suggested.  There is new nonspecific age-indeterminate increased attenuation within the central mesenteric fat, especially in the upper-to-mid left abdomen, with congestion of the mesenteric vasculature and slight prominence of the mesenteric lymph nodes, such as seen on coronal images 53 through 103 (series 202).  Chronic mesenteritis or sclerosing mesenteritis would be among differential  considerations.  Reactive changes related to an acute-to-subacute infectious/inflammatory enteritis cannot be excluded.  Minimal, if any, mural thickening is associated with the left upper quadrant small bowel.     No renal/ureteral stones or hydronephrosis is seen.  No obstructive uropathy is identified.  No ascites.  No aneurysmal dilatation of the aortoiliac arterial system.  No acute intraperitoneal or retroperitoneal hemorrhage.  No acute cholecystitis or pancreatitis.  No gallstones.  No biliary ductal dilatation.  There is new hepatomegaly with suspected diffuse hepatic steatosis.  The maximum craniocaudal dimension of the right lobe of the liver is nearly 23 cm.  Probably no splenomegaly.  No acute fracture.  No aggressive osseous lesion.  No acute infiltrate is seen in the partially imaged lung bases.  There may be minimal subsegmental atelectasis in the lung bases.  There are coronary artery calcifications.  There may be mild diffuse prostatomegaly.  No urinary bladder calculi are seen.  There is a tiny fat-containing umbilical hernia.  It does not contain bowel.  It measures about 1.3 cm in greatest craniocaudal extent.  There may be a small right inguinal hernia, which contains fat and vessels and no bowel, and measures about 3.5 cm in greatest transverse diameter.      Impression:    1. New nonspecific increased attenuation involves the central mesenteric fat, especially in the left abdomen, with mild-to-moderate prominence of the mesenteric lymph nodes in this region.  There is also associated mesenteric vascular congestion.  Minimal, if any, mural thickening of the adjacent small bowel is seen by nonenhanced CT.  The findings may be acute or chronic as discussed.  No mechanical bowel obstruction.  No pneumoperitoneum or pneumatosis.  No portal or mesenteric venous gas is seen to suggest ischemic enterocolitis.   2. There are jejunal and colonic diverticula without definite acute diverticulitis.   3.  No focal extraluminal intraperitoneal or retroperitoneal fluid collections are seen to suggest abscess, ascites, or acute hemorrhage.   4. No acute appendicitis.   5. There is new diffuse hepatic steatosis with hepatomegaly.  Probably no splenomegaly.   6. No acute pancreatitis.  No gallstones or acute cholecystitis.   7. No renal stones.  No ureterolithiasis.  No obstructive uropathy or hydronephrosis.   8. No other acute findings are seen.   9. Coronary artery calcifications are noted; consider Cardiology consultation.   10. Please see above comments for further detail.    Please note that portions of this note were completed with a voice recognition program.  MACKENZIE HOOPER JR, MD       Electronically Signed and Approved By: MACKENZIE HOOPER JR, MD on 4/10/2023 at 0:39                MDM:    Patient presented with abdominal pain. CT overall unremarkable. He was noted to have HTN and tachycardia with no chest pain sob or other signs of HTN emergency. EKG normal sinus rhythm. He does have a history of persistent tachycardia as seen on his prior office visits. He is also on four blood pressure medications as well. Without chest pain or SOB low suspicion for PE. Patient sitting up eating stating he feels well and would like to go home.     Procedures      The case was discussed between the [facility] and myself. Patient  care including, but not limited to ordered imaging, medications, and lab results were reviewed. I then performed the substantive portion of the visit including all aspects of the medical decision making.        Eva Valdez MD  05:58 EDT  04/10/23       Eva Valdez MD  04/10/23 1056

## 2023-04-18 DIAGNOSIS — E03.9 HYPOTHYROIDISM, UNSPECIFIED TYPE: ICD-10-CM

## 2023-04-18 DIAGNOSIS — I10 ESSENTIAL HYPERTENSION: ICD-10-CM

## 2023-04-19 RX ORDER — AMLODIPINE BESYLATE 10 MG/1
TABLET ORAL
Qty: 30 TABLET | Refills: 0 | Status: SHIPPED | OUTPATIENT
Start: 2023-04-19 | End: 2023-04-20 | Stop reason: SDUPTHER

## 2023-04-19 RX ORDER — LOSARTAN POTASSIUM 100 MG/1
TABLET ORAL
Qty: 30 TABLET | Refills: 0 | Status: SHIPPED | OUTPATIENT
Start: 2023-04-19 | End: 2023-04-20 | Stop reason: SDUPTHER

## 2023-04-19 RX ORDER — LEVOTHYROXINE SODIUM 0.05 MG/1
TABLET ORAL
Qty: 30 TABLET | Refills: 0 | Status: SHIPPED | OUTPATIENT
Start: 2023-04-19 | End: 2023-04-20 | Stop reason: SDUPTHER

## 2023-04-20 ENCOUNTER — OFFICE VISIT (OUTPATIENT)
Dept: FAMILY MEDICINE CLINIC | Facility: CLINIC | Age: 33
End: 2023-04-20
Payer: COMMERCIAL

## 2023-04-20 VITALS
BODY MASS INDEX: 38.36 KG/M2 | TEMPERATURE: 97.3 F | WEIGHT: 315 LBS | HEIGHT: 76 IN | DIASTOLIC BLOOD PRESSURE: 82 MMHG | SYSTOLIC BLOOD PRESSURE: 134 MMHG | OXYGEN SATURATION: 97 % | HEART RATE: 117 BPM

## 2023-04-20 DIAGNOSIS — R60.9 PERIPHERAL EDEMA: ICD-10-CM

## 2023-04-20 DIAGNOSIS — M25.50 ARTHRALGIA, UNSPECIFIED JOINT: ICD-10-CM

## 2023-04-20 DIAGNOSIS — F25.9 SCHIZOAFFECTIVE DISORDER, UNSPECIFIED TYPE: ICD-10-CM

## 2023-04-20 DIAGNOSIS — I10 ESSENTIAL HYPERTENSION: Primary | ICD-10-CM

## 2023-04-20 DIAGNOSIS — E03.9 HYPOTHYROIDISM, UNSPECIFIED TYPE: ICD-10-CM

## 2023-04-20 DIAGNOSIS — F95.2 GILLES DE LA TOURETTE'S SYNDROME: ICD-10-CM

## 2023-04-20 DIAGNOSIS — E55.9 VITAMIN D DEFICIENCY: ICD-10-CM

## 2023-04-20 DIAGNOSIS — F84.5 ASPERGER'S SYNDROME: ICD-10-CM

## 2023-04-20 LAB
25(OH)D3 SERPL-MCNC: 23.7 NG/ML (ref 30–100)
ALBUMIN SERPL-MCNC: 4.3 G/DL (ref 3.5–5.2)
ALBUMIN/GLOB SERPL: 1.1 G/DL
ALP SERPL-CCNC: 114 U/L (ref 39–117)
ALT SERPL W P-5'-P-CCNC: 36 U/L (ref 1–41)
ANION GAP SERPL CALCULATED.3IONS-SCNC: 9.6 MMOL/L (ref 5–15)
AST SERPL-CCNC: 28 U/L (ref 1–40)
BACTERIA UR QL AUTO: ABNORMAL /HPF
BASOPHILS # BLD AUTO: 0.09 10*3/MM3 (ref 0–0.2)
BASOPHILS NFR BLD AUTO: 0.9 % (ref 0–1.5)
BILIRUB SERPL-MCNC: 0.4 MG/DL (ref 0–1.2)
BILIRUB UR QL STRIP: NEGATIVE
BUN SERPL-MCNC: 9 MG/DL (ref 6–20)
BUN/CREAT SERPL: 6 (ref 7–25)
CALCIUM SPEC-SCNC: 10.1 MG/DL (ref 8.6–10.5)
CHLORIDE SERPL-SCNC: 102 MMOL/L (ref 98–107)
CHOLEST SERPL-MCNC: 143 MG/DL (ref 0–200)
CLARITY UR: CLEAR
CO2 SERPL-SCNC: 26.4 MMOL/L (ref 22–29)
COD CRY URNS QL: ABNORMAL /HPF
COLOR UR: YELLOW
CREAT SERPL-MCNC: 1.49 MG/DL (ref 0.76–1.27)
DEPRECATED RDW RBC AUTO: 40.1 FL (ref 37–54)
EGFRCR SERPLBLD CKD-EPI 2021: 63.2 ML/MIN/1.73
EOSINOPHIL # BLD AUTO: 0.33 10*3/MM3 (ref 0–0.4)
EOSINOPHIL NFR BLD AUTO: 3.4 % (ref 0.3–6.2)
ERYTHROCYTE [DISTWIDTH] IN BLOOD BY AUTOMATED COUNT: 13.8 % (ref 12.3–15.4)
GLOBULIN UR ELPH-MCNC: 3.8 GM/DL
GLUCOSE SERPL-MCNC: 93 MG/DL (ref 65–99)
GLUCOSE UR STRIP-MCNC: NEGATIVE MG/DL
HCT VFR BLD AUTO: 49.6 % (ref 37.5–51)
HDLC SERPL-MCNC: 32 MG/DL (ref 40–60)
HGB BLD-MCNC: 16.6 G/DL (ref 13–17.7)
HGB UR QL STRIP.AUTO: NEGATIVE
HYALINE CASTS UR QL AUTO: ABNORMAL /LPF
IMM GRANULOCYTES # BLD AUTO: 0.04 10*3/MM3 (ref 0–0.05)
IMM GRANULOCYTES NFR BLD AUTO: 0.4 % (ref 0–0.5)
KETONES UR QL STRIP: ABNORMAL
LDLC SERPL CALC-MCNC: 93 MG/DL (ref 0–100)
LDLC/HDLC SERPL: 2.86 {RATIO}
LEUKOCYTE ESTERASE UR QL STRIP.AUTO: ABNORMAL
LYMPHOCYTES # BLD AUTO: 2.14 10*3/MM3 (ref 0.7–3.1)
LYMPHOCYTES NFR BLD AUTO: 21.8 % (ref 19.6–45.3)
MCH RBC QN AUTO: 27.3 PG (ref 26.6–33)
MCHC RBC AUTO-ENTMCNC: 33.5 G/DL (ref 31.5–35.7)
MCV RBC AUTO: 81.7 FL (ref 79–97)
MONOCYTES # BLD AUTO: 0.52 10*3/MM3 (ref 0.1–0.9)
MONOCYTES NFR BLD AUTO: 5.3 % (ref 5–12)
NEUTROPHILS NFR BLD AUTO: 6.68 10*3/MM3 (ref 1.7–7)
NEUTROPHILS NFR BLD AUTO: 68.2 % (ref 42.7–76)
NITRITE UR QL STRIP: NEGATIVE
NRBC BLD AUTO-RTO: 0 /100 WBC (ref 0–0.2)
PH UR STRIP.AUTO: 5.5 [PH] (ref 5–8)
PLATELET # BLD AUTO: 322 10*3/MM3 (ref 140–450)
PMV BLD AUTO: 9.9 FL (ref 6–12)
POTASSIUM SERPL-SCNC: 4 MMOL/L (ref 3.5–5.2)
PROT SERPL-MCNC: 8.1 G/DL (ref 6–8.5)
PROT UR QL STRIP: ABNORMAL
RBC # BLD AUTO: 6.07 10*6/MM3 (ref 4.14–5.8)
RBC # UR STRIP: ABNORMAL /HPF
REF LAB TEST METHOD: ABNORMAL
SODIUM SERPL-SCNC: 138 MMOL/L (ref 136–145)
SP GR UR STRIP: 1.02 (ref 1–1.03)
SQUAMOUS #/AREA URNS HPF: ABNORMAL /HPF
TRIGL SERPL-MCNC: 97 MG/DL (ref 0–150)
TSH SERPL DL<=0.05 MIU/L-ACNC: 1.78 UIU/ML (ref 0.27–4.2)
UROBILINOGEN UR QL STRIP: ABNORMAL
VLDLC SERPL-MCNC: 18 MG/DL (ref 5–40)
WBC # UR STRIP: ABNORMAL /HPF
WBC NRBC COR # BLD: 9.8 10*3/MM3 (ref 3.4–10.8)

## 2023-04-20 PROCEDURE — 81001 URINALYSIS AUTO W/SCOPE: CPT | Performed by: NURSE PRACTITIONER

## 2023-04-20 PROCEDURE — 84443 ASSAY THYROID STIM HORMONE: CPT | Performed by: NURSE PRACTITIONER

## 2023-04-20 PROCEDURE — 82306 VITAMIN D 25 HYDROXY: CPT | Performed by: NURSE PRACTITIONER

## 2023-04-20 PROCEDURE — 80061 LIPID PANEL: CPT | Performed by: NURSE PRACTITIONER

## 2023-04-20 PROCEDURE — 80053 COMPREHEN METABOLIC PANEL: CPT | Performed by: NURSE PRACTITIONER

## 2023-04-20 PROCEDURE — 85025 COMPLETE CBC W/AUTO DIFF WBC: CPT | Performed by: NURSE PRACTITIONER

## 2023-04-20 RX ORDER — LOSARTAN POTASSIUM 100 MG/1
100 TABLET ORAL DAILY
Qty: 90 TABLET | Refills: 1 | Status: SHIPPED | OUTPATIENT
Start: 2023-04-20

## 2023-04-20 RX ORDER — LEVOTHYROXINE SODIUM 0.05 MG/1
50 TABLET ORAL DAILY
Qty: 90 TABLET | Refills: 1 | Status: SHIPPED | OUTPATIENT
Start: 2023-04-20

## 2023-04-20 RX ORDER — AMLODIPINE BESYLATE 10 MG/1
10 TABLET ORAL DAILY
Qty: 90 TABLET | Refills: 1 | Status: SHIPPED | OUTPATIENT
Start: 2023-04-20

## 2023-04-20 RX ORDER — HYDRALAZINE HYDROCHLORIDE 50 MG/1
TABLET, FILM COATED ORAL
Qty: 90 TABLET | Refills: 1 | Status: SHIPPED | OUTPATIENT
Start: 2023-04-20

## 2023-04-20 RX ORDER — DICLOFENAC SODIUM 75 MG/1
75 TABLET, DELAYED RELEASE ORAL 2 TIMES DAILY
Qty: 180 TABLET | Refills: 1 | Status: SHIPPED | OUTPATIENT
Start: 2023-04-20

## 2023-04-20 RX ORDER — FLECAINIDE ACETATE 50 MG/1
50 TABLET ORAL 2 TIMES DAILY
Qty: 180 TABLET | Refills: 1 | Status: SHIPPED | OUTPATIENT
Start: 2023-04-20

## 2023-04-20 NOTE — PROGRESS NOTES
Chief Complaint  Mental Health Problem (Needs my heredia form filled out)    Subjective        Past Medical History:   Diagnosis Date   • Allergic rhinitis    • Anxiety    • Asperger's syndrome    • Colon polyps 2014   • Depression    • GERD (gastroesophageal reflux disease) 2015   • Hematuria, microscopic 2014    3/25//2014 large blood, > 300 protein u/a in office on repeat from outpt labs.   • Hypertension    • Microscopic hematuria 3/25/2014   • Resistant hypertension 2020   • Tourette's    • Tourette's disorder 2014     Social History     Tobacco Use   • Smoking status: Former     Packs/day: 0.05     Years: 5.00     Pack years: 0.25     Types: Cigarettes     Quit date:      Years since quittin.3   • Smokeless tobacco: Never   • Tobacco comments:     Stopped smoking at age 29; social smoker   Vaping Use   • Vaping Use: Never used   Substance Use Topics   • Alcohol use: Never   • Drug use: Not Currently      Current Outpatient Medications on File Prior to Visit   Medication Sig   • busPIRone (BUSPAR) 10 MG tablet Take 2 tablets by mouth 2 (Two) Times a Day.   • dicyclomine (BENTYL) 20 MG tablet Take 1 tablet by mouth Every 8 (Eight) Hours As Needed (abdominal pain).   • doxepin (SINEquan) 10 MG capsule    • mirtazapine (REMERON SOL-TAB) 45 MG disintegrating tablet Place 1 tablet on the tongue every night at bedtime.   • ondansetron ODT (ZOFRAN-ODT) 4 MG disintegrating tablet Place 1 tablet on the tongue Every 8 (Eight) Hours As Needed for Nausea or Vomiting.   • traZODone (DESYREL) 300 MG tablet Take 1 tablet by mouth Every Night.   • vitamin D (ERGOCALCIFEROL) 1.25 MG (86801 UT) capsule capsule TAKE 1 CAPSULE BY MOUTH EVERY 7 DAYS   • Vraylar 4.5 MG capsule Take 1 capsule by mouth Daily.   • [DISCONTINUED] amLODIPine (NORVASC) 10 MG tablet Take 1 tablet by mouth once daily   • [DISCONTINUED] diclofenac (VOLTAREN) 75 MG EC tablet Take 1 tablet by mouth 2 (Two) Times a  "Day.   • [DISCONTINUED] flecainide (TAMBOCOR) 50 MG tablet TAKE 1 TABLET(50 MG) BY MOUTH EVERY 12 HOURS   • [DISCONTINUED] hydrALAZINE (APRESOLINE) 50 MG tablet TAKE 2 TABLETS BY MOUTH EVERY MORNING AND 1 TABLET EVERY EVENING   • [DISCONTINUED] levothyroxine (SYNTHROID, LEVOTHROID) 50 MCG tablet Take 1 tablet by mouth once daily   • [DISCONTINUED] losartan (COZAAR) 100 MG tablet Take 1 tablet by mouth once daily   • [DISCONTINUED] amoxicillin (AMOXIL) 500 MG capsule Take 1 capsule by mouth 2 (Two) Times a Day. (Patient not taking: Reported on 4/20/2023)     No current facility-administered medications on file prior to visit.      Allergies   Allergen Reactions   • Shellfish Allergy Swelling   • Morphine Other (See Comments)     unk      Health Maintenance Due   Topic Date Due   • ANNUAL PHYSICAL  Never done   • COVID-19 Vaccine (3 - Booster for Moderna series) 01/16/2022      Anthony Tay presents to [facility]  History of Present Illness  Here to obtain refills of medications. Pt is fasting today. He is also requesting completion of Carin Sanchez.     HTN: normotensive in office. Does not monitor BP at home. Eats a fairly well balanced diet. Established with Cardiology.     Plans to transfer care from [facility] to RHODA Wekes.     Recent ER visits due to dehydration and constipation.     He is unable to prepare any foods, manage money/budgeting. Brother states he will get super focused on things that are necessary for survival. Pt states he can do his own laundry with direction and prompts. Pts mother is no longer around, have not seen her in years. He does not have any other family around that is able to help care for him.       Objective   Vital Signs:   /82   Pulse 117   Temp 97.3 °F (36.3 °C)   Ht 193 cm (76\")   Wt (!) 170 kg (374 lb)   SpO2 97%   BMI 45.52 kg/m²     Review of Systems   Respiratory: Negative for cough, shortness of breath and " wheezing.    Cardiovascular: Negative for chest pain, palpitations and leg swelling.   Neurological: Negative for dizziness, light-headedness and headache.      Physical Exam  Vitals reviewed.   Constitutional:       General: He is not in acute distress.     Appearance: Normal appearance. He is well-developed.   HENT:      Head: Normocephalic and atraumatic.      Right Ear: External ear normal.      Left Ear: External ear normal.   Eyes:      Conjunctiva/sclera: Conjunctivae normal.      Pupils: Pupils are equal, round, and reactive to light.   Cardiovascular:      Rate and Rhythm: Normal rate and regular rhythm.      Heart sounds: Normal heart sounds.   Pulmonary:      Effort: Pulmonary effort is normal.      Breath sounds: Normal breath sounds.   Musculoskeletal:      Cervical back: Neck supple.   Skin:     General: Skin is warm and dry.   Neurological:      Mental Status: He is alert. Mental status is at baseline.   Psychiatric:         Mood and Affect: Mood and affect normal.         Behavior: Behavior normal.        Result Review :   The following data was reviewed by: RHODA Lunsford on 04/20/2023:  Common labs        10/25/2022    08:27 4/9/2023    22:47   Common Labs   Glucose 98   110     BUN 9   10     Creatinine 1.36   1.19     Sodium 139   137     Potassium 4.3   3.7     Chloride 101   100     Calcium 9.5   9.1     Albumin 4.20   3.8     Total Bilirubin 0.5   0.4     Alkaline Phosphatase 119   126     AST (SGOT) 30   26     ALT (SGPT) 23   23     WBC 12.12   18.63     Hemoglobin 16.1   17.9     Hematocrit 47.9   52.8     Platelets 265   340     Total Cholesterol 133      Triglycerides 112      HDL Cholesterol 30      LDL Cholesterol  82        TSH        10/25/2022    08:27   TSH   TSH 2.640                 Assessment and Plan    Diagnoses and all orders for this visit:    1. Essential hypertension (Primary)  -     amLODIPine (NORVASC) 10 MG tablet; Take 1 tablet by mouth Daily.  Dispense: 90  tablet; Refill: 1  -     losartan (COZAAR) 100 MG tablet; Take 1 tablet by mouth Daily.  Dispense: 90 tablet; Refill: 1  -     CBC & Differential  -     Comprehensive Metabolic Panel  -     Lipid Panel  -     Urinalysis With Culture If Indicated - Urine, Clean Catch  -     flecainide (TAMBOCOR) 50 MG tablet; Take 1 tablet by mouth 2 (Two) Times a Day.  Dispense: 180 tablet; Refill: 1    2. Hypothyroidism, unspecified type  -     levothyroxine (SYNTHROID, LEVOTHROID) 50 MCG tablet; Take 1 tablet by mouth Daily.  Dispense: 90 tablet; Refill: 1  -     TSH Rfx On Abnormal To Free T4    3. Schizoaffective disorder, unspecified type  -     Ambulatory Referral to Behavioral Health    4. Asperger's syndrome  -     Ambulatory Referral to Behavioral Health    5. Arthralgia, unspecified joint  -     diclofenac (VOLTAREN) 75 MG EC tablet; Take 1 tablet by mouth 2 (Two) Times a Day.  Dispense: 180 tablet; Refill: 1    6. Peripheral edema  -     hydrALAZINE (APRESOLINE) 50 MG tablet; TAKE 2 TABLETS BY MOUTH EVERY MORNING AND 1 TABLET EVERY EVENING  Dispense: 90 tablet; Refill: 1    7. Nba de la Tourette's syndrome  -     Ambulatory Referral to Behavioral Health    8. Vitamin D deficiency  -     Vitamin D,25-Hydroxy        Follow Up   Return in about 6 months (around 10/20/2023) for Refills and fasting labs.  Patient was given instructions and counseling regarding his condition or for health maintenance advice. Please see specific information pulled into the AVS if appropriate.

## 2023-04-20 NOTE — PROGRESS NOTES
Venipuncture Blood Specimen Collection  Venipuncture performed in left arm  by Lydia Leblanc with good hemostasis. Patient tolerated the procedure well without complications.   04/20/23   Lydia Leblanc

## 2023-04-25 DIAGNOSIS — E55.9 VITAMIN D DEFICIENCY: ICD-10-CM

## 2023-04-25 RX ORDER — ERGOCALCIFEROL 1.25 MG/1
50000 CAPSULE ORAL
Qty: 12 CAPSULE | Refills: 0 | Status: SHIPPED | OUTPATIENT
Start: 2023-04-25

## 2023-06-16 ENCOUNTER — OFFICE VISIT (OUTPATIENT)
Dept: BEHAVIORAL HEALTH | Facility: CLINIC | Age: 33
End: 2023-06-16
Payer: COMMERCIAL

## 2023-06-16 ENCOUNTER — PRIOR AUTHORIZATION (OUTPATIENT)
Dept: PSYCHIATRY | Facility: CLINIC | Age: 33
End: 2023-06-16

## 2023-06-16 VITALS
DIASTOLIC BLOOD PRESSURE: 88 MMHG | HEIGHT: 77 IN | WEIGHT: 315 LBS | BODY MASS INDEX: 37.19 KG/M2 | SYSTOLIC BLOOD PRESSURE: 128 MMHG

## 2023-06-16 DIAGNOSIS — F41.1 GENERALIZED ANXIETY DISORDER: ICD-10-CM

## 2023-06-16 DIAGNOSIS — F51.5 NIGHTMARES: ICD-10-CM

## 2023-06-16 DIAGNOSIS — Z79.899 MEDICATION MANAGEMENT: ICD-10-CM

## 2023-06-16 DIAGNOSIS — F20.89 SCHIZOPHRENIA, SIMPLE, CHRONIC: Primary | ICD-10-CM

## 2023-06-16 DIAGNOSIS — F39 MOOD DISORDER: ICD-10-CM

## 2023-06-16 DIAGNOSIS — G47.00 INSOMNIA, UNSPECIFIED TYPE: ICD-10-CM

## 2023-06-16 RX ORDER — LUMATEPERONE 42 MG/1
42 CAPSULE ORAL DAILY
Qty: 30 CAPSULE | Refills: 0 | Status: SHIPPED | OUTPATIENT
Start: 2023-06-16 | End: 2023-07-16

## 2023-06-16 RX ORDER — BUSPIRONE HYDROCHLORIDE 10 MG/1
20 TABLET ORAL 2 TIMES DAILY
Qty: 120 TABLET | Refills: 0 | Status: SHIPPED | OUTPATIENT
Start: 2023-06-16 | End: 2023-07-16

## 2023-06-16 RX ORDER — GABAPENTIN 600 MG/1
TABLET ORAL
Qty: 15 TABLET | Refills: 0 | Status: SHIPPED | OUTPATIENT
Start: 2023-06-16

## 2023-06-16 NOTE — PROGRESS NOTES
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       Electronically Signed and Approved By: MACKENZIE HOOPER JR, MD on 4/10/2023 at 0:39                  Assessment & Plan   Diagnoses and all orders for this visit:    1. Schizophrenia, simple, chronic (Primary)  -     Lumateperone Tosylate (Caplyta) 42 MG capsule; Take 1 capsule by mouth Daily for 30 days.  Dispense: 30 capsule; Refill: 0  -     Cariprazine HCl (Vraylar) 1.5 MG capsule capsule; Take 2 capsules by mouth Daily for 3 days, THEN 1 capsule Daily for 3 days.  Dispense: 9 capsule; Refill: 0    2. Mood disorder    3. Generalized anxiety disorder  -     gabapentin (Neurontin) 600 MG tablet; Take 1 tab PO QHS for sleep  Dispense: 15 tablet; Refill: 0  -     busPIRone (BUSPAR) 10 MG tablet; Take 2 tablets by mouth 2 (Two) Times a Day for 30 days.  Dispense: 120 tablet; Refill: 0    4. Insomnia, unspecified type  -     gabapentin (Neurontin) 600 MG tablet; Take 1 tab PO QHS for sleep  Dispense: 15 tablet; Refill: 0    5. Nightmares  -     gabapentin (Neurontin) 600 MG tablet; Take 1 tab PO QHS for sleep  Dispense: 15 tablet; Refill: 0    6. Medication management  -     Urine Drug Screen - Urine, Clean Catch      Patient screened positive for depression based on a PHQ-9 score of 18 on 6/16/2023.  Follow-up recommendations include: Prescribed antidepressant medication treatment, Suicide Risk Assessment performed, and see assessment below .    Presentation seems to be most consistent with schizophrenia, mood disorder (schizophrenia versus MDD with psychotic features), RACHEL, insomnia, nightmares.  We will taper and discontinue Vraylar.  We will start on Caplyta for management of schizophrenia and overall mood.  I extensively discussed the need to be compliant with prescribed medications.  Patient plans on setting timers on his phone in order to remember taking the meds. Consider abilify for monthly injections if appropriate at future visit. We will continue BuSpar at this time and reassess at next office visit.  We will discontinue doxepin and start patient on gabapentin for management of anxiety, insomnia, nightmares.  Instructed patient to contact the office for any new or worsening symptoms or any other concerns.  Follow-up in 2 weeks.  Addressed all questions and concerns.    Visit Diagnoses:    ICD-10-CM ICD-9-CM   1. Schizophrenia, simple, chronic  F20.89 295.02   2. Mood disorder  F39 296.90   3. Generalized anxiety disorder  F41.1 300.02   4. Insomnia, unspecified type  G47.00 780.52   5. Nightmares  F51.5 307.47   6. Medication management  Z79.899 V58.69       PLAN:  Safety: No acute safety concerns at this time.  Therapy: Declines  Risk Assessment: Risk of self-harm acutely is moderate-severe.  Risk factors include anxiety disorder, mood disorder, family history, h/o suicide attempt in the past, and recent psychosocial stressors (pandemic). Protective factors include denies access to guns/weapons, no present SI, no history of self-harm in the past, minimal AODA, healthcare seeking, future orientation, willingness to engage in care.  Risk of self-harm chronically is also moderate-severe, but could be further elevated in the event of treatment noncompliance and/or AODA.  Labs/Diagnostics Ordered:    Orders Placed This Encounter   Procedures    Urine Drug Screen - Urine, Clean Catch     Medications:   New Medications Ordered This Visit   Medications    gabapentin (Neurontin) 600 MG tablet     Sig: Take 1 tab PO QHS for sleep     Dispense:  15 tablet     Refill:  0    Lumateperone Tosylate (Caplyta) 42 MG capsule     Sig: Take 1 capsule by mouth Daily for 30 days.     Dispense:  30 capsule     Refill:  0    busPIRone (BUSPAR) 10 MG tablet     Sig: Take 2 tablets by mouth 2 (Two) Times a Day for 30 days.     Dispense:  120 tablet     Refill:  0    Cariprazine HCl (Vraylar) 1.5 MG capsule capsule     Sig: Take 2 capsules by mouth Daily for 3 days, THEN 1 capsule Daily for 3 days.     Dispense:  9 capsule     Refill:  0       Discussed all risks, benefits, alternatives, and side effects of Gabapentin including but not limited to sedation, dizziness, GI upset, dry mouth, and weight gain. Pt instructed to avoid driving and doing other tasks or actions that require to be alert until knowing how the drug affects them.  Pt educated on the need to practice safe sex while taking this med. Discussed the need for pt to immediately call the office for any new or worsening symptoms, such as worsening depression; feeling nervous or restless; suicidal thoughts or actions; or other changes changes in mood or behavior, and all other concerns. Pt educated on med compliance and the risks of suddenly stopping this medication or missing doses. Pt verbalized understanding and is agreeable to taking Gabapentin. Addressed all questions and concerns.  Will order UDS and obtain CORRINE report. Pt verbally signed controlled substances agreement.    Caplyta, Risks, benefits, alternatives discussed with patient including nausea and vomiting, GI upset, sedation, akathisia, theoretical risk of tardive dyskinesia, and weight gain. Use care when operating vehicle, vessel, or machine. After discussion of these risks and benefits, the patient  voiced understanding and agreed to proceed.    Discussed all risks, benefits, alternatives, and side effects of Buspirone including but not limited to GI upset, dizziness, sedation, HA, nervousness, restlessness, and serotonin syndrome.  Pt educated on the need to practice safe sex while taking this med. Discussed the need for pt to immediately call the office for any new or worsening symptoms, and all other concerns. Pt educated on med compliance. Pt verbalized understanding and is agreeable to taking Buspirone. Addressed all questions and concerns.       Follow up:   F/u in 2 weeks.       TREATMENT PLAN/GOALS: Continue supportive psychotherapy efforts and medications as indicated. Treatment and medication options discussed during today's visit. Patient ackowledged and verbally consented to continue with current treatment plan and was educated on the importance of compliance with treatment and follow-up appointments.    MEDICATION ISSUES:  CORRINE reviewed as expected.  Discussed medication options and treatment plan of prescribed medication as well as the risks, benefits, and side effects including potential falls, possible impaired driving and metabolic adversities among others. Patient is agreeable to call the office with any worsening of symptoms or onset of side effects. Patient is agreeable to call 911 or go to the nearest ER should he/she begin having SI/HI. No medication side effects or related complaints today.            This document has been electronically signed by Melly Giang PA-C  June 16, 2023 09:54 EDT      Part of this note may be an electronic transcription/translation of spoken language to printed text using the Dragon Dictation System.

## 2023-06-16 NOTE — TELEPHONE ENCOUNTER
PA APPROVAL FOR CAPLYTA 42 MG CAPSULE RECEIVED.    APPROVAL DATES ARE 06/16/2023-08/15/2023    KEY IS:  [id number]    WAITING ON FAX RESPONSE FROM INSURANCE.

## 2023-07-30 DIAGNOSIS — F41.1 GENERALIZED ANXIETY DISORDER: ICD-10-CM

## 2023-07-31 RX ORDER — HYDROXYZINE HYDROCHLORIDE 25 MG/1
TABLET, FILM COATED ORAL
Qty: 60 TABLET | Refills: 0 | Status: SHIPPED | OUTPATIENT
Start: 2023-07-31 | End: 2023-08-04 | Stop reason: SDUPTHER

## 2023-08-04 ENCOUNTER — OFFICE VISIT (OUTPATIENT)
Dept: BEHAVIORAL HEALTH | Facility: CLINIC | Age: 33
End: 2023-08-04
Payer: COMMERCIAL

## 2023-08-04 VITALS
HEIGHT: 77 IN | SYSTOLIC BLOOD PRESSURE: 140 MMHG | DIASTOLIC BLOOD PRESSURE: 92 MMHG | WEIGHT: 315 LBS | BODY MASS INDEX: 37.19 KG/M2

## 2023-08-04 DIAGNOSIS — F41.1 GENERALIZED ANXIETY DISORDER: Primary | ICD-10-CM

## 2023-08-04 DIAGNOSIS — F51.5 NIGHTMARES: ICD-10-CM

## 2023-08-04 DIAGNOSIS — G47.00 INSOMNIA, UNSPECIFIED TYPE: ICD-10-CM

## 2023-08-04 DIAGNOSIS — F20.89 SCHIZOPHRENIA, SIMPLE, CHRONIC: ICD-10-CM

## 2023-08-04 RX ORDER — FLUOXETINE HYDROCHLORIDE 20 MG/1
20 CAPSULE ORAL EVERY MORNING
Qty: 90 CAPSULE | Refills: 0 | Status: SHIPPED | OUTPATIENT
Start: 2023-08-04 | End: 2023-11-02

## 2023-08-04 RX ORDER — LUMATEPERONE 42 MG/1
1 CAPSULE ORAL DAILY
COMMUNITY
Start: 2023-07-31 | End: 2023-08-04 | Stop reason: SDUPTHER

## 2023-08-04 RX ORDER — GABAPENTIN 600 MG/1
TABLET ORAL
Qty: 90 TABLET | Refills: 0 | Status: SHIPPED | OUTPATIENT
Start: 2023-08-04

## 2023-08-04 RX ORDER — HYDROXYZINE HYDROCHLORIDE 25 MG/1
TABLET, FILM COATED ORAL
Qty: 60 TABLET | Refills: 0 | Status: SHIPPED | OUTPATIENT
Start: 2023-08-04

## 2023-08-04 RX ORDER — LUMATEPERONE 42 MG/1
1 CAPSULE ORAL DAILY
Qty: 90 CAPSULE | Refills: 0 | Status: SHIPPED | OUTPATIENT
Start: 2023-08-04 | End: 2023-11-02

## 2023-08-14 ENCOUNTER — TELEMEDICINE (OUTPATIENT)
Dept: PSYCHIATRY | Facility: CLINIC | Age: 33
End: 2023-08-14
Payer: COMMERCIAL

## 2023-08-14 DIAGNOSIS — F33.9 RECURRENT MAJOR DEPRESSIVE DISORDER, REMISSION STATUS UNSPECIFIED: Primary | ICD-10-CM

## 2023-08-14 PROCEDURE — 90837 PSYTX W PT 60 MINUTES: CPT | Performed by: SOCIAL WORKER

## 2023-08-14 PROCEDURE — 1160F RVW MEDS BY RX/DR IN RCRD: CPT | Performed by: SOCIAL WORKER

## 2023-08-14 PROCEDURE — 1159F MED LIST DOCD IN RCRD: CPT | Performed by: SOCIAL WORKER
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
## 2023-08-14 NOTE — PROGRESS NOTES
Date: August 14, 2023  Time In: 12:59  Time Out: 1:52    Mode of visit: Video  Location of provider: [address], [place] [address]  Location of patient: home     Patient is being seen via telehealth (through [facility]) and patient confirms that they are in a secure environment for this session. The patient's condition being diagnosed/treated is appropriate for telemedicine. The provider identified herself as well as her credentials. The patient gave consent to be seen remotely, and when consent is given they understand that the consent allows for patient identifiable information to be sent to a third party as needed. They may refuse to be seen remotely at any time. The electronic data is encrypted and password protected, and the patient has been advised of the potential risks to privacy not withstanding such measures. Patient consented to the use of video for the purpose of a telehealth session today. The visit included audio and video interaction. No technical issues occurred during this visit.  PROGRESS NOTE  Data:  Anthony Tay is a 33 y.o. male who presents today for individual therapy session through [facility] with Madison Yeager LCSW.       Clinical Maneuvering/Intervention:  Assisted patient in processing  session content; acknowledged and normalized patient's thoughts, feelings, and concerns.  Rationalized patient thought process regarding mental health .  Discussed triggers associated with patient's mood and anger .  Also discussed coping skills for patient to implement such as recognizing options .    Allowed patient to freely discuss issues without interruption or judgment. Provided safe, confidential environment to facilitate the development of positive therapeutic relationship and encourage open, honest communication. Assisted patient in identifying risk factors which would indicate the need for higher level of care including  thoughts to harm self or others and/or self-harming behavior and encouraged patient to contact this office, call 911, or present to the nearest emergency room should any of these events occur. Discussed crisis intervention services and means to access. Patient adamantly and convincingly denies current suicidal or homicidal ideation or perceptual disturbance.  Therapist provided emotional support and education this date.   Discussed the therapist/patient relationship and explain the parameters and limitations of relative confidentiality.  Also discussed the importance active participation, and honesty to the treatment process.  Encouraged patient to utilize individual sessions to discuss/vent their feelings, frustrations, and fears.      Assessment   Patient appears to maintain relative stability .  However, patient continues to struggle with some depressive symptoms. He mostly recognizes some irritability and anger. He feels that this is contributed to by his living situation. He feels that his brother takes out some of his anger and frustration with his other family members on him  He feels often powerless to confront this situation as he is depending on his brother for his place to live. He has had to sacrifice financially, sell his belongings at times to help his brother and his family with their expenses. These stressors affect his mood,  and contribute  to impairment in important areas of functioning.  As a  result, they can be reasonably be expected to continue to benefit from treatment and would likely be at increased risk for decompensation otherwise.    Mental Status Exam:   Hygiene:   good  Cooperation:  Cooperative  Eye Contact:  Good  Psychomotor Behavior:  Appropriate  Affect:  Full range  Mood: normal  Speech:  Normal  Thought Process:  Goal directed  Thought Content:  Mood congruent  Suicidal:  None  Homicidal:  None  Hallucinations:  None  Delusion:  None  Memory:  Intact  Orientation:  Person,  Place, Time, and Situation  Reliability:  fair  Insight:  Fair  Judgement:  Fair  Impulse Control:  Fair  Physical/Medical Issues:  Yes    Tourette's, Asperger's , Hypothyroidism       Patient's Support Network Includes:  extended family and Friends     Functional Status: Mild impairment     Progress toward goal: Not at goal             Plan   Engage in activities and relationships that provide opportunities for support.  Recognize options in situations to feel a sense of control in life choices   Strengthen self esteem  Psychotherapy for symptom management.   Patient will continue in individual outpatient therapy with focus on improved functioning and coping skills, maintaining stability, and avoiding decompensation and the need for higher level of care.    Patient will adhere to medication regimen as prescribed and report any side effects. Patient will contact this office, call 911 or present to the nearest emergency room should suicidal or homicidal ideations occur. Provide Cognitive Behavioral Therapy and Solution Focused Therapy to improve functioning, maintain stability, and avoid decompensation and the need for higher level of care.     Return in about 2-3 weeks, or earlier if symptoms worsen or fail to improve.           VISIT DIAGNOSIS:     ICD-10-CM ICD-9-CM   1. Recurrent major depressive disorder, remission status unspecified  F33.9 296.30        This document has been electronically signed by Madison Yeager LCSW, August 14, 2023, 17:12 EDT    Part of this note may be an electronic transcription/translation of spoken language to printed text using the Dragon Dictation System.

## 2023-08-30 ENCOUNTER — TELEMEDICINE (OUTPATIENT)
Dept: PSYCHIATRY | Facility: CLINIC | Age: 33
End: 2023-08-30
Payer: COMMERCIAL

## 2023-08-30 DIAGNOSIS — F33.9 RECURRENT MAJOR DEPRESSIVE DISORDER, REMISSION STATUS UNSPECIFIED: Primary | ICD-10-CM

## 2023-08-30 DIAGNOSIS — F41.1 GENERALIZED ANXIETY DISORDER: ICD-10-CM

## 2023-08-30 NOTE — PSYCHOTHERAPY NOTE
Better, me and brother have not not been arguing,  He went back to work   Play games, private game   Money better that is good    Ran out supplies,   Stress  anger  better    Meds are working   Anxiety high - thunderstorm   Alopecia,  falls out when stressed    Think its going   Try just watch the update- turn the volume   Challenging negative thoughts   Grounding   Take hydroxozine, helps     TV not working   Talked to Tray,  doing great   Wnt to see each other   Bad experience  Bertha broke down   stuck for 2 days   24 and 28 yo step sons live sone

## 2023-08-30 NOTE — PROGRESS NOTES
Progress Note    Date: 08/30/2023  Time In: 09:02  Time Out: 09:29    Patient Legal Name: Anthony Tay  Patient Age: 33 y.o.    CHIEF COMPLAINT: Mood     Subjective   History of Present Illness     Anthony returns today for ongoing psychotherapy  :Our initial  therapy session was on 08/14/23. At that time goals included   Engage in activities and relationships that provide opportunities for support.  Recognize options in situations to feel a sense of control in life choices   Strengthen self esteem    Assessment    Mental Status Exam     Appearance: good hygiene and dressed appropriately for the weather  Behavior: calm  Cooperation:  engaged, cooperative, attentive, and friendly  Eye Contact:  good  Affect:  congruent  Mood: expressive  Speech: responsive  Thought Process:  linear  Thought Content: appropriate  Suicidal: denies  Homicidal:  denies  Hallucinations:  denies  Memory:  intact  Orientation:  person, place, time, and situation  Reliability:  reliable  Insight:  good  Judgment:  good     Clinical Intervention       ICD-10-CM ICD-9-CM   1. Recurrent major depressive disorder, remission status unspecified  F33.9 296.30   2. Generalized anxiety disorder  F41.1 300.02        Anthony is a 33 y.o. male who presents today as a follow-up for continued psychotherapy. Individual psychotherapy was provided utilizing solution focused  techniques to provide symptom relief, establish new coping skills, manage stress, identify triggers, acknowledge sources of feelings and behaviors, build confidence, challenge negative thinking patterns, and provide support. Anthony reports improved relationships in his home with his family  His brother recently returned to work after an injury and this has helped their relationship. He has been in touch with a friend in [place] and is connecting with him routinely for support. He reports improved depressive symptoms. He does report significant anxiety primarily when  storms are in the forecast. We spoke about some coping strategies to include challenging negative thoughts, minimizing stimulation, and grounding exercises. l    Plan   Plan & Goals     Challenging negative thoughts that increase anxiety  Utilize grounding techniques during anxious moments.     Patient acknowledged and verbally consented to continue working toward resolving current treatment plan goals and was educated on the importance of participation in the therapeutic process.  Patient will remain compliant with medication regimen as prescribed. Discuss any medication side effects, questions or concerns with prescribing provider.  Call 911 or present to the nearest emergency room in an emergency situation.  National Suicide Prevention Lifeline: Call 988. The Lifeline provides 24/7, free and confidential support for people in distress, prevention and crisis resources.  Crisis Text Line  Text HOME To 691875    Return in about 2 weeks (around 9/13/2023).    ____________________  This document has been electronically signed by Madison Yeager LCSW  August 30, 2023 09:32 EDT    Part of this note may be an electronic transcription/translation of spoken language to printed text using the Dragon Dictation System.

## 2023-09-06 ENCOUNTER — TELEMEDICINE (OUTPATIENT)
Dept: PSYCHIATRY | Facility: CLINIC | Age: 33
End: 2023-09-06
Payer: COMMERCIAL

## 2023-09-06 DIAGNOSIS — F41.1 GENERALIZED ANXIETY DISORDER: ICD-10-CM

## 2023-09-06 DIAGNOSIS — F51.5 NIGHTMARES: ICD-10-CM

## 2023-09-06 DIAGNOSIS — G47.00 INSOMNIA, UNSPECIFIED TYPE: ICD-10-CM

## 2023-09-06 DIAGNOSIS — F20.89 SCHIZOPHRENIA, SIMPLE, CHRONIC: Primary | ICD-10-CM

## 2023-09-06 RX ORDER — HYDROXYZINE HYDROCHLORIDE 25 MG/1
TABLET, FILM COATED ORAL
Qty: 60 TABLET | Refills: 0 | Status: SHIPPED | OUTPATIENT
Start: 2023-09-06

## 2023-09-06 RX ORDER — LUMATEPERONE 42 MG/1
1 CAPSULE ORAL DAILY
Qty: 90 CAPSULE | Refills: 0 | Status: SHIPPED | OUTPATIENT
Start: 2023-09-06 | End: 2023-12-05

## 2023-09-06 RX ORDER — GABAPENTIN 600 MG/1
TABLET ORAL
Qty: 90 TABLET | Refills: 0 | Status: SHIPPED | OUTPATIENT
Start: 2023-09-06

## 2023-09-06 RX ORDER — FLUOXETINE HYDROCHLORIDE 40 MG/1
40 CAPSULE ORAL EVERY MORNING
Qty: 30 CAPSULE | Refills: 1 | Status: SHIPPED | OUTPATIENT
Start: 2023-09-06 | End: 2023-10-06

## 2023-09-06 NOTE — PROGRESS NOTES
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 pancreatitis.  No gallstones or acute cholecystitis.   7. No renal stones.  No ureterolithiasis.  No obstructive uropathy or hydronephrosis.   8. No other acute findings are seen.   9. Coronary artery calcifications are noted; consider Cardiology consultation.   10. Please see above comments for further detail.    Please note that portions of this note were completed with a voice recognition program.  MACKENZIE HOOPER JR, MD       Electronically Signed and Approved By: MACKENZIE HOOPER JR, MD on 4/10/2023 at 0:39                  Assessment & Plan   Diagnoses and all orders for this visit:    1. Schizophrenia, simple, chronic (Primary)  -     Caplyta 42 MG capsule; Take 1 capsule by mouth Daily for 90 days.  Dispense: 90 capsule; Refill: 0  -     FLUoxetine (PROzac) 40 MG capsule; Take 1 capsule by mouth Every Morning for 30 days.  Dispense: 30 capsule; Refill: 1    2. Generalized anxiety disorder  -     gabapentin (Neurontin) 600 MG tablet; Take 1 tab PO QHS for sleep  Dispense: 90 tablet; Refill: 0  -     hydrOXYzine (ATARAX) 25 MG tablet; TAKE 1 TO 2 TABLETS BY MOUTH ONCE DAILY AS NEEDED SEVERE  ANXIETY  Dispense: 60 tablet; Refill: 0  -     FLUoxetine (PROzac) 40 MG capsule; Take 1 capsule by mouth Every Morning for 30 days.  Dispense: 30 capsule; Refill: 1    3. Nightmares  -     gabapentin  (Neurontin) 600 MG tablet; Take 1 tab PO QHS for sleep  Dispense: 90 tablet; Refill: 0    4. Insomnia, unspecified type  -     gabapentin (Neurontin) 600 MG tablet; Take 1 tab PO QHS for sleep  Dispense: 90 tablet; Refill: 0      Patient screened positive for depression based on a PHQ-9 score of 3 on 9/6/2023. Follow-up recommendations include: Prescribed antidepressant medication treatment, Suicide Risk Assessment performed, and see assessment below .    Presentation seems to be most consistent with schizophrenia, RACHEL, insomnia, nightmares.  Will continue Caplyta for management of schizophrenia and overall mood.  Will increase Prozac for management of anxiety and overall mood.  We will continue gabapentin for management of insomnia, nightmares, and overall mood.  We will continue hydroxyzine for anxiety as needed.  Follow-up in 1 month.  Continue therapy with Madison. Addressed all questions and concerns.    Visit Diagnoses:    ICD-10-CM ICD-9-CM   1. Schizophrenia, simple, chronic  F20.89 295.02   2. Generalized anxiety disorder  F41.1 300.02   3. Nightmares  F51.5 307.47   4. Insomnia, unspecified type  G47.00 780.52       PLAN:  Safety: No acute safety concerns at this time.  Therapy: Continue therapy with Madison.  Risk Assessment: Risk of self-harm acutely is moderate.  Risk factors include anxiety disorder, mood disorder, family history, h/o suicide attempt in the past, and recent psychosocial stressors (pandemic). Protective factors include denies access to guns/weapons, no present SI, no history of self-harm in the past, minimal AODA, healthcare seeking, future orientation, willingness to engage in care.  Risk of self-harm chronically is also moderate, but could be further elevated in the event of treatment noncompliance and/or AODA.  Labs/Diagnostics Ordered:   No orders of the defined types were placed in this encounter.    Medications:   New Medications Ordered This Visit   Medications    Caplyta 42 MG capsule      Sig: Take 1 capsule by mouth Daily for 90 days.     Dispense:  90 capsule     Refill:  0    gabapentin (Neurontin) 600 MG tablet     Sig: Take 1 tab PO QHS for sleep     Dispense:  90 tablet     Refill:  0    hydrOXYzine (ATARAX) 25 MG tablet     Sig: TAKE 1 TO 2 TABLETS BY MOUTH ONCE DAILY AS NEEDED SEVERE  ANXIETY     Dispense:  60 tablet     Refill:  0    FLUoxetine (PROzac) 40 MG capsule     Sig: Take 1 capsule by mouth Every Morning for 30 days.     Dispense:  30 capsule     Refill:  1       Discussed all risks, benefits, alternatives, and side effects of Gabapentin including but not limited to sedation, dizziness, GI upset, dry mouth, and weight gain. Pt instructed to avoid driving and doing other tasks or actions that require to be alert until knowing how the drug affects them.  Pt educated on the need to practice safe sex while taking this med. Discussed the need for pt to immediately call the office for any new or worsening symptoms, such as worsening depression; feeling nervous or restless; suicidal thoughts or actions; or other changes changes in mood or behavior, and all other concerns. Pt educated on med compliance and the risks of suddenly stopping this medication or missing doses. Pt verbalized understanding and is agreeable to taking Gabapentin. Addressed all questions and concerns.  Will order UDS and obtain CORRINE report. Pt verbally signed controlled substances agreement.    Caplyta, Risks, benefits, alternatives discussed with patient including nausea and vomiting, GI upset, sedation, akathisia, theoretical risk of tardive dyskinesia, and weight gain. Use care when operating vehicle, vessel, or machine. After discussion of these risks and benefits, the patient voiced understanding and agreed to proceed.    Discussed all risks, benefits, alternatives, and side effects of Fluoxetine including but not limited to GI upset, decreased appetite, sexual dysfunction, bleeding risk, seizure risk,  insomnia, anxiety, drowsiness, headache, asthenia, tremor, nervousness, activation of kory or hypomania, increased fragility fracture risk, hyponatremia, ocular effects, withdrawal syndrome following abrupt discontinuation, serotonin syndrome, hypersensitivity reaction, and activation of suicidal ideation and behavior.  Pt educated on the need to practice safe sex while taking this med. Discussed the need for pt to immediately call the office for any new or worsening symptoms, such as worsening depression; feeling nervous or restless; suicidal thoughts or actions; or other changes changes in mood or behavior, and all other concerns. Pt educated on med compliance and the risks of suddenly stopping this medication or missing doses. Pt verbalized understanding and is agreeable to taking Fluoxetine. Addressed all questions and concerns.     Discussed all risks, benefits, alternatives, and side effects of Hydroxyzine including but not limited to dry mouth, sedation, tremor, respiratory depression, acute generalized exanthematous pustulosis, CNS depression, drowsiness, cognitive dysfunction, dizziness, falls risk, prolonged QT interval, torsades de pointes, hallucination, involuntary body movements, seizure, and headache. Pt denies known h/o prolonged QT interval. Pt educated on the need to practice safe sex while taking this med. Discussed the need for pt to immediately call the office for any new or worsening symptoms, such as changes changes in mood or behavior, and all other concerns. Pt verbalized understanding and is agreeable to taking Hydroxyzine. Addressed all questions and concerns.       Follow up:   F/u in 1 month      TREATMENT PLAN/GOALS: Continue supportive psychotherapy efforts and medications as indicated. Treatment and medication options discussed during today's visit. Patient ackowledged and verbally consented to continue with current treatment plan and was educated on the importance of compliance with  treatment and follow-up appointments.    MEDICATION ISSUES:  CORRINE reviewed as expected.  Discussed medication options and treatment plan of prescribed medication as well as the risks, benefits, and side effects including potential falls, possible impaired driving and metabolic adversities among others. Patient is agreeable to call the office with any worsening of symptoms or onset of side effects. Patient is agreeable to call 911 or go to the nearest ER should he/she begin having SI/HI. No medication side effects or related complaints today.            This document has been electronically signed by Melly Giang PA-C  September 6, 2023 09:56 EDT      Part of this note may be an electronic transcription/translation of spoken language to printed text using the Dragon Dictation System.

## 2023-09-12 NOTE — PROGRESS NOTES
Progress Note    Date: 09/13/2023  Time In: 9:01  Time Out: 09:21    Patient Legal Name: Stacey Tay  Patient Age: 33 y.o.    CHIEF COMPLAINT: mood     Subjective   History of Present Illness     stacey returns today for ongoing psychotherapy our most recent therapy session was on 08/30.23   Our goals at that time were to Challenging negative thoughts that increase anxiety  Utilize grounding techniques during anxious momen    Assessment    Mental Status Exam     Appearance: good hygiene and dressed appropriately for the weather  Behavior: calm  Cooperation:  engaged, cooperative, attentive, and friendly  Eye Contact:  good  Affect:  blunted  Mood: expressive  Speech: responsive  Thought Process:  organized  Thought Content: appropriate  Suicidal: denies  Homicidal:  denies  Hallucinations:  denies  Memory:  intact  Orientation:  person, place, time, and situation  Reliability:  reliable  Insight:  good  Judgment:  good     Clinical Intervention       ICD-10-CM ICD-9-CM   1. Recurrent major depressive disorder, remission status unspecified  F33.9 296.30   2. RACHEL (generalized anxiety disorder)  F41.1 300.02          Stacey is a 33 y.o. male who presents today as a follow-up for continued psychotherapy. Individual psychotherapy was provided utilizing solution focused  techniques to provide symptom relief, encourage expression of thoughts and feelings, support self-esteem, promote emotion regulation, manage stress, identify triggers, acknowledge sources of feelings and behaviors, identify strengths, build confidence, provide support, and discuss interpersonal conflicts.  Stacey reports anxiety in the areas of concerns when it storms and also some interpersonal conflicts. He is able to provide me with multiple coping strategies that he reports are effective in these situations. He is compliant with medication, has reminders in multiple places to ensure compliance. He continues to enjoy osmel and  television.  His goal at this time is to maintain progress made with treatment and self awareness.     Plan   Plan & Goals     Maintain progress made with treatment and self awareness.   Recognize factors that have led to progress  Psychotherapy for symptom management     Patient acknowledged and verbally consented to continue working toward resolving current treatment plan goals and was educated on the importance of participation in the therapeutic process.  Patient will remain compliant with medication regimen as prescribed. Discuss any medication side effects, questions or concerns with prescribing provider.  Call 911 or present to the nearest emergency room in an emergency situation.  National Suicide Prevention Lifeline: Call 988. The Lifeline provides 24/7, free and confidential support for people in distress, prevention and crisis resources.  Crisis Text Line  Text HOME To 166255    Return in about 3 weeks (around 10/4/2023).    ____________________  This document has been electronically signed by Madison Yeager LCSW  September 13, 2023 09:30 EDT    Part of this note may be an electronic transcription/translation of spoken language to printed text using the Dragon Dictation System.

## 2023-09-13 ENCOUNTER — TELEMEDICINE (OUTPATIENT)
Dept: PSYCHIATRY | Facility: CLINIC | Age: 33
End: 2023-09-13
Payer: COMMERCIAL

## 2023-09-13 DIAGNOSIS — F41.1 GAD (GENERALIZED ANXIETY DISORDER): ICD-10-CM

## 2023-09-13 DIAGNOSIS — F33.9 RECURRENT MAJOR DEPRESSIVE DISORDER, REMISSION STATUS UNSPECIFIED: Primary | ICD-10-CM

## 2023-09-13 NOTE — PSYCHOTHERAPY NOTE
Melly last week, kept everything same   Some depression- situational   stress or conflict   There way or the highway - Brother and sister in law  Watch a movie together -    TV- sister in law [facility] gave me a TV  Bigger   Anger issues,  Count to 10, walk away from the situation     Starting to grow back hair,   falls out  in different spots   Hydrozazine, when storms- 30 minutes    Trouble remembering - message on fridge- and alarms   Talking to Tray, over games,  he is better     Ana Paula girl song last night while we played a game ,  watched the  Ana Paula movie, liked it   Recognize improvement and his contribution to [place]      Sef care

## 2023-10-11 ENCOUNTER — TELEMEDICINE (OUTPATIENT)
Dept: PSYCHIATRY | Facility: CLINIC | Age: 33
End: 2023-10-11
Payer: COMMERCIAL

## 2023-10-11 DIAGNOSIS — F20.89 SCHIZOPHRENIA, SIMPLE, CHRONIC: Primary | ICD-10-CM

## 2023-10-11 DIAGNOSIS — G47.00 INSOMNIA, UNSPECIFIED TYPE: ICD-10-CM

## 2023-10-11 DIAGNOSIS — F51.5 NIGHTMARES: ICD-10-CM

## 2023-10-11 DIAGNOSIS — F41.1 GENERALIZED ANXIETY DISORDER: ICD-10-CM

## 2023-10-11 RX ORDER — FLUOXETINE HYDROCHLORIDE 20 MG/1
20 CAPSULE ORAL EVERY MORNING
Qty: 90 CAPSULE | Refills: 0 | Status: SHIPPED | OUTPATIENT
Start: 2023-10-11 | End: 2024-01-09

## 2023-10-11 RX ORDER — GABAPENTIN 600 MG/1
TABLET ORAL
Qty: 90 TABLET | Refills: 0 | Status: SHIPPED | OUTPATIENT
Start: 2023-10-11

## 2023-10-11 RX ORDER — HYDROXYZINE HYDROCHLORIDE 25 MG/1
TABLET, FILM COATED ORAL
Qty: 60 TABLET | Refills: 0 | Status: SHIPPED | OUTPATIENT
Start: 2023-10-11

## 2023-10-11 RX ORDER — LUMATEPERONE 42 MG/1
1 CAPSULE ORAL DAILY
Qty: 90 CAPSULE | Refills: 0 | Status: SHIPPED | OUTPATIENT
Start: 2023-10-11 | End: 2024-01-09

## 2023-10-11 RX ORDER — FLUOXETINE HYDROCHLORIDE 40 MG/1
40 CAPSULE ORAL EVERY MORNING
Qty: 90 CAPSULE | Refills: 0 | Status: SHIPPED | OUTPATIENT
Start: 2023-10-11 | End: 2024-01-09

## 2023-10-11 NOTE — PROGRESS NOTES
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 mesenteric lymph nodes in this region.  There is also associated mesenteric vascular congestion.  Minimal, if any, mural thickening of the adjacent small bowel is seen by nonenhanced CT.  The findings may be acute or chronic as discussed.  No mechanical bowel obstruction.  No pneumoperitoneum or pneumatosis.  No portal or mesenteric venous gas is seen to suggest ischemic enterocolitis.   2. There are jejunal and colonic diverticula without definite acute diverticulitis.   3. No focal extraluminal intraperitoneal or retroperitoneal fluid collections are seen to suggest abscess, ascites, or acute hemorrhage.   4. No acute appendicitis.   5. There is new diffuse hepatic steatosis with hepatomegaly.  Probably no splenomegaly.   6. No acute pancreatitis.  No gallstones or acute cholecystitis.   7. No renal stones.  No ureterolithiasis.  No obstructive uropathy or hydronephrosis.   8. No other acute findings are seen.   9. Coronary artery calcifications are noted; consider Cardiology consultation.   10. Please see above comments for further detail.    Please note that portions of this note were completed with a voice recognition program.  MACKENZIE HOOPER JR, MD       Electronically Signed and Approved By: MACKENZEI HOOPER JR, MD on 4/10/2023 at 0:39                  Assessment & Plan   Diagnoses and all orders for this visit:    1.  Schizophrenia, simple, chronic (Primary)  -     Caplyta 42 MG capsule; Take 1 capsule by mouth Daily for 90 days.  Dispense: 90 capsule; Refill: 0  -     FLUoxetine (PROzac) 40 MG capsule; Take 1 capsule by mouth Every Morning for 90 days. Take with 20mg cap for total dose of 60mg  Dispense: 90 capsule; Refill: 0  -     FLUoxetine (PROzac) 20 MG capsule; Take 1 capsule by mouth Every Morning for 90 days. Take with 40mg cap for total dose of 60mg  Dispense: 90 capsule; Refill: 0    2. Generalized anxiety disorder  -     FLUoxetine (PROzac) 40 MG capsule; Take 1 capsule by mouth Every Morning for 90 days. Take with 20mg cap for total dose of 60mg  Dispense: 90 capsule; Refill: 0  -     FLUoxetine (PROzac) 20 MG capsule; Take 1 capsule by mouth Every Morning for 90 days. Take with 40mg cap for total dose of 60mg  Dispense: 90 capsule; Refill: 0  -     gabapentin (Neurontin) 600 MG tablet; Take 1 tab PO QHS for sleep  Dispense: 90 tablet; Refill: 0  -     hydrOXYzine (ATARAX) 25 MG tablet; TAKE 1 TO 2 TABLETS BY MOUTH ONCE DAILY AS NEEDED SEVERE  ANXIETY  Dispense: 60 tablet; Refill: 0    3. Nightmares  -     gabapentin (Neurontin) 600 MG tablet; Take 1 tab PO QHS for sleep  Dispense: 90 tablet; Refill: 0    4. Insomnia, unspecified type  -     gabapentin (Neurontin) 600 MG tablet; Take 1 tab PO QHS for sleep  Dispense: 90 tablet; Refill: 0        Patient screened positive for depression based on a PHQ-9 score of 10 on 10/11/2023. Follow-up recommendations include: Prescribed antidepressant medication treatment, Suicide Risk Assessment performed, and see assessment below .    Presentation seems to be most consistent with schizophrenia, RACHEL, insomnia, nightmares.  Will continue Caplyta for management of schizophrenia and overall mood.  Will increase Prozac for management of anxiety and overall mood.  We will continue gabapentin for management of insomnia, nightmares, and overall mood.  We will continue hydroxyzine for  anxiety as needed.  Follow-up in 1 month.  Continue therapy with Madison. Addressed all questions and concerns.    Visit Diagnoses:    ICD-10-CM ICD-9-CM   1. Schizophrenia, simple, chronic  F20.89 295.02   2. Generalized anxiety disorder  F41.1 300.02   3. Nightmares  F51.5 307.47   4. Insomnia, unspecified type  G47.00 780.52         PLAN:  Safety: No acute safety concerns at this time.  Therapy: Continue therapy with Madison.  Risk Assessment: Risk of self-harm acutely is moderate.  Risk factors include anxiety disorder, mood disorder, family history, h/o suicide attempt in the past, and recent psychosocial stressors (pandemic). Protective factors include denies access to guns/weapons, no present SI, no history of self-harm in the past, minimal AODA, healthcare seeking, future orientation, willingness to engage in care.  Risk of self-harm chronically is also moderate, but could be further elevated in the event of treatment noncompliance and/or AODA.  Labs/Diagnostics Ordered:   No orders of the defined types were placed in this encounter.    Medications:   New Medications Ordered This Visit   Medications    Caplyta 42 MG capsule     Sig: Take 1 capsule by mouth Daily for 90 days.     Dispense:  90 capsule     Refill:  0    FLUoxetine (PROzac) 40 MG capsule     Sig: Take 1 capsule by mouth Every Morning for 90 days. Take with 20mg cap for total dose of 60mg     Dispense:  90 capsule     Refill:  0    FLUoxetine (PROzac) 20 MG capsule     Sig: Take 1 capsule by mouth Every Morning for 90 days. Take with 40mg cap for total dose of 60mg     Dispense:  90 capsule     Refill:  0    gabapentin (Neurontin) 600 MG tablet     Sig: Take 1 tab PO QHS for sleep     Dispense:  90 tablet     Refill:  0    hydrOXYzine (ATARAX) 25 MG tablet     Sig: TAKE 1 TO 2 TABLETS BY MOUTH ONCE DAILY AS NEEDED SEVERE  ANXIETY     Dispense:  60 tablet     Refill:  0       Discussed all risks, benefits, alternatives, and side effects of Gabapentin  including but not limited to sedation, dizziness, GI upset, dry mouth, and weight gain. Pt instructed to avoid driving and doing other tasks or actions that require to be alert until knowing how the drug affects them.  Pt educated on the need to practice safe sex while taking this med. Discussed the need for pt to immediately call the office for any new or worsening symptoms, such as worsening depression; feeling nervous or restless; suicidal thoughts or actions; or other changes changes in mood or behavior, and all other concerns. Pt educated on med compliance and the risks of suddenly stopping this medication or missing doses. Pt verbalized understanding and is agreeable to taking Gabapentin. Addressed all questions and concerns.  Will order UDS and obtain CORRINE report. Pt verbally signed controlled substances agreement.    Caplyta, Risks, benefits, alternatives discussed with patient including nausea and vomiting, GI upset, sedation, akathisia, theoretical risk of tardive dyskinesia, and weight gain. Use care when operating vehicle, vessel, or machine. After discussion of these risks and benefits, the patient voiced understanding and agreed to proceed.    Discussed all risks, benefits, alternatives, and side effects of Fluoxetine including but not limited to GI upset, decreased appetite, sexual dysfunction, bleeding risk, seizure risk, insomnia, anxiety, drowsiness, headache, asthenia, tremor, nervousness, activation of kory or hypomania, increased fragility fracture risk, hyponatremia, ocular effects, withdrawal syndrome following abrupt discontinuation, serotonin syndrome, hypersensitivity reaction, and activation of suicidal ideation and behavior.  Pt educated on the need to practice safe sex while taking this med. Discussed the need for pt to immediately call the office for any new or worsening symptoms, such as worsening depression; feeling nervous or restless; suicidal thoughts or actions; or other  changes changes in mood or behavior, and all other concerns. Pt educated on med compliance and the risks of suddenly stopping this medication or missing doses. Pt verbalized understanding and is agreeable to taking Fluoxetine. Addressed all questions and concerns.     Discussed all risks, benefits, alternatives, and side effects of Hydroxyzine including but not limited to dry mouth, sedation, tremor, respiratory depression, acute generalized exanthematous pustulosis, CNS depression, drowsiness, cognitive dysfunction, dizziness, falls risk, prolonged QT interval, torsades de pointes, hallucination, involuntary body movements, seizure, and headache. Pt denies known h/o prolonged QT interval. Pt educated on the need to practice safe sex while taking this med. Discussed the need for pt to immediately call the office for any new or worsening symptoms, such as changes changes in mood or behavior, and all other concerns. Pt verbalized understanding and is agreeable to taking Hydroxyzine. Addressed all questions and concerns.       Follow up:   F/u in 1 month      TREATMENT PLAN/GOALS: Continue supportive psychotherapy efforts and medications as indicated. Treatment and medication options discussed during today's visit. Patient ackowledged and verbally consented to continue with current treatment plan and was educated on the importance of compliance with treatment and follow-up appointments.    MEDICATION ISSUES:  CORRINE reviewed as expected.  Discussed medication options and treatment plan of prescribed medication as well as the risks, benefits, and side effects including potential falls, possible impaired driving and metabolic adversities among others. Patient is agreeable to call the office with any worsening of symptoms or onset of side effects. Patient is agreeable to call 911 or go to the nearest ER should he/she begin having SI/HI. No medication side effects or related complaints today.            This document has  been electronically signed by Melly Giang PA-C  October 11, 2023 11:10 EDT      Part of this note may be an electronic transcription/translation of spoken language to printed text using the Dragon Dictation System.

## 2023-10-18 NOTE — PROGRESS NOTES
Progress Note    Date: 10/19/2023  Time In: 09:00am   Time Out: 09:19 am     Patient Legal Name: Anthony Tay  Patient Age: 33 y.o.    CHIEF COMPLAINT: depression     Subjective   History of Present Illness     Mode of visit: Video  Location of provider: [address], [place] [address]  Location of patient: Home     Patient is being seen via [facility]health ([facility]) and patient confirms that they are in a secure environment for this session. The patient's condition being diagnosed/treated is appropriate for telemedicine. The provider identified herself as well as her credentials. The patient gave consent to be seen remotely, and when consent is given they understand that the consent allows for patient identifiable information to be sent to a third party as needed. They may refuse to be seen remotely at any time. The electronic data is encrypted and password protected, and the patient has been advised of the potential risks to privacy not withstanding such measures. Patient consented to the use of video for the purpose of a telehealth session today. The visit included audio and video interaction. No technical issues occurred during this visit.  Anthony was last seen by this provider for ongoing psychotherapy on 09/13/23 Maintain progress made with treatment and self awareness.   Recognize factors that have led to progress  Psychotherapy for symptom management     Assessment    Mental Status Exam     Appearance: good hygiene and dressed appropriately for the weather  Behavior: calm  Cooperation:  engaged, cooperative, attentive, and friendly  Eye Contact:  good  Affect:  congruent  Mood: expressive  Speech: responsive  Thought Process:  organized  Thought Content: appropriate  Suicidal: denies  Homicidal:  denies  Hallucinations:  denies  Memory:  intact  Orientation:  person, place, time, and situation  Reliability:  reliable  Insight:  good  Judgment:  good     Clinical  Intervention       ICD-10-CM ICD-9-CM   1. Episode of recurrent major depressive disorder, unspecified depression episode severity  F33.9 296.30   2. RACHEL (generalized anxiety disorder)  F41.1 300.02          ICD-10-CM ICD-9-CM   1. Recurrent major depressive disorder, remission status unspecified  F33.9 296.30   2. RACHEL (generalized anxiety disorder)  F41.1 300.02     Anthony is a 33 y.o. male who presents today as a follow-up for continued psychotherapy. Individual psychotherapy was provided utilizing solution focused  techniques to provide symptom relief, support self-esteem, discuss values and strengths, manage stress, identify triggers, recognize patterns of behavior, acknowledge sources of feelings and behaviors, assess symptoms, and provide support. Anthony reports that he is doing well. He feels stable on his medication.  He recognizes that medication and coping strategies have helped him improve.  Discussed importance of recognizing and maintaining gains with treatment.  We also discussed symptoms that he might notice if he weren't doing well.  He identified symptoms of anger and sadness as an indicator that he would need to evaluate further and discuss with providers   Plan   Plan & Goals     Recognize and maintain progress   Psychotherapy for symptom relief     Patient acknowledged and verbally consented to continue working toward resolving current treatment plan goals and was educated on the importance of participation in the therapeutic process.  Patient will remain compliant with medication regimen as prescribed. Discuss any medication side effects, questions or concerns with prescribing provider.  Call 911 or present to the nearest emergency room in an emergency situation.  National Suicide Prevention Lifeline: Call 988. The Lifeline provides 24/7, free and confidential support for people in distress, prevention and crisis resources.  Crisis Text Line  Text HOME To 565369    Return in about 3  weeks (around 11/9/2023).    ____________________  This document has been electronically signed by Madison Yeager LCSW  October 19, 2023 09:46 EDT    Part of this note may be an electronic transcription/translation of spoken language to printed text using the Dragon Dictation System.

## 2023-10-19 ENCOUNTER — TELEMEDICINE (OUTPATIENT)
Dept: PSYCHIATRY | Facility: CLINIC | Age: 33
End: 2023-10-19
Payer: COMMERCIAL

## 2023-10-19 DIAGNOSIS — F41.1 GAD (GENERALIZED ANXIETY DISORDER): ICD-10-CM

## 2023-10-19 DIAGNOSIS — F33.9 EPISODE OF RECURRENT MAJOR DEPRESSIVE DISORDER, UNSPECIFIED DEPRESSION EPISODE SEVERITY: Primary | ICD-10-CM

## 2023-10-19 NOTE — PSYCHOTHERAPY NOTE
Stable   Mood good  -   Part meds and part coping   Getting along well with brother   Sometimes she thinks knows everything- come to my place   Hair grew back - sign that I am better     Reminder set on phone to take     Steffen-  ashley name Tray -     Have a good week   Maintain gains that have   Anger and sadness would be a symptom no notice if I was not doing well

## 2023-11-13 ENCOUNTER — LAB (OUTPATIENT)
Dept: LAB | Facility: HOSPITAL | Age: 33
End: 2023-11-13
Payer: COMMERCIAL

## 2023-11-13 LAB
AMPHET+METHAMPHET UR QL: NEGATIVE
BARBITURATES UR QL SCN: NEGATIVE
BENZODIAZ UR QL SCN: NEGATIVE
CANNABINOIDS SERPL QL: NEGATIVE
COCAINE UR QL: NEGATIVE
FENTANYL UR-MCNC: NEGATIVE NG/ML
METHADONE UR QL SCN: NEGATIVE
OPIATES UR QL: NEGATIVE
OXYCODONE UR QL SCN: NEGATIVE

## 2023-11-13 PROCEDURE — 80307 DRUG TEST PRSMV CHEM ANLYZR: CPT | Performed by: PHYSICIAN ASSISTANT

## 2023-11-14 DIAGNOSIS — F41.1 GENERALIZED ANXIETY DISORDER: ICD-10-CM

## 2023-11-14 DIAGNOSIS — F51.5 NIGHTMARES: ICD-10-CM

## 2023-11-14 DIAGNOSIS — G47.00 INSOMNIA, UNSPECIFIED TYPE: ICD-10-CM

## 2023-11-21 ENCOUNTER — TELEMEDICINE (OUTPATIENT)
Dept: BEHAVIORAL HEALTH | Facility: CLINIC | Age: 33
End: 2023-11-21
Payer: COMMERCIAL

## 2023-11-21 DIAGNOSIS — F41.1 GENERALIZED ANXIETY DISORDER: ICD-10-CM

## 2023-11-21 DIAGNOSIS — F20.89 SCHIZOPHRENIA, SIMPLE, CHRONIC: ICD-10-CM

## 2023-11-21 DIAGNOSIS — F51.5 NIGHTMARES: ICD-10-CM

## 2023-11-21 DIAGNOSIS — G47.00 INSOMNIA, UNSPECIFIED TYPE: ICD-10-CM

## 2023-11-21 NOTE — PROGRESS NOTES
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 for pt to immediately call the office for any new or worsening symptoms, such as worsening depression; feeling nervous or restless; suicidal thoughts or actions; or other changes changes in mood or behavior, and all other concerns. Pt educated on med compliance and the risks of suddenly stopping this medication or missing doses. Pt verbalized understanding and is agreeable to taking Gabapentin. Addressed all questions and concerns.  Will order UDS and obtain CORRINE report. Pt verbally signed controlled substances agreement.    Cap[place]ta, Risks, benefits, alternatives discussed with patient including nausea and vomiting, GI upset, sedation, akathisia, theoretical risk of tardive dyskinesia, and weight gain. Use care when operating vehicle, vessel, or machine. After discussion of these risks and benefits, the patient voiced understanding and agreed to proceed.    Discussed all risks, benefits, alternatives, and side effects of Fluoxetine including but not limited to GI upset, decreased appetite, sexual dysfunction, bleeding risk, seizure risk, insomnia, anxiety, drowsiness, headache, asthenia, tremor, nervousness, activation of kory or hypomania, increased fragility fracture risk, hyponatremia, ocular effects, withdrawal syndrome following abrupt discontinuation, serotonin syndrome, hypersensitivity reaction, and activation of suicidal ideation  and behavior.  Pt educated on the need to practice safe sex while taking this med. Discussed the need for pt to immediately call the office for any new or worsening symptoms, such as worsening depression; feeling nervous or restless; suicidal thoughts or actions; or other changes changes in mood or behavior, and all other concerns. Pt educated on med compliance and the risks of suddenly stopping this medication or missing doses. Pt verbalized understanding and is agreeable to taking Fluoxetine. Addressed all questions and concerns.     Discussed all risks, benefits, alternatives, and side effects of Hydroxyzine including but not limited to dry mouth, sedation, tremor, respiratory depression, acute generalized exanthematous pustulosis, CNS depression, drowsiness, cognitive dysfunction, dizziness, falls risk, prolonged QT interval, torsades de pointes, hallucination, involuntary body movements, seizure, and headache. Pt denies known h/o prolonged QT interval. Pt educated on the need to practice safe sex while taking this med. Discussed the need for pt to immediately call the office for any new or worsening symptoms, such as changes changes in mood or behavior, and all other concerns. Pt verbalized understanding and is agreeable to taking Hydroxyzine. Addressed all questions and concerns.       Follow up:   F/u in 1 month      TREATMENT PLAN/GOALS: Continue supportive psychotherapy efforts and medications as indicated. Treatment and medication options discussed during today's visit. Patient ackowledged and verbally consented to continue with current treatment plan and was educated on the importance of compliance with treatment and follow-up appointments.    MEDICATION ISSUES:  CORRINE reviewed as expected.  Discussed medication options and treatment plan of prescribed medication as well as the risks, benefits, and side effects including potential falls, possible impaired driving and metabolic adversities among others.  Patient is agreeable to call the office with any worsening of symptoms or onset of side effects. Patient is agreeable to call 911 or go to the nearest ER should he/she begin having SI/HI. No medication side effects or related complaints today.            This document has been electronically signed by Melly Giang PA-C  November 21, 2023 11:15 EST      Part of this note may be an electronic transcription/translation of spoken language to printed text using the Dragon Dictation System.

## 2023-12-14 ENCOUNTER — TELEPHONE (OUTPATIENT)
Dept: PSYCHIATRY | Facility: CLINIC | Age: 33
End: 2023-12-14
Payer: COMMERCIAL

## 2023-12-15 NOTE — TELEPHONE ENCOUNTER
PA APPROVAL RECEIVED FROM INSURANCE.    APPROVAL DATES ARE: 12/14/23-12/13/2024  BEHAVIORAL HEALTH - SCAN - [place] APPROVAL [place]; MEDIMPACT; 12/14/23. (12/15/2023)     I CALLED PT TO INFORM OF APPROVAL.  PT DID NOT ANSWER.  I LEFT VOICEMAIL INDICATING OF APPROVAL USING BASIC INFORMATION.    NOTHING FURTHER IS NEEDED AT THIS TIME.

## 2023-12-26 ENCOUNTER — TELEMEDICINE (OUTPATIENT)
Dept: PSYCHIATRY | Facility: CLINIC | Age: 33
End: 2023-12-26
Payer: COMMERCIAL

## 2023-12-26 DIAGNOSIS — F41.1 GAD (GENERALIZED ANXIETY DISORDER): ICD-10-CM

## 2023-12-26 DIAGNOSIS — F33.9 EPISODE OF RECURRENT MAJOR DEPRESSIVE DISORDER, UNSPECIFIED DEPRESSION EPISODE SEVERITY: Primary | ICD-10-CM

## 2023-12-26 NOTE — PSYCHOTHERAPY NOTE
Saw family for [place]  - leonarda sparkle, Xbox live, play mat for magic card   valuable card-  want to save it so I could pay for a car or college   Jocelyne -nephew   Wood in my eye- need to have it looked at     Good mood -morning person - sleepy   Less anxious in the winter, no storms   New game Remnant 2- we     No anger  or sadness,    Lost weight,  from 6x to a 2x-  helped brother a lot, cut wood, less snacking less junk food ,smaller portions   I want to lose more,   meds are good  Continue with treatment, social, exercise, diet

## 2023-12-26 NOTE — PROGRESS NOTES
Progress Note    Date: 12/26/2023  Time In: 09:01  Time Out: 09:31    Patient Legal Name: Anthony Tay  Patient Age: 33 y.o.    CHIEF COMPLAINT: mood     Subjective   History of Present Illness     Mode of visit: Video  Location of provider: [address], [place] [address]  Location of patient: home     Patient is being seen via telehealth (through [facility]) and patient confirms that they are in a secure environment for this session. The patient's condition being diagnosed/treated is appropriate for telemedicine. The provider identified herself as well as her credentials. The patient gave consent to be seen remotely, and when consent is given they understand that the consent allows for patient identifiable information to be sent to a third party as needed. They may refuse to be seen remotely at any time. The electronic data is encrypted and password protected, and the patient has been advised of the potential risks to privacy not withstanding such measures. Patient consented to the use of video for the purpose of a telehealth session today. The visit included audio and video interaction. No technical issues occurred during this visit.  Anthony returns today for ongoing psychotherapy.  Our most recent session was on 10/19/23.  At that time goals were fo   Recognize and maintain progress   Psychotherapy for symptom relief        Assessment    Mental Status Exam     Appearance: good hygiene and dressed appropriately for the weather  Behavior: calm  Cooperation:  engaged, cooperative, attentive, and friendly  Eye Contact:  good  Affect:  bright  Mood: expressive and happy  Speech: responsive and talkative  Thought Process:  organized  Thought Content: appropriate  Suicidal: denies  Homicidal:  denies  Hallucinations:  denies  Memory:  intact  Orientation:  person, place, time, and situation  Reliability:  reliable  Insight:  good  Judgment:  good     Clinical Intervention        ICD-10-CM ICD-9-CM   1. Episode of recurrent major depressive disorder, unspecified depression episode severity  F33.9 296.30   2. RACHEL (generalized anxiety disorder)  F41.1 300.02      Anthony is a 33 y.o. male who presents today as a follow-up for continued psychotherapy. Individual psychotherapy was provided utilizing solution focused  techniques to restore adaptive functioning, support self-esteem, identify goals for treatment, discuss values and strengths, acknowledge sources of feelings and behaviors, assess symptoms, and provide support. Anthony reports that his mood is good, denies any symptoms of anxiety or depression  He sleeps well and feels that he continues to improve.  He reports compliance with medication.  He has initiated a healthier lifestyle, has lost  a lot of weight. He has noticed improvement in how he feels and is motivated to continue to build on this success.  He is engaged socially with his family and plays games over the internet with friends.  He denies any interpersonal challenges and feels that he is doing very well  He would like to continue to lose weight and improve his health      Plan   Plan & Goals     Continue with compliance in treatment  Maintain healthy lifestyle,continue with weight loss and increased activity level  Psychotherapy for symptom relief     Patient acknowledged and verbally consented to continue working toward resolving current treatment plan goals and was educated on the importance of participation in the therapeutic process.  Patient will remain compliant with medication regimen as prescribed. Discuss any medication side effects, questions or concerns with prescribing provider.  Call 911 or present to the nearest emergency room in an emergency situation.  National Suicide Prevention Lifeline: Call 988. The Lifeline provides 24/7, free and confidential support for people in distress, prevention and crisis resources.  Crisis Text Line  Text HOME To  420248    Return in about 4 weeks (around 1/23/2024).    ____________________  This document has been electronically signed by Madison Yeager LCSW  December 26, 2023 09:39 EST    Part of this note may be an electronic transcription/translation of spoken language to printed text using the Dragon Dictation System.

## 2024-01-03 ENCOUNTER — TELEMEDICINE (OUTPATIENT)
Dept: PSYCHIATRY | Facility: CLINIC | Age: 34
End: 2024-01-03
Payer: COMMERCIAL

## 2024-01-03 DIAGNOSIS — F99 INSOMNIA DUE TO OTHER MENTAL DISORDER: ICD-10-CM

## 2024-01-03 DIAGNOSIS — F51.05 INSOMNIA DUE TO OTHER MENTAL DISORDER: ICD-10-CM

## 2024-01-03 DIAGNOSIS — F20.89 SCHIZOPHRENIA, SIMPLE, CHRONIC: Primary | ICD-10-CM

## 2024-01-03 DIAGNOSIS — F51.5 NIGHTMARES: ICD-10-CM

## 2024-01-03 DIAGNOSIS — F41.1 GENERALIZED ANXIETY DISORDER: ICD-10-CM

## 2024-01-03 RX ORDER — LUMATEPERONE 42 MG/1
1 CAPSULE ORAL DAILY
Qty: 90 CAPSULE | Refills: 0 | Status: SHIPPED | OUTPATIENT
Start: 2024-01-03 | End: 2024-04-02

## 2024-01-03 RX ORDER — FLUOXETINE HYDROCHLORIDE 40 MG/1
80 CAPSULE ORAL EVERY MORNING
Qty: 180 CAPSULE | Refills: 0 | Status: SHIPPED | OUTPATIENT
Start: 2024-01-03 | End: 2024-04-02

## 2024-01-03 RX ORDER — GABAPENTIN 600 MG/1
TABLET ORAL
Qty: 90 TABLET | Refills: 0 | Status: SHIPPED | OUTPATIENT
Start: 2024-01-03

## 2024-01-03 RX ORDER — HYDROXYZINE HYDROCHLORIDE 25 MG/1
TABLET, FILM COATED ORAL
Qty: 60 TABLET | Refills: 0 | Status: SHIPPED | OUTPATIENT
Start: 2024-01-03

## 2024-01-03 NOTE — PROGRESS NOTES
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 capsule by mouth Daily for 90 days.     Dispense:  90 capsule     Refill:  0    FLUoxetine (PROzac) 40 MG capsule     Sig: Take 2 capsules by mouth Every Morning for 90 days.     Dispense:  180 capsule     Refill:  0    hydrOXYzine (ATARAX) 25 MG tablet     Sig: TAKE 1 TO 2 TABLETS BY MOUTH ONCE DAILY AS NEEDED SEVERE  ANXIETY     Dispense:  60 tablet     Refill:  0    gabapentin (Neurontin) 600 MG tablet     Sig: Take 1 tab PO QHS for sleep     Dispense:  90 tablet     Refill:  0       Discussed all risks, benefits, alternatives, and side effects of Gabapentin including but not limited to sedation, dizziness, GI upset, dry mouth, and weight gain. Pt instructed to avoid driving and doing other tasks or actions that require to be alert until knowing how the drug affects them.  Pt educated on the need to practice safe sex while taking this med. Discussed the need for pt to immediately call the office for any new or worsening symptoms, such as worsening depression; feeling nervous or restless; suicidal thoughts or actions; or other changes changes in mood or behavior, and all other concerns. Pt educated on med compliance and the risks of suddenly stopping this medication or missing doses. Pt verbalized understanding and is agreeable to taking Gabapentin. Addressed all questions and concerns.  Will order UDS and obtain CORRINE  report. Pt verbally signed controlled substances agreement.    Caplyta, Risks, benefits, alternatives discussed with patient including nausea and vomiting, GI upset, sedation, akathisia, theoretical risk of tardive dyskinesia, and weight gain. Use care when operating vehicle, vessel, or machine. After discussion of these risks and benefits, the patient voiced understanding and agreed to proceed.    Discussed all risks, benefits, alternatives, and side effects of Fluoxetine including but not limited to GI upset, decreased appetite, sexual dysfunction, bleeding risk, seizure risk, insomnia, anxiety, drowsiness, headache, asthenia, tremor, nervousness, activation of kory or hypomania, increased fragility fracture risk, hyponatremia, ocular effects, withdrawal syndrome following abrupt discontinuation, serotonin syndrome, hypersensitivity reaction, and activation of suicidal ideation and behavior.  Pt educated on the need to practice safe sex while taking this med. Discussed the need for pt to immediately call the office for any new or worsening symptoms, such as worsening depression; feeling nervous or restless; suicidal thoughts or actions; or other changes changes in mood or behavior, and all other concerns. Pt educated on med compliance and the risks of suddenly stopping this medication or missing doses. Pt verbalized understanding and is agreeable to taking Fluoxetine. Addressed all questions and concerns.     Discussed all risks, benefits, alternatives, and side effects of Hydroxyzine including but not limited to dry mouth, sedation, tremor, respiratory depression, acute generalized exanthematous pustulosis, CNS depression, drowsiness, cognitive dysfunction, dizziness, falls risk, prolonged QT interval, torsades de pointes, hallucination, involuntary body movements, seizure, and headache. Pt denies known h/o prolonged QT interval. Pt educated on the need to practice safe sex while taking this med. Discussed the  need for pt to immediately call the office for any new or worsening symptoms, such as changes changes in mood or behavior, and all other concerns. Pt verbalized understanding and is agreeable to taking Hydroxyzine. Addressed all questions and concerns.       Follow up:   F/u in 1 month      TREATMENT PLAN/GOALS: Continue supportive psychotherapy efforts and medications as indicated. Treatment and medication options discussed during today's visit. Patient ackowledged and verbally consented to continue with current treatment plan and was educated on the importance of compliance with treatment and follow-up appointments.    MEDICATION ISSUES:  CORRINE reviewed as expected.  Discussed medication options and treatment plan of prescribed medication as well as the risks, benefits, and side effects including potential falls, possible impaired driving and metabolic adversities among others. Patient is agreeable to call the office with any worsening of symptoms or onset of side effects. Patient is agreeable to call 911 or go to the nearest ER should he/she begin having SI/HI. No medication side effects or related complaints today.            This document has been electronically signed by Melly Giang PA-C  January 3, 2024 10:32 EST      Part of this note may be an electronic transcription/translation of spoken language to printed text using the Dragon Dictation System.

## 2024-01-10 ENCOUNTER — OFFICE VISIT (OUTPATIENT)
Dept: FAMILY MEDICINE CLINIC | Facility: CLINIC | Age: 34
End: 2024-01-10
Payer: COMMERCIAL

## 2024-01-10 VITALS
OXYGEN SATURATION: 98 % | HEART RATE: 104 BPM | TEMPERATURE: 96.9 F | SYSTOLIC BLOOD PRESSURE: 148 MMHG | BODY MASS INDEX: 37.19 KG/M2 | WEIGHT: 315 LBS | DIASTOLIC BLOOD PRESSURE: 100 MMHG | HEIGHT: 77 IN

## 2024-01-10 DIAGNOSIS — M25.50 ARTHRALGIA, UNSPECIFIED JOINT: ICD-10-CM

## 2024-01-10 DIAGNOSIS — H44.813: ICD-10-CM

## 2024-01-10 DIAGNOSIS — E03.9 HYPOTHYROIDISM, UNSPECIFIED TYPE: ICD-10-CM

## 2024-01-10 DIAGNOSIS — R60.9 PERIPHERAL EDEMA: ICD-10-CM

## 2024-01-10 DIAGNOSIS — I10 ESSENTIAL HYPERTENSION: Primary | ICD-10-CM

## 2024-01-10 LAB
ALBUMIN SERPL-MCNC: 4.2 G/DL (ref 3.5–5.2)
ALBUMIN/GLOB SERPL: 1.4 G/DL
ALP SERPL-CCNC: 117 U/L (ref 39–117)
ALT SERPL W P-5'-P-CCNC: 20 U/L (ref 1–41)
ANION GAP SERPL CALCULATED.3IONS-SCNC: 9 MMOL/L (ref 5–15)
AST SERPL-CCNC: 27 U/L (ref 1–40)
BASOPHILS # BLD AUTO: 0.06 10*3/MM3 (ref 0–0.2)
BASOPHILS NFR BLD AUTO: 0.5 % (ref 0–1.5)
BILIRUB SERPL-MCNC: 0.5 MG/DL (ref 0–1.2)
BUN SERPL-MCNC: 12 MG/DL (ref 6–20)
BUN/CREAT SERPL: 8.2 (ref 7–25)
CALCIUM SPEC-SCNC: 9.8 MG/DL (ref 8.6–10.5)
CHLORIDE SERPL-SCNC: 101 MMOL/L (ref 98–107)
CHOLEST SERPL-MCNC: 189 MG/DL (ref 0–200)
CO2 SERPL-SCNC: 29 MMOL/L (ref 22–29)
CREAT SERPL-MCNC: 1.46 MG/DL (ref 0.76–1.27)
DEPRECATED RDW RBC AUTO: 42 FL (ref 37–54)
EGFRCR SERPLBLD CKD-EPI 2021: 64.7 ML/MIN/1.73
EOSINOPHIL # BLD AUTO: 0.31 10*3/MM3 (ref 0–0.4)
EOSINOPHIL NFR BLD AUTO: 2.8 % (ref 0.3–6.2)
ERYTHROCYTE [DISTWIDTH] IN BLOOD BY AUTOMATED COUNT: 14.4 % (ref 12.3–15.4)
GLOBULIN UR ELPH-MCNC: 3 GM/DL
GLUCOSE SERPL-MCNC: 90 MG/DL (ref 65–99)
HBA1C MFR BLD: 5.4 % (ref 4.8–5.6)
HCT VFR BLD AUTO: 47.8 % (ref 37.5–51)
HDLC SERPL-MCNC: 31 MG/DL (ref 40–60)
HGB BLD-MCNC: 15.9 G/DL (ref 13–17.7)
IMM GRANULOCYTES # BLD AUTO: 0.06 10*3/MM3 (ref 0–0.05)
IMM GRANULOCYTES NFR BLD AUTO: 0.5 % (ref 0–0.5)
LDLC SERPL CALC-MCNC: 135 MG/DL (ref 0–100)
LDLC/HDLC SERPL: 4.28 {RATIO}
LYMPHOCYTES # BLD AUTO: 1.71 10*3/MM3 (ref 0.7–3.1)
LYMPHOCYTES NFR BLD AUTO: 15.4 % (ref 19.6–45.3)
MCH RBC QN AUTO: 27.3 PG (ref 26.6–33)
MCHC RBC AUTO-ENTMCNC: 33.3 G/DL (ref 31.5–35.7)
MCV RBC AUTO: 82 FL (ref 79–97)
MONOCYTES # BLD AUTO: 0.57 10*3/MM3 (ref 0.1–0.9)
MONOCYTES NFR BLD AUTO: 5.1 % (ref 5–12)
NEUTROPHILS NFR BLD AUTO: 75.7 % (ref 42.7–76)
NEUTROPHILS NFR BLD AUTO: 8.4 10*3/MM3 (ref 1.7–7)
NRBC BLD AUTO-RTO: 0 /100 WBC (ref 0–0.2)
PLATELET # BLD AUTO: 272 10*3/MM3 (ref 140–450)
PMV BLD AUTO: 9.9 FL (ref 6–12)
POTASSIUM SERPL-SCNC: 4.1 MMOL/L (ref 3.5–5.2)
PROT SERPL-MCNC: 7.2 G/DL (ref 6–8.5)
RBC # BLD AUTO: 5.83 10*6/MM3 (ref 4.14–5.8)
SODIUM SERPL-SCNC: 139 MMOL/L (ref 136–145)
TRIGL SERPL-MCNC: 126 MG/DL (ref 0–150)
TSH SERPL DL<=0.05 MIU/L-ACNC: 2.31 UIU/ML (ref 0.27–4.2)
VLDLC SERPL-MCNC: 23 MG/DL (ref 5–40)
WBC NRBC COR # BLD AUTO: 11.11 10*3/MM3 (ref 3.4–10.8)

## 2024-01-10 PROCEDURE — 83036 HEMOGLOBIN GLYCOSYLATED A1C: CPT | Performed by: NURSE PRACTITIONER

## 2024-01-10 PROCEDURE — 80053 COMPREHEN METABOLIC PANEL: CPT | Performed by: NURSE PRACTITIONER

## 2024-01-10 PROCEDURE — 84443 ASSAY THYROID STIM HORMONE: CPT | Performed by: NURSE PRACTITIONER

## 2024-01-10 PROCEDURE — 80061 LIPID PANEL: CPT | Performed by: NURSE PRACTITIONER

## 2024-01-10 PROCEDURE — 85025 COMPLETE CBC W/AUTO DIFF WBC: CPT | Performed by: NURSE PRACTITIONER

## 2024-01-10 RX ORDER — HYDRALAZINE HYDROCHLORIDE 50 MG/1
TABLET, FILM COATED ORAL
Qty: 90 TABLET | Refills: 0 | Status: SHIPPED | OUTPATIENT
Start: 2024-01-10

## 2024-01-10 RX ORDER — CLONIDINE HYDROCHLORIDE 0.1 MG/1
0.1 TABLET ORAL 2 TIMES DAILY
Qty: 60 TABLET | Refills: 1 | Status: SHIPPED | OUTPATIENT
Start: 2024-01-10

## 2024-01-10 RX ORDER — LEVOTHYROXINE SODIUM 0.05 MG/1
50 TABLET ORAL DAILY
Qty: 90 TABLET | Refills: 0 | Status: SHIPPED | OUTPATIENT
Start: 2024-01-10

## 2024-01-10 RX ORDER — AMLODIPINE BESYLATE 10 MG/1
10 TABLET ORAL DAILY
Qty: 90 TABLET | Refills: 0 | Status: SHIPPED | OUTPATIENT
Start: 2024-01-10

## 2024-01-10 RX ORDER — ADHESIVE BANDAGE 3/4"
BANDAGE TOPICAL
Qty: 1 EACH | Refills: 0 | Status: SHIPPED | OUTPATIENT
Start: 2024-01-10

## 2024-01-10 RX ORDER — BUSPIRONE HYDROCHLORIDE 10 MG/1
2 TABLET ORAL EVERY 12 HOURS SCHEDULED
COMMUNITY
Start: 2023-12-19

## 2024-01-10 RX ORDER — LOSARTAN POTASSIUM 100 MG/1
100 TABLET ORAL DAILY
Qty: 90 TABLET | Refills: 0 | Status: SHIPPED | OUTPATIENT
Start: 2024-01-10

## 2024-01-10 RX ORDER — DICLOFENAC SODIUM 75 MG/1
75 TABLET, DELAYED RELEASE ORAL 2 TIMES DAILY
Qty: 180 TABLET | Refills: 0 | Status: SHIPPED | OUTPATIENT
Start: 2024-01-10

## 2024-01-10 NOTE — PROGRESS NOTES
Chief Complaint  Eye Problem (Went to eye dr a few days ago for right eye pain, was told there is a hemorrhage in the right eye and the left eye, was told it is hypertension or diabetes, having headaches with it too.)    Subjective        Past Medical History:   Diagnosis Date    Allergic rhinitis     Anxiety     Asperger's syndrome     Colon polyps 07/18/2014    Depression     GERD (gastroesophageal reflux disease) 01/23/2015    Hematuria, microscopic 03/25/2014    3/25//2014 large blood, > 300 protein u/a in office on repeat from outpt labs.    Hypertension     Microscopic hematuria 3/25/2014    Resistant hypertension 02/03/2020    Tourette's     Tourette's disorder 03/06/2014     Social History     Tobacco Use    Smoking status: Some Days     Packs/day: 0.05     Years: 15.00     Additional pack years: 0.00     Total pack years: 0.75     Types: Cigarettes     Start date: 2009     Passive exposure: Current    Smokeless tobacco: Never    Tobacco comments:     Stopped smoking at age 29; social smoker   Vaping Use    Vaping Use: Never used   Substance Use Topics    Alcohol use: Never    Drug use: Not Currently      Current Outpatient Medications on File Prior to Visit   Medication Sig    busPIRone (BUSPAR) 10 MG tablet Take 2 tablets by mouth Every 12 (Twelve) Hours.    Caplyta 42 MG capsule Take 1 capsule by mouth Daily for 90 days.    FLUoxetine (PROzac) 40 MG capsule Take 2 capsules by mouth Every Morning for 90 days.    gabapentin (Neurontin) 600 MG tablet Take 1 tab PO QHS for sleep    hydrOXYzine (ATARAX) 25 MG tablet TAKE 1 TO 2 TABLETS BY MOUTH ONCE DAILY AS NEEDED SEVERE  ANXIETY    [DISCONTINUED] amLODIPine (NORVASC) 10 MG tablet Take 1 tablet by mouth Daily.    [DISCONTINUED] diclofenac (VOLTAREN) 75 MG EC tablet Take 1 tablet by mouth 2 (Two) Times a Day.    [DISCONTINUED] hydrALAZINE (APRESOLINE) 50 MG tablet TAKE 2 TABLETS BY MOUTH EVERY MORNING AND 1 TABLET EVERY EVENING    [DISCONTINUED] levothyroxine  "(SYNTHROID, LEVOTHROID) 50 MCG tablet Take 1 tablet by mouth Daily.    [DISCONTINUED] losartan (COZAAR) 100 MG tablet Take 1 tablet by mouth Daily.    [DISCONTINUED] dicyclomine (BENTYL) 20 MG tablet Take 1 tablet by mouth Every 8 (Eight) Hours As Needed (abdominal pain).    [DISCONTINUED] flecainide (TAMBOCOR) 50 MG tablet Take 1 tablet by mouth 2 (Two) Times a Day. (Patient not taking: Reported on 6/16/2023)    [DISCONTINUED] ondansetron ODT (ZOFRAN-ODT) 4 MG disintegrating tablet Place 1 tablet on the tongue Every 8 (Eight) Hours As Needed for Nausea or Vomiting.    [DISCONTINUED] vitamin D (ERGOCALCIFEROL) 1.25 MG (41836 UT) capsule capsule Take 1 capsule by mouth Every 7 (Seven) Days.     No current facility-administered medications on file prior to visit.      Allergies   Allergen Reactions    Shellfish Allergy Swelling    Morphine Other (See Comments)     unk      Health Maintenance Due   Topic Date Due    Pneumococcal Vaccine 0-64 (1 of 2 - PCV) Never done    ANNUAL PHYSICAL  Never done    COVID-19 Vaccine (3 - 2023-24 season) 09/01/2023      Anthony Tay presents to [facility]  Eye Problem       Here due to bilateral fundus hemorrhages with more on the right than left and told to follow up due to either from HTN or DM. Does not monitor glucose at home. Unknown home BP reading, could not get a reading at eye exam.     Pt notes his right eye is painful today.   Objective   Vital Signs:   /100 (BP Location: Left arm)   Pulse 104   Temp 96.9 °F (36.1 °C)   Ht 194.9 cm (76.75\")   Wt (!) 162 kg (357 lb)   SpO2 98%   BMI 42.61 kg/m²     Review of Systems   Cardiovascular:  Negative for chest pain and palpitations.   Neurological:  Positive for headache. Negative for dizziness and light-headedness.      Physical Exam  Vitals reviewed.   Constitutional:       General: He is not in acute distress.     Appearance: Normal appearance. He is well-developed.   HENT:     "  Head: Normocephalic and atraumatic.   Eyes:      Conjunctiva/sclera: Conjunctivae normal.      Right eye: Right conjunctiva is injected.      Pupils: Pupils are equal, round, and reactive to light.   Cardiovascular:      Rate and Rhythm: Normal rate and regular rhythm.      Heart sounds: Normal heart sounds.   Pulmonary:      Effort: Pulmonary effort is normal.      Breath sounds: Normal breath sounds.   Musculoskeletal:      Cervical back: Neck supple.      Right lower leg: No edema.      Left lower leg: No edema.   Skin:     General: Skin is warm and dry.   Neurological:      Mental Status: He is alert and oriented to person, place, and time.   Psychiatric:         Mood and Affect: Mood and affect normal.         Behavior: Behavior normal.         Thought Content: Thought content normal.         Judgment: Judgment normal.        Result Review :   The following data was reviewed by: RHODA Lunsford on 01/10/2024:  Common labs          4/9/2023    22:47 4/20/2023    08:44   Common Labs   Glucose 110  93    BUN 10  9    Creatinine 1.19  1.49    Sodium 137  138    Potassium 3.7  4.0    Chloride 100  102    Calcium 9.1  10.1    Albumin 3.8  4.3    Total Bilirubin 0.4  0.4    Alkaline Phosphatase 126  114    AST (SGOT) 26  28    ALT (SGPT) 23  36    WBC 18.63  9.80    Hemoglobin 17.9  16.6    Hematocrit 52.8  49.6    Platelets 340  322    Total Cholesterol  143    Triglycerides  97    HDL Cholesterol  32    LDL Cholesterol   93      TSH          4/20/2023    08:44   TSH   TSH 1.780                Assessment and Plan    Diagnoses and all orders for this visit:    1. Essential hypertension (Primary)  -     Urinalysis With Culture If Indicated -  -     CBC & Differential  -     Comprehensive Metabolic Panel  -     Lipid Panel  -     TSH Rfx On Abnormal To Free T4  -     cloNIDine (Catapres) 0.1 MG tablet; Take 1 tablet by mouth 2 (Two) Times a Day.  Dispense: 60 tablet; Refill: 1  -     Blood Pressure Monitoring  (Blood Pressure Cuff) misc; Use as directed to monitor blood pressure once daily  Dispense: 1 each; Refill: 0  -     losartan (COZAAR) 100 MG tablet; Take 1 tablet by mouth Daily.  Dispense: 90 tablet; Refill: 0  -     amLODIPine (NORVASC) 10 MG tablet; Take 1 tablet by mouth Daily.  Dispense: 90 tablet; Refill: 0    2. Eye hemorrhage, bilateral  -     Hemoglobin A1c    3. Peripheral edema  -     hydrALAZINE (APRESOLINE) 50 MG tablet; TAKE 2 TABLETS BY MOUTH EVERY MORNING AND 1 TABLET EVERY EVENING  Dispense: 90 tablet; Refill: 0    4. Hypothyroidism, unspecified type  -     levothyroxine (SYNTHROID, LEVOTHROID) 50 MCG tablet; Take 1 tablet by mouth Daily.  Dispense: 90 tablet; Refill: 0    5. Arthralgia, unspecified joint  -     diclofenac (VOLTAREN) 75 MG EC tablet; Take 1 tablet by mouth 2 (Two) Times a Day.  Dispense: 180 tablet; Refill: 0    Add Clonidine twice daily. Patient to monitor blood pressure and follow-up in 2 to 4 weeks.  Patient to follow a heart healthy and low carbohydrate diet to help lower blood sugar, blood pressure, and weight.  Discussed with patient to make sure he is taking his medication daily as his medications were last refilled 9 months ago with only a 6-month supply.  Refill current medications and fasting labs pending.    Class 3 Severe Obesity (BMI >=40). Obesity-related health conditions include the following: hypertension. Obesity is improving with lifestyle modifications. BMI is is above average; BMI management plan is completed. We discussed portion control and increasing exercise.       Follow Up   Return in about 4 weeks (around 2/7/2024) for Recheck.  Patient was given instructions and counseling regarding his condition or for health maintenance advice. Please see specific information pulled into the AVS if appropriate.

## 2024-01-10 NOTE — PROGRESS NOTES
Venipuncture Blood Specimen Collection  Venipuncture performed in left arm by Thien Nava with good hemostasis. Patient tolerated the procedure well without complications.   01/10/24   Thien Nava

## 2024-01-15 NOTE — PROGRESS NOTES
Progress Note    Date: 01/16/2024  Time In: 10:00am  Time Out: 10:40am    Patient Legal Name: Anthony Tay  Patient Age: 33 y.o.    CHIEF COMPLAINT: depression and anxiety     Subjective   History of Present Illness   Mode of visit: Video  Location of provider: [address], [place] [address]  Location of patient: home     Patient is being seen via telehealth (through [employer]) and patient confirms that they are in a secure environment for this session. The patient's condition being diagnosed/treated is appropriate for telemedicine. The provider identified herself as well as her credentials. The patient gave consent to be seen remotely, and when consent is given they understand that the consent allows for patient identifiable information to be sent to a third party as needed. They may refuse to be seen remotely at any time. The electronic data is encrypted and password protected, and the patient has been advised of the potential risks to privacy not withstanding such measures. Patient consented to the use of video for the purpose of a telehealth session today. The visit included audio and video interaction. Some audio technical issues occurred during this visit,     Anthony returns today for ongoing treatment.  Goals from our previous session on 12/26/23 were to   Continue with compliance in treatment  Maintain healthy lifestyle,continue with weight loss and increased activity level  Psychotherapy for symptom relief     Assessment    Mental Status Exam     Appearance: good hygiene and dressed appropriately for the weather  Behavior: calm  Cooperation:  engaged, cooperative, attentive, and friendly  Eye Contact:  good  Affect:  bright and congruent  Mood: expressive  Speech: responsive and talkative  Thought Process:  organized  Thought Content: appropriate  Suicidal: denies  Homicidal:  denies  Hallucinations:  denies  Memory:  intact  Orientation:  person, place, time, and  situation  Reliability:  reliable  Insight:  good  Judgment:  good     Clinical Intervention       ICD-10-CM ICD-9-CM   1. RACHEL (generalized anxiety disorder)  F41.1 300.02   2. Recurrent major depressive disorder, remission status unspecified  F33.9 296.30      Anthony is a 33 y.o. male who presents today as a follow-up for continued psychotherapy. Individual psychotherapy was provided utilizing solution focused  techniques to provide symptom relief, support self-esteem, encourage change talk, manage stress, identify triggers, recognize patterns of behavior, identify strengths, build confidence, assess symptoms, challenge negative thinking patterns, and provide support. Anthony reports that he continues to have sporadic anxiety.  Triggering events are going to big stores with lots of people and also storms.  Discussed negative self talk and efforts to change that . We also discussed coping strategies such as deep breathing.  He continues to lose weight, is complaint with treatment.  He feels that his medication is effective but complains of dry mouth and daytime sleepiness.. He will talk to his provider Jose A ALANIS about this next visit.  He is trying to quit smoking.  We discussed financial concerns and how smoking cessation can improve his finances and allow him opportunities to engage with others and also depend less on his brother.     Plan   Plan & Goals     Continued efforts toward a healthy lifestyle to include losing weight and quitting smoking  Save money to improve ability to be independence  Challenge negative self talk that leads to anxiety     Patient acknowledged and verbally consented to continue working toward resolving current treatment plan goals and was educated on the importance of participation in the therapeutic process.  Patient will remain compliant with medication regimen as prescribed. Discuss any medication side effects, questions or concerns with prescribing provider.  Call 911 or  present to the nearest emergency room in an emergency situation.  National Suicide Prevention Lifeline: Call 988. The Lifeline provides 24/7, free and confidential support for people in distress, prevention and crisis resources.  Crisis Text Line  Text HOME To 675582    Return in about 3 weeks (around 2/6/2024).    ____________________  This document has been electronically signed by Madison Yeager LCSW  January 16, 2024 10:50 EST    Part of this note may be an electronic transcription/translation of spoken language to printed text using the Dragon Dictation System.

## 2024-01-16 ENCOUNTER — TELEMEDICINE (OUTPATIENT)
Dept: PSYCHIATRY | Facility: CLINIC | Age: 34
End: 2024-01-16
Payer: COMMERCIAL

## 2024-01-16 DIAGNOSIS — F41.1 GAD (GENERALIZED ANXIETY DISORDER): Primary | ICD-10-CM

## 2024-01-16 DIAGNOSIS — F33.9 RECURRENT MAJOR DEPRESSIVE DISORDER, REMISSION STATUS UNSPECIFIED: ICD-10-CM

## 2024-01-16 NOTE — PSYCHOTHERAPY NOTE
Everything is good   hemmorages in my right eye, Bp was too high     Royal purple was favorite color, dont make it anymore, snails      Always wanted to be an artist,  very expensive   Cousin went to art school,   Love music and art    can draw,  need to get pencils, sketch   Sonya saldaña- brother said I spend to much   Mom did tattoos, I draw for family   Good with brother,  he asked me to bring in wood   Lost more weight,  3 months    I was 500 lbs,  350  lbs   Melly next week,  get sleepy  during am     Anxious when  I go to the store   Do breathing and count      [employer]   Walk self through it   Try to   I got in my head yesterday, I dont own anything  relationship- never good at dating,  want to do that   we are too far away   Got to get out there, brother not comfortable with it,   I'm not that picky   Used to be in a [employer] league, my brother does not like to take me anywhere   If I could find a scooter- I could -   Trying to  quit smoking  $140 month   Have not had a cigarette in 3-4 days   
4 = No assist / stand by assistance

## 2024-02-06 NOTE — PROGRESS NOTES
Progress Note    Date: 02/07/2024  Time In: 10:00  Time Out: 10:54    Patient Legal Name: Anthony Tay  Patient Age: 33 y.o.    CHIEF COMPLAINT: anxiety and depression     Subjective   History of Present Illness   Mode of visit: Video  Location of provider: [address], [place] [address]  Location of patient: home     Patient is being seen via telehealth (through [employer]) and patient confirms that they are in a secure environment for this session. The patient's condition being diagnosed/treated is appropriate for telemedicine. The provider identified herself as well as her credentials. The patient gave consent to be seen remotely, and when consent is given they understand that the consent allows for patient identifiable information to be sent to a third party as needed. They may refuse to be seen remotely at any time. The electronic data is encrypted and password protected, and the patient has been advised of the potential risks to privacy not withstanding such measures. Patient consented to the use of video for the purpose of a telehealth session today. The visit included audio and video interaction. No technical issues occurred during this visit.    Continued efforts toward a healthy lifestyle to include losing weight and quitting smoking  Save money to improve ability to be independence  Challenge negative self talk that leads to anxiety     Assessment    Mental Status Exam     Appearance: good hygiene and dressed appropriately for the weather  Behavior: calm  Cooperation:  engaged, cooperative, attentive, and friendly  Eye Contact:  good  Affect:  bright and congruent  Mood: expressive, happy, and expansive  Speech: responsive and talkative  Thought Process:  organized and goal-oriented  Thought Content: appropriate  Suicidal: denies  Homicidal:  denies  Hallucinations:  denies  Memory:  intact  Orientation:  person, place, time, and situation  Reliability:  reliable  Insight:   good  Judgment:  good     Clinical Intervention       ICD-10-CM ICD-9-CM   1. RACHEL (generalized anxiety disorder)  F41.1 300.02   2. Episode of recurrent major depressive disorder, unspecified depression episode severity  F33.9 296.30        Anthony is a 33 y.o. male who presents today as a follow-up for continued psychotherapy. Individual psychotherapy was provided utilizing solution focused  techniques to provide symptom relief, encourage expression of thoughts and feelings, discuss values and strengths, manage stress, identify triggers, recognize patterns of behavior, challenge negative thinking patterns, provide support, and discuss interpersonal conflicts.  Anthony is continuing to do well, no concerning symptoms , medication and treatment compliant  He has stopped smoking and realizing the health and financial benefits.  Discussed family dynamics, how he jenna with difficult relationship with his brother.      Plan   Plan & Goals     Continue to engage in activities that promote health and wellness.     Patient acknowledged and verbally consented to continue working toward resolving current treatment plan goals and was educated on the importance of participation in the therapeutic process.  Patient will remain compliant with medication regimen as prescribed. Discuss any medication side effects, questions or concerns with prescribing provider.  Call 911 or present to the nearest emergency room in an emergency situation.  National Suicide Prevention Lifeline: Call 988. The Lifeline provides 24/7, free and confidential support for people in distress, prevention and crisis resources.  Crisis Text Line  Text HOME To 755688    No follow-ups on file.    ____________________  This document has been electronically signed by Madison Yeager LCSW  February 7, 2024 12:07 EST    Part of this note may be an electronic transcription/translation of spoken language to printed text using the Dragon Dictation  System.

## 2024-02-07 ENCOUNTER — TELEMEDICINE (OUTPATIENT)
Dept: PSYCHIATRY | Facility: CLINIC | Age: 34
End: 2024-02-07
Payer: COMMERCIAL

## 2024-02-07 DIAGNOSIS — F33.9 EPISODE OF RECURRENT MAJOR DEPRESSIVE DISORDER, UNSPECIFIED DEPRESSION EPISODE SEVERITY: ICD-10-CM

## 2024-02-07 DIAGNOSIS — F41.1 GAD (GENERALIZED ANXIETY DISORDER): Primary | ICD-10-CM

## 2024-02-07 NOTE — PSYCHOTHERAPY NOTE
Brother said I could not do it but I did,   quit the last we talked - able to get more groceries, save   Anxiety not a problem, staying in more, breathing helps   Play guitar a little, left handed   Down from a 50 to 44 in pants  Trying to find a scooter,   Id like to visit my little brother in [place]    Dont have any contact with mom,   crackhead,  love her but can't because of the drugs, she threw everything away,  for crackhead  That's how I ended up here, lost her house,      Brother - says things that hurt, so stressed, I just ignore   He has no say in his house, ruined him- she did -  Calls me a faggot  Mom said your not hobbs   How do know....  I looked at an   Going to go visit my friend Tray, [place] - wife said   we can't go to my parents because my mother is homophobic  - his wife said- sometime this summer - Tray mother is here   Would like to have all this skin removed, blood pressure   Would like to get into 200[facility] , was 500m now 340       [facility]- check with them to see if they will take insurance- dentist

## 2024-02-08 ENCOUNTER — TELEMEDICINE (OUTPATIENT)
Dept: BEHAVIORAL HEALTH | Facility: CLINIC | Age: 34
End: 2024-02-08
Payer: COMMERCIAL

## 2024-02-08 DIAGNOSIS — F20.0 SCHIZOPHRENIA, PARANOID TYPE: Primary | ICD-10-CM

## 2024-02-08 DIAGNOSIS — F41.1 GENERALIZED ANXIETY DISORDER: ICD-10-CM

## 2024-02-08 DIAGNOSIS — F51.5 NIGHTMARES: ICD-10-CM

## 2024-02-08 DIAGNOSIS — G47.00 INSOMNIA, UNSPECIFIED TYPE: ICD-10-CM

## 2024-02-08 RX ORDER — PALIPERIDONE 6 MG/1
6 TABLET, EXTENDED RELEASE ORAL EVERY MORNING
Qty: 30 TABLET | Refills: 0 | Status: SHIPPED | OUTPATIENT
Start: 2024-02-08 | End: 2024-03-09

## 2024-02-08 RX ORDER — FLUOXETINE HYDROCHLORIDE 20 MG/1
20 CAPSULE ORAL DAILY
Qty: 90 CAPSULE | Refills: 0 | Status: SHIPPED | OUTPATIENT
Start: 2024-02-08 | End: 2024-05-08

## 2024-02-08 NOTE — PROGRESS NOTES
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 capsule; Take 1 capsule by mouth Daily for 90 days. Take with 40mg for total dose of 60mg.  Dispense: 90 capsule; Refill: 0    3. Nightmares    4. Insomnia, unspecified type          Patient screened positive for depression based on a PHQ-9 score of 12 on 11/21/2023. Follow-up recommendations include: Prescribed antidepressant medication treatment, Suicide Risk Assessment performed, and see assessment below .    Presentation seems to be most consistent with schizophrenia, RACHEL, insomnia, nightmares.  Patient states hallucinations have been bothering him and would like to try switching Caplyta.  We will discontinue Caplyta.  We will start on Invega for management of schizophrenia and overall mood.  We will decrease Prozac back down to 60 mg for management of anxiety and overall mood. We will continue gabapentin for management of insomnia, nightmares, and overall mood.  We will continue hydroxyzine for anxiety as needed. Instructed pt to contact the office for any new or worsening symptoms or any other concerns. Follow-up in 2 weeks.  Continue therapy with Madison. Addressed all questions and concerns.    Visit Diagnoses:    ICD-10-CM ICD-9-CM   1. Schizophrenia, paranoid type  F20.0 295.30   2. Generalized anxiety disorder  F41.1 300.02   3. Nightmares  F51.5 307.47   4. Insomnia,  unspecified type  G47.00 780.52           PLAN:  Safety: No acute safety concerns at this time.  Therapy: Continue therapy with Madison.  Risk Assessment: Risk of self-harm acutely is moderate.  Risk factors include anxiety disorder, mood disorder, family history, h/o suicide attempt in the past, and recent psychosocial stressors (pandemic). Protective factors include denies access to guns/weapons, no present SI, no history of self-harm in the past, minimal AODA, healthcare seeking, future orientation, willingness to engage in care.  Risk of self-harm chronically is also moderate, but could be further elevated in the event of treatment noncompliance and/or AODA.  Labs/Diagnostics Ordered:   No orders of the defined types were placed in this encounter.    Medications:   New Medications Ordered This Visit   Medications    FLUoxetine (PROzac) 20 MG capsule     Sig: Take 1 capsule by mouth Daily for 90 days. Take with 40mg for total dose of 60mg.     Dispense:  90 capsule     Refill:  0     Pt is not tolerating 80mg, will decrease back down to 60mg, thank you!    paliperidone (Invega) 6 MG 24 hr tablet     Sig: Take 1 tablet by mouth Every Morning for 30 days.     Dispense:  30 tablet     Refill:  0       Discussed all risks, benefits, alternatives, and side effects of Gabapentin including but not limited to sedation, dizziness, GI upset, dry mouth, and weight gain. Pt instructed to avoid driving and doing other tasks or actions that require to be alert until knowing how the drug affects them.  Pt educated on the need to practice safe sex while taking this med. Discussed the need for pt to immediately call the office for any new or worsening symptoms, such as worsening depression; feeling nervous or restless; suicidal thoughts or actions; or other changes changes in mood or behavior, and all other concerns. Pt educated on med compliance and the risks of suddenly stopping this medication or missing doses. Pt verbalized  understanding and is agreeable to taking Gabapentin. Addressed all questions and concerns.  Will order UDS and obtain CORRINE report. Pt verbally signed controlled substances agreement.    Invega, Risks, benefits, alternatives discussed with patient including nausea and vomiting, GI upset, sedation, akathisia, theoretical risk of tardive dyskinesia, and weight gain. Use care when operating vehicle, vessel, or machine. After discussion of these risks and benefits, the patient voiced understanding and agreed to proceed.    Discussed all risks, benefits, alternatives, and side effects of Fluoxetine including but not limited to GI upset, decreased appetite, sexual dysfunction, bleeding risk, seizure risk, insomnia, anxiety, drowsiness, headache, asthenia, tremor, nervousness, activation of kory or hypomania, increased fragility fracture risk, hyponatremia, ocular effects, withdrawal syndrome following abrupt discontinuation, serotonin syndrome, hypersensitivity reaction, and activation of suicidal ideation and behavior.  Pt educated on the need to practice safe sex while taking this med. Discussed the need for pt to immediately call the office for any new or worsening symptoms, such as worsening depression; feeling nervous or restless; suicidal thoughts or actions; or other changes changes in mood or behavior, and all other concerns. Pt educated on med compliance and the risks of suddenly stopping this medication or missing doses. Pt verbalized understanding and is agreeable to taking Fluoxetine. Addressed all questions and concerns.     Discussed all risks, benefits, alternatives, and side effects of Hydroxyzine including but not limited to dry mouth, sedation, tremor, respiratory depression, acute generalized exanthematous pustulosis, CNS depression, drowsiness, cognitive dysfunction, dizziness, falls risk, prolonged QT interval, torsades de pointes, hallucination, involuntary body movements, seizure, and headache. Pt  denies known h/o prolonged QT interval. Pt educated on the need to practice safe sex while taking this med. Discussed the need for pt to immediately call the office for any new or worsening symptoms, such as changes changes in mood or behavior, and all other concerns. Pt verbalized understanding and is agreeable to taking Hydroxyzine. Addressed all questions and concerns.       Follow up:   F/u in 2 weeks.      TREATMENT PLAN/GOALS: Continue supportive psychotherapy efforts and medications as indicated. Treatment and medication options discussed during today's visit. Patient ackowledged and verbally consented to continue with current treatment plan and was educated on the importance of compliance with treatment and follow-up appointments.    MEDICATION ISSUES:  CORRINE reviewed as expected.  Discussed medication options and treatment plan of prescribed medication as well as the risks, benefits, and side effects including potential falls, possible impaired driving and metabolic adversities among others. Patient is agreeable to call the office with any worsening of symptoms or onset of side effects. Patient is agreeable to call 911 or go to the nearest ER should he/she begin having SI/HI. No medication side effects or related complaints today.            This document has been electronically signed by Melly Giang PA-C  February 8, 2024 10:45 EST      Part of this note may be an electronic transcription/translation of spoken language to printed text using the Dragon Dictation System.

## 2024-02-09 ENCOUNTER — TELEPHONE (OUTPATIENT)
Dept: PSYCHIATRY | Facility: CLINIC | Age: 34
End: 2024-02-09
Payer: COMMERCIAL

## 2024-02-10 DIAGNOSIS — R60.9 PERIPHERAL EDEMA: ICD-10-CM

## 2024-02-12 RX ORDER — HYDRALAZINE HYDROCHLORIDE 50 MG/1
TABLET, FILM COATED ORAL
Qty: 90 TABLET | Refills: 0 | OUTPATIENT
Start: 2024-02-12

## 2024-02-27 ENCOUNTER — TELEMEDICINE (OUTPATIENT)
Dept: PSYCHIATRY | Facility: CLINIC | Age: 34
End: 2024-02-27
Payer: COMMERCIAL

## 2024-02-27 ENCOUNTER — TELEMEDICINE (OUTPATIENT)
Dept: BEHAVIORAL HEALTH | Facility: CLINIC | Age: 34
End: 2024-02-27
Payer: COMMERCIAL

## 2024-02-27 DIAGNOSIS — G47.00 INSOMNIA, UNSPECIFIED TYPE: ICD-10-CM

## 2024-02-27 DIAGNOSIS — F51.5 NIGHTMARES: ICD-10-CM

## 2024-02-27 DIAGNOSIS — F20.89 SCHIZOPHRENIA, SIMPLE, CHRONIC: Primary | ICD-10-CM

## 2024-02-27 DIAGNOSIS — F41.1 GENERALIZED ANXIETY DISORDER: ICD-10-CM

## 2024-02-27 DIAGNOSIS — F33.9 EPISODE OF RECURRENT MAJOR DEPRESSIVE DISORDER, UNSPECIFIED DEPRESSION EPISODE SEVERITY: Primary | ICD-10-CM

## 2024-02-27 DIAGNOSIS — F20.0 SCHIZOPHRENIA, PARANOID TYPE: ICD-10-CM

## 2024-02-27 DIAGNOSIS — F41.1 GAD (GENERALIZED ANXIETY DISORDER): ICD-10-CM

## 2024-02-27 RX ORDER — FLUOXETINE HYDROCHLORIDE 40 MG/1
40 CAPSULE ORAL EVERY MORNING
Qty: 90 CAPSULE | Refills: 0 | Status: SHIPPED | OUTPATIENT
Start: 2024-02-27 | End: 2024-05-27

## 2024-02-27 RX ORDER — FLECAINIDE ACETATE 50 MG/1
1 TABLET ORAL EVERY 12 HOURS SCHEDULED
COMMUNITY
Start: 2024-02-13

## 2024-02-27 RX ORDER — PALIPERIDONE 6 MG/1
6 TABLET, EXTENDED RELEASE ORAL EVERY MORNING
Qty: 30 TABLET | Refills: 1 | Status: SHIPPED | OUTPATIENT
Start: 2024-02-27 | End: 2024-03-28

## 2024-02-27 RX ORDER — FLUOXETINE HYDROCHLORIDE 20 MG/1
20 CAPSULE ORAL DAILY
Qty: 90 CAPSULE | Refills: 0 | Status: SHIPPED | OUTPATIENT
Start: 2024-02-27 | End: 2024-05-27

## 2024-02-27 NOTE — PSYCHOTHERAPY NOTE
Doing ok,    Not smoking   Will have skin removed once in high 200;s weight   I had just quit caring    ate out boredom     Get out more, helping little brother,  throwing stuff away,     Found dentist in [place]   Bad teeth, rot from inside out     Lost some interest in video games, - want to get back to playing Magic,  cards,   Brother says its a waste of time,    pay 500 rent phone, [employer], tiny house payment,  asking for receipt,  had food stamps, mother messed that up,  it added up to 130 month,   you should ask for it.  Younger brother moving out,  may move in with my dads , I may have to put it in my dads hand , id be able to walk places   Calls me names

## 2024-02-27 NOTE — PROGRESS NOTES
Progress Note    Date: 02/27/2024  Time In: 10:00am   Time Out: 10:53am     Patient Legal Name: Anthony Tay  Patient Age: 33 y.o.    CHIEF COMPLAINT: mood     Subjective   History of Present Illness   :Mode of visit: Video  Location of provider: [address], [place] [address]  Location of patient: home     Patient is being seen via telehealth (through [facility]) and patient confirms that they are in a secure environment for this session. The patient's condition being diagnosed/treated is appropriate for telemedicine. The provider identified herself as well as her credentials. The patient gave consent to be seen remotely, and when consent is given they understand that the consent allows for patient identifiable information to be sent to a third party as needed. They may refuse to be seen remotely at any time. The electronic data is encrypted and password protected, and the patient has been advised of the potential risks to privacy not withstanding such measures. Patient consented to the use of video for the purpose of a telehealth session today. The visit included audio and video interaction. No technical issues occurred during this visit.  Anthony returns for ongoing treatment.  Goals from our previous session 02/07/24 Continue to engage in activities that promote health and wellness.     Assessment    Mental Status Exam     Appearance: good hygiene and dressed appropriately for the weather  Behavior: calm  Cooperation:  engaged, cooperative, attentive, and friendly  Eye Contact:  good  Affect:  bright and congruent  Mood: expressive  Speech: responsive and talkative  Thought Process:  organized and goal-oriented  Thought Content: appropriate  Suicidal: denies  Homicidal:  denies  Hallucinations:  denies  Memory:  intact  Orientation:  person, place, time, and situation  Reliability:  reliable  Insight:  good  Judgment:  good     Clinical Intervention       ICD-10-CM ICD-9-CM    1. Episode of recurrent major depressive disorder, unspecified depression episode severity  F33.9 296.30   2. RACHEL (generalized anxiety disorder)  F41.1 300.02            Anthony is a 33 y.o. male who presents today as a follow-up for continued psychotherapy. Individual psychotherapy was provided utilizing solution focused  techniques to provide symptom relief, encourage expression of thoughts and feelings, discuss values and strengths, manage stress, identify triggers, recognize patterns of behavior, assess symptoms, challenge negative thinking patterns, provide support, and discuss interpersonal conflicts.  Anthony continues to do well, he is not smoking and focused on healthy lifestyle choices. He continues to strive to lose more weight.  He is medication and treatment compliance saw his provider BRYNN ALANIS today.   Discussed challenges within relationships and finances.  Discussed how patient can advocate for himself.     Plan   Plan & Goals     Advocate for himself in relationships  Continue to focus on health and wellness, recognize progress to improve confidence and self esteem     Patient acknowledged and verbally consented to continue working toward resolving current treatment plan goals and was educated on the importance of participation in the therapeutic process.  Patient will remain compliant with medication regimen as prescribed. Discuss any medication side effects, questions or concerns with prescribing provider.  Call 911 or present to the nearest emergency room in an emergency situation.  National Suicide Prevention Lifeline: Call 988. The Lifeline provides 24/7, free and confidential support for people in distress, prevention and crisis resources.  Crisis Text Line  Text HOME To 891911    No follow-ups on file.    ____________________  This document has been electronically signed by Madison Yeager LCSW  February 27, 2024 10:58 EST    Part of this note may be an electronic  transcription/translation of spoken language to printed text using the Dragon Dictation System.

## 2024-02-27 NOTE — PROGRESS NOTES
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 By: MACKENZIE HOOPER JR, MD on 4/10/2023 at 0:39                  Assessment & Plan   Diagnoses and all orders for this visit:    1. Schizophrenia, simple, chronic (Primary)  -     FLUoxetine (PROzac) 40 MG capsule; Take 1 capsule by mouth Every Morning for 90 days. Take with 20mg cap for total dose of 60mg  Dispense: 90 capsule; Refill: 0    2. Generalized anxiety disorder  -     FLUoxetine (PROzac) 40 MG capsule; Take 1 capsule by mouth Every Morning for 90 days. Take with 20mg cap for total dose of 60mg  Dispense: 90 capsule; Refill: 0  -     FLUoxetine (PROzac) 20 MG capsule; Take 1 capsule by mouth Daily for 90 days. Take with 40mg for total dose of 60mg.  Dispense: 90 capsule; Refill: 0    3. Schizophrenia, paranoid type  -     paliperidone (Invega) 6 MG 24 hr tablet; Take 1 tablet by mouth Every Morning for 30 days.  Dispense: 30 tablet; Refill: 1    4. Insomnia, unspecified type    5. Nightmares            Patient screened positive for depression based on a PHQ-9 score of 12 on 11/21/2023. Follow-up recommendations include: Prescribed antidepressant medication treatment, Suicide Risk Assessment performed, and see assessment below .    Presentation seems to be most consistent with schizophrenia, RACHEL, insomnia, nightmares.  We will continue Invega  for management of schizophrenia and overall mood.  Patient has been on this med for about 2 weeks.  We will continue Prozac back 60 mg for management of anxiety and overall mood. We will continue gabapentin for management of insomnia, nightmares, and overall mood.  We will continue hydroxyzine for anxiety as needed.  Recommended for patient to take 3 tablets as he has only some improvement with taking 2 tablets.  Instructed pt to contact the office for any new or worsening symptoms or any other concerns. Follow-up in 1 month.  Continue therapy with Madison. Addressed all questions and concerns.    Visit Diagnoses:    ICD-10-CM ICD-9-CM   1. Schizophrenia, simple, chronic  F20.89 295.02   2. Generalized anxiety disorder  F41.1 300.02   3. Schizophrenia, paranoid type  F20.0 295.30   4. Insomnia, unspecified type  G47.00 780.52   5. Nightmares  F51.5 307.47             PLAN:  Safety: No acute safety concerns at this time.  Therapy: Continue therapy with Madison.  Risk Assessment: Risk of self-harm acutely is moderate.  Risk factors include anxiety disorder, mood disorder, family history, h/o suicide attempt in the past, and recent psychosocial stressors (pandemic). Protective factors include denies access to guns/weapons, no present SI, no history of self-harm in the past, minimal AODA, healthcare seeking, future orientation, willingness to engage in care.  Risk of self-harm chronically is also moderate, but could be further elevated in the event of treatment noncompliance and/or AODA.  Labs/Diagnostics Ordered:   No orders of the defined types were placed in this encounter.    Medications:   New Medications Ordered This Visit   Medications    FLUoxetine (PROzac) 40 MG capsule     Sig: Take 1 capsule by mouth Every Morning for 90 days. Take with 20mg cap for total dose of 60mg     Dispense:  90 capsule     Refill:  0    FLUoxetine (PROzac) 20 MG capsule     Sig: Take 1 capsule by mouth Daily for 90 days. Take with 40mg for  total dose of 60mg.     Dispense:  90 capsule     Refill:  0    paliperidone (Invega) 6 MG 24 hr tablet     Sig: Take 1 tablet by mouth Every Morning for 30 days.     Dispense:  30 tablet     Refill:  1       Discussed all risks, benefits, alternatives, and side effects of Gabapentin including but not limited to sedation, dizziness, GI upset, dry mouth, and weight gain. Pt instructed to avoid driving and doing other tasks or actions that require to be alert until knowing how the drug affects them.  Pt educated on the need to practice safe sex while taking this med. Discussed the need for pt to immediately call the office for any new or worsening symptoms, such as worsening depression; feeling nervous or restless; suicidal thoughts or actions; or other changes changes in mood or behavior, and all other concerns. Pt educated on med compliance and the risks of suddenly stopping this medication or missing doses. Pt verbalized understanding and is agreeable to taking Gabapentin. Addressed all questions and concerns.  Will order UDS and obtain CORRINE report. Pt verbally signed controlled substances agreement.    Invega, Risks, benefits, alternatives discussed with patient including nausea and vomiting, GI upset, sedation, akathisia, theoretical risk of tardive dyskinesia, and weight gain. Use care when operating vehicle, vessel, or machine. After discussion of these risks and benefits, the patient voiced understanding and agreed to proceed.    Discussed all risks, benefits, alternatives, and side effects of Fluoxetine including but not limited to GI upset, decreased appetite, sexual dysfunction, bleeding risk, seizure risk, insomnia, anxiety, drowsiness, headache, asthenia, tremor, nervousness, activation of kory or hypomania, increased fragility fracture risk, hyponatremia, ocular effects, withdrawal syndrome following abrupt discontinuation, serotonin syndrome, hypersensitivity reaction, and activation of suicidal  ideation and behavior.  Pt educated on the need to practice safe sex while taking this med. Discussed the need for pt to immediately call the office for any new or worsening symptoms, such as worsening depression; feeling nervous or restless; suicidal thoughts or actions; or other changes changes in mood or behavior, and all other concerns. Pt educated on med compliance and the risks of suddenly stopping this medication or missing doses. Pt verbalized understanding and is agreeable to taking Fluoxetine. Addressed all questions and concerns.     Discussed all risks, benefits, alternatives, and side effects of Hydroxyzine including but not limited to dry mouth, sedation, tremor, respiratory depression, acute generalized exanthematous pustulosis, CNS depression, drowsiness, cognitive dysfunction, dizziness, falls risk, prolonged QT interval, torsades de pointes, hallucination, involuntary body movements, seizure, and headache. Pt denies known h/o prolonged QT interval. Pt educated on the need to practice safe sex while taking this med. Discussed the need for pt to immediately call the office for any new or worsening symptoms, such as changes changes in mood or behavior, and all other concerns. Pt verbalized understanding and is agreeable to taking Hydroxyzine. Addressed all questions and concerns.       Follow up:   F/u in 1 month.      TREATMENT PLAN/GOALS: Continue supportive psychotherapy efforts and medications as indicated. Treatment and medication options discussed during today's visit. Patient ackowledged and verbally consented to continue with current treatment plan and was educated on the importance of compliance with treatment and follow-up appointments.    MEDICATION ISSUES:  CORRINE reviewed as expected.  Discussed medication options and treatment plan of prescribed medication as well as the risks, benefits, and side effects including potential falls, possible impaired driving and metabolic adversities among  others. Patient is agreeable to call the office with any worsening of symptoms or onset of side effects. Patient is agreeable to call 911 or go to the nearest ER should he/she begin having SI/HI. No medication side effects or related complaints today.            This document has been electronically signed by Melly Giang PA-C  February 27, 2024 12:50 EST      Part of this note may be an electronic transcription/translation of spoken language to printed text using the Dragon Dictation System.

## 2024-03-08 NOTE — PROGRESS NOTES
Progress Note    Date: 03/11/2024  Time In: 09:00am   Time Out: 09:53am    Patient Legal Name: Anthony Tay  Patient Age: 33 y.o.    CHIEF COMPLAINT: Mood     Subjective   History of Present Illness   Mode of visit: Video  Location of provider: [address], [place] [address]  Location of patient: home     Patient [facility] and patient confirms that they are in a secure environment for this session. The patient's condition being diagnosed/treated is appropriate for telemedicine. The provider identified herself as well as her credentials. The patient gave consent to be seen remotely, and when consent is given they understand that the consent allows for patient identifiable information to be sent to a third party as needed. They may refuse to be seen remotely at any time. The electronic data is encrypted and password protected, and the patient has been advised of the potential risks to privacy not withstanding such measures. Patient consented to the use of video for the purpose of a telehealth session today. The visit included audio and video interaction. No technical issues occurred during this visit.  Alfonzo returns today for ongoing treatment.   Goals from our previous session on 02/27/24  dvocate for himself in relationships  Continue to focus on health and wellness, recognize progress to improve confidence and self esteem     Assessment    Mental Status Exam     Appearance: good hygiene and dressed appropriately for the weather  Behavior: calm  Cooperation:  engaged, cooperative, attentive, and friendly  Eye Contact:  good  Affect:  bright and congruent  Mood: expressive  Speech: responsive and talkative  Thought Process:  organized and goal-oriented  Thought Content: appropriate  Suicidal: denies  Homicidal:  denies  Hallucinations:  denies  Memory:  intact  Orientation:  person, place, time, and situation  Reliability:   reliable  Insight:  good  Judgment:  good     Clinical Intervention       ICD-10-CM ICD-9-CM   1. Recurrent major depressive disorder, remission status unspecified  F33.9 296.30   2. RACHEL (generalized anxiety disorder)  F41.1 300.02      Anthony is a 33 y.o. male who presents today as a follow-up for continued psychotherapy. Individual psychotherapy was provided utilizing solution focused  techniques to build rapport, support self-esteem, manage stress, identify triggers, identify strengths, assess symptoms, provide support, and discuss interpersonal conflicts. He has had some communication with family that indicates that he is advocating for himself   Discussed that even if others dont change, this is a good practice to boost confidence.  He would like to start dating, we discussed that process.  He is having some daytime somnolence that he will address with provider.      Plan   Plan & Goals     Continue to advocate for self   Psychotherapy for symptom relief      Patient acknowledged and verbally consented to continue working toward resolving current treatment plan goals and was educated on the importance of participation in the therapeutic process.  Patient will remain compliant with medication regimen as prescribed. Discuss any medication side effects, questions or concerns with prescribing provider.  Call 911 or present to the nearest emergency room in an emergency situation.  National Suicide Prevention Lifeline: Call 988. The Lifeline provides 24/7, free and confidential support for people in distress, prevention and crisis resources.  Crisis Text Line  Text HOME To 734470    Return in about 3 weeks (around 4/1/2024).    ____________________  This document has been electronically signed by Madison Yeager LCSW  March 11, 2024 09:58 EDT    Part of this note may be an electronic transcription/translation of spoken language to printed text using the Dragon Dictation System.

## 2024-03-11 ENCOUNTER — TELEMEDICINE (OUTPATIENT)
Dept: PSYCHIATRY | Facility: CLINIC | Age: 34
End: 2024-03-11
Payer: COMMERCIAL

## 2024-03-11 DIAGNOSIS — F41.1 GAD (GENERALIZED ANXIETY DISORDER): ICD-10-CM

## 2024-03-11 DIAGNOSIS — F33.9 RECURRENT MAJOR DEPRESSIVE DISORDER, REMISSION STATUS UNSPECIFIED: Primary | ICD-10-CM

## 2024-03-11 NOTE — PSYCHOTHERAPY NOTE
One of my meds is knocking me out in the am - sometimes go back to sleep until 5 am   Invega .... Or B/P meds?  Helping little brother  Taking up for myself more  Nephews gf told me I need to start dating again -  he said they are  not allowed,  Luci and karen 18 yo    I walked away    Like my older nephew Seferino Dejesus, not as much, he thinks he is smarter than everyone else   1 or 2 dating apps,  figuring it out,   talking to one or two people     Will get water hooked up,  have to go to brothers to do BR stuff,  trying to distance myself   Other little brother still with dad,    Subject of money- not smoking, I would like some pocket money,  they did not want to talk about it,   $130 groceries instead cigarettes     I confronted markel,  she said I get 840,  I get 943,   vent to my sister    When my grandma-  , everything changed,      If I move,  my younger sister will take over my money ,   [facility] [employer],  will call         I Feel better self ,   wieght   Relationship history next  time - we will talk about any red flags etc

## 2024-03-16 DIAGNOSIS — I10 ESSENTIAL HYPERTENSION: ICD-10-CM

## 2024-03-19 RX ORDER — CLONIDINE HYDROCHLORIDE 0.1 MG/1
0.1 TABLET ORAL 2 TIMES DAILY
Qty: 60 TABLET | Refills: 0 | Status: SHIPPED | OUTPATIENT
Start: 2024-03-19

## 2024-03-25 ENCOUNTER — TELEMEDICINE (OUTPATIENT)
Dept: PSYCHIATRY | Facility: CLINIC | Age: 34
End: 2024-03-25
Payer: COMMERCIAL

## 2024-03-25 DIAGNOSIS — F33.9 EPISODE OF RECURRENT MAJOR DEPRESSIVE DISORDER, UNSPECIFIED DEPRESSION EPISODE SEVERITY: Primary | ICD-10-CM

## 2024-03-25 DIAGNOSIS — F41.1 GAD (GENERALIZED ANXIETY DISORDER): ICD-10-CM

## 2024-03-25 NOTE — PROGRESS NOTES
Progress Note    Date: 03/25/2024  Time In: 09:01  Time Out: 09:55    Patient Legal Name: Anthony Tay  Patient Age: 33 y.o.    CHIEF COMPLAINT: Mood     Subjective   History of Present Illness   Mode of visit: Video  Location of provider: [address], [place] [address]  Location of patient: Home     Patient is being seen via telehealth (through [email or web address]) and patient confirms that they are in a secure environment for this session. The patient's condition being diagnosed/treated is appropriate for telemedicine. The provider identified herself as well as her credentials. The patient gave consent to be seen remotely, and when consent is given they understand that the consent allows for patient identifiable information to be sent to a third party as needed. They may refuse to be seen remotely at any time. The electronic data is encrypted and password protected, and the patient has been advised of the potential risks to privacy not withstanding such measures. Patient consented to the use of video for the purpose of a telehealth session today. The visit included audio and video interaction. No technical issues occurred during this visit. Anthony returns today for ongoing treatment.  Goals from our most recent session on 03/11/24 were:   Continue to advocate for self   Psychotherapy for symptom relief    Assessment    Mental Status Exam     Appearance: good hygiene and dressed appropriately for the weather  Behavior: calm  Cooperation:  engaged, cooperative, attentive, and friendly  Eye Contact:  good  Affect:  congruent  Mood: expressive  Speech: responsive  Thought Process:  organized and goal-oriented  Thought Content: appropriate  Suicidal: denies  Homicidal:  denies  Hallucinations:  denies  Memory:  intact  Orientation:  person, place, time, and situation  Reliability:  reliable  Insight:  good  Judgment:  good     Clinical Intervention       ICD-10-CM ICD-9-CM   1. Episode of  recurrent major depressive disorder, unspecified depression episode severity  F33.9 296.30   2. RACHEL (generalized anxiety disorder)  F41.1 300.02        Anthony is a 33 y.o. male who presents today as a follow-up for continued psychotherapy. Individual psychotherapy was provided utilizing solution focused  techniques to promote problem-solving, provide symptom relief, encourage expression of thoughts and feelings, manage stress, identify triggers, recognize patterns of behavior, assess symptoms, challenge negative thinking patterns, provide support, and discuss interpersonal conflicts.  Depressive symptoms seem to be related to external factors that are  contributing stressors   He reports not sleeping well  last night, has a lot on his mind in regard to his relationships. Talking to friends over his online game helps    Discussed efforts to recognize that change will alleviate stressors and contribute to a sense of control and mastery in his life.       Plan   Plan & Goals     Take steps to limit stressors and focus on becoming more independent   Supportive therapy for symptom relief     Patient acknowledged and verbally consented to continue working toward resolving current treatment plan goals and was educated on the importance of participation in the therapeutic process.  Patient will remain compliant with medication regimen as prescribed. Discuss any medication side effects, questions or concerns with prescribing provider.  Call 911 or present to the nearest emergency room in an emergency situation.  National Suicide Prevention Lifeline: Call 988. The Lifeline provides 24/7, free and confidential support for people in distress, prevention and crisis resources.  Crisis Text Line  Text HOME To 280029    Return in about 2 weeks (around 4/8/2024).    ____________________  This document has been electronically signed by Madison Yeager LCSW  March 25, 2024 09:56 EDT    Part of this note may be an electronic  transcription/translation of spoken language to printed text using the Dragon Dictation System.    Frustrated    He is trying to get me into [place]   He filled out papers    Crystal lied about how much money I got?   I'm so frustrated     I can't say no to anything, he will throw the     Social Security , - I have no idea about my money  My Uncle- he's going to leave everything to my brother, waiting till he dies   Threatened to hide a camera in my place   Did not sleep last night   Yelled at me, [place]  - I can't get it but you can    Not allowed to date  My dad has some pull there- he works for the city   Ill move my money - sister   I want nicer clothes,   he wont hear of it,     Told family I was hobbs about 5 years ago,  parents ok, Sulaiman hard time,  sister, so-so   I can't be Me around him,    Had a nice collection, Sulaiman got the money   I want a [employer] card  You [employer]    Want to go to [employer], get a small cake,   chocolate cake    Everyone deserves a cake   I want some pocket money     Still not smoking

## 2024-04-02 ENCOUNTER — TELEMEDICINE (OUTPATIENT)
Dept: BEHAVIORAL HEALTH | Facility: CLINIC | Age: 34
End: 2024-04-02
Payer: COMMERCIAL

## 2024-04-02 DIAGNOSIS — F99 INSOMNIA DUE TO OTHER MENTAL DISORDER: ICD-10-CM

## 2024-04-02 DIAGNOSIS — G47.00 INSOMNIA, UNSPECIFIED TYPE: ICD-10-CM

## 2024-04-02 DIAGNOSIS — F51.05 INSOMNIA DUE TO OTHER MENTAL DISORDER: ICD-10-CM

## 2024-04-02 DIAGNOSIS — F41.1 GENERALIZED ANXIETY DISORDER: ICD-10-CM

## 2024-04-02 DIAGNOSIS — F51.5 NIGHTMARES: ICD-10-CM

## 2024-04-02 DIAGNOSIS — F20.0 SCHIZOPHRENIA, PARANOID TYPE: Primary | ICD-10-CM

## 2024-04-02 RX ORDER — HYDROXYZINE HYDROCHLORIDE 25 MG/1
TABLET, FILM COATED ORAL
Qty: 60 TABLET | Refills: 0 | Status: SHIPPED | OUTPATIENT
Start: 2024-04-02

## 2024-04-02 RX ORDER — PALIPERIDONE 9 MG/1
9 TABLET, EXTENDED RELEASE ORAL EVERY MORNING
Qty: 30 TABLET | Refills: 1 | Status: SHIPPED | OUTPATIENT
Start: 2024-04-02

## 2024-04-02 RX ORDER — GABAPENTIN 600 MG/1
TABLET ORAL
Qty: 90 TABLET | Refills: 0 | Status: SHIPPED | OUTPATIENT
Start: 2024-04-02

## 2024-04-02 NOTE — PROGRESS NOTES
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 pneumoperitoneum or pneumatosis.  No portal or mesenteric venous gas is seen to suggest ischemic enterocolitis.   2. There are jejunal and colonic diverticula without definite acute diverticulitis.   3. No focal extraluminal intraperitoneal or retroperitoneal fluid collections are seen to suggest abscess, ascites, or acute hemorrhage.   4. No acute appendicitis.   5. There is new diffuse hepatic steatosis with hepatomegaly.  Probably no splenomegaly.   6. No acute pancreatitis.  No gallstones or acute cholecystitis.   7. No renal stones.  No ureterolithiasis.  No obstructive uropathy or hydronephrosis.   8. No other acute findings are seen.   9. Coronary artery calcifications are noted; consider Cardiology consultation.   10. Please see above comments for further detail.    Please note that portions of this note were completed with a voice recognition program.  MACKENZIE HOOPER JR, MD       Electronically Signed and Approved By: MACKENZIE HOOPER JR, MD on 4/10/2023 at 0:39                  Assessment & Plan   Diagnoses and all orders for this visit:    1. Schizophrenia, paranoid type (Primary)  -     paliperidone (INVEGA) 9 MG 24 hr tablet; Take 1 tablet by mouth Every Morning.  Dispense: 30 tablet; Refill: 1    2. Generalized anxiety disorder  -     gabapentin (Neurontin) 600 MG tablet; Take 1 tab PO QHS for sleep  Dispense: 90  tablet; Refill: 0  -     hydrOXYzine (ATARAX) 25 MG tablet; TAKE 1 TO 2 TABLETS BY MOUTH ONCE DAILY AS NEEDED SEVERE  ANXIETY  Dispense: 60 tablet; Refill: 0    3. Nightmares  -     gabapentin (Neurontin) 600 MG tablet; Take 1 tab PO QHS for sleep  Dispense: 90 tablet; Refill: 0    4. Insomnia, unspecified type    5. Insomnia due to other mental disorder  -     gabapentin (Neurontin) 600 MG tablet; Take 1 tab PO QHS for sleep  Dispense: 90 tablet; Refill: 0        Patient screened positive for depression based on a PHQ-9 score of 18 on 4/2/2024. Follow-up recommendations include: Prescribed antidepressant medication treatment, Suicide Risk Assessment performed, and see assessment below .    Presentation seems to be most consistent with schizophrenia, RACHEL, insomnia, nightmares.  We will increase Invega for management of schizophrenia and overall mood.   We will continue Prozac for management of anxiety and overall mood. We will continue gabapentin for management of insomnia, nightmares, and overall mood.  We will continue hydroxyzine for anxiety as needed.  Recommended for patient to take 3 tablets as he has only some improvement with taking 2 tablets.  Instructed pt to contact the office for any new or worsening symptoms or any other concerns. Follow-up in 1 month.  Continue therapy with [facility]. Addressed all questions and concerns.    Visit Diagnoses:    ICD-10-CM ICD-9-CM   1. Schizophrenia, paranoid type  F20.0 295.30   2. Generalized anxiety disorder  F41.1 300.02   3. Nightmares  F51.5 307.47   4. Insomnia, unspecified type  G47.00 780.52   5. Insomnia due to other mental disorder  F51.05 300.9    F99 327.02               PLAN:  Safety: No acute safety concerns at this time.  Therapy: Continue therapy with [facility].  Risk Assessment: Risk of self-harm acutely is moderate.  Risk factors include anxiety disorder, mood disorder, family history, h/o suicide attempt in the past, and recent psychosocial stressors  (pandemic). Protective factors include denies access to guns/weapons, no present SI, no history of self-harm in the past, minimal AODA, healthcare seeking, future orientation, willingness to engage in care.  Risk of self-harm chronically is also moderate, but could be further elevated in the event of treatment noncompliance and/or AODA.  Labs/Diagnostics Ordered:   No orders of the defined types were placed in this encounter.    Medications:   New Medications Ordered This Visit   Medications    paliperidone (INVEGA) 9 MG 24 hr tablet     Sig: Take 1 tablet by mouth Every Morning.     Dispense:  30 tablet     Refill:  1    gabapentin (Neurontin) 600 MG tablet     Sig: Take 1 tab PO QHS for sleep     Dispense:  90 tablet     Refill:  0    hydrOXYzine (ATARAX) 25 MG tablet     Sig: TAKE 1 TO 2 TABLETS BY MOUTH ONCE DAILY AS NEEDED SEVERE  ANXIETY     Dispense:  60 tablet     Refill:  0       Discussed all risks, benefits, alternatives, and side effects of Gabapentin including but not limited to sedation, dizziness, GI upset, dry mouth, and weight gain. Pt instructed to avoid driving and doing other tasks or actions that require to be alert until knowing how the drug affects them.  Pt educated on the need to practice safe sex while taking this med. Discussed the need for pt to immediately call the office for any new or worsening symptoms, such as worsening depression; feeling nervous or restless; suicidal thoughts or actions; or other changes changes in mood or behavior, and all other concerns. Pt educated on med compliance and the risks of suddenly stopping this medication or missing doses. Pt verbalized understanding and is agreeable to taking Gabapentin. Addressed all questions and concerns.  Will order UDS and obtain CORRINE report. Pt verbally signed controlled substances agreement.    Invega, Risks, benefits, alternatives discussed with patient including nausea and vomiting, GI upset, sedation, akathisia,  theoretical risk of tardive dyskinesia, and weight gain. Use care when operating vehicle, vessel, or machine. After discussion of these risks and benefits, the patient voiced understanding and agreed to proceed.    Discussed all risks, benefits, alternatives, and side effects of Fluoxetine including but not limited to GI upset, decreased appetite, sexual dysfunction, bleeding risk, seizure risk, insomnia, anxiety, drowsiness, headache, asthenia, tremor, nervousness, activation of kory or hypomania, increased fragility fracture risk, hyponatremia, ocular effects, withdrawal syndrome following abrupt discontinuation, serotonin syndrome, hypersensitivity reaction, and activation of suicidal ideation and behavior.  Pt educated on the need to practice safe sex while taking this med. Discussed the need for pt to immediately call the office for any new or worsening symptoms, such as worsening depression; feeling nervous or restless; suicidal thoughts or actions; or other changes changes in mood or behavior, and all other concerns. Pt educated on med compliance and the risks of suddenly stopping this medication or missing doses. Pt verbalized understanding and is agreeable to taking Fluoxetine. Addressed all questions and concerns.     Discussed all risks, benefits, alternatives, and side effects of Hydroxyzine including but not limited to dry mouth, sedation, tremor, respiratory depression, acute generalized exanthematous pustulosis, CNS depression, drowsiness, cognitive dysfunction, dizziness, falls risk, prolonged QT interval, torsades de pointes, hallucination, involuntary body movements, seizure, and headache. Pt denies known h/o prolonged QT interval. Pt educated on the need to practice safe sex while taking this med. Discussed the need for pt to immediately call the office for any new or worsening symptoms, such as changes changes in mood or behavior, and all other concerns. Pt verbalized understanding and is  agreeable to taking Hydroxyzine. Addressed all questions and concerns.       Follow up:   F/u in 1 month.      TREATMENT PLAN/GOALS: Continue supportive psychotherapy efforts and medications as indicated. Treatment and medication options discussed during today's visit. Patient ackowledged and verbally consented to continue with current treatment plan and was educated on the importance of compliance with treatment and follow-up appointments.    MEDICATION ISSUES:  CORRINE reviewed as expected.  Discussed medication options and treatment plan of prescribed medication as well as the risks, benefits, and side effects including potential falls, possible impaired driving and metabolic adversities among others. Patient is agreeable to call the office with any worsening of symptoms or onset of side effects. Patient is agreeable to call 911 or go to the nearest ER should he/she begin having SI/HI. No medication side effects or related complaints today.            This document has been electronically signed by Melly Giang PA-C  April 2, 2024 10:42 EDT      Part of this note may be an electronic transcription/translation of spoken language to printed text using the Dragon Dictation System.

## 2024-04-23 ENCOUNTER — PATIENT MESSAGE (OUTPATIENT)
Dept: FAMILY MEDICINE CLINIC | Facility: CLINIC | Age: 34
End: 2024-04-23
Payer: COMMERCIAL

## 2024-04-24 NOTE — TELEPHONE ENCOUNTER
From: Anthony Tay  To: Hillary Worthington  Sent: 4/23/2024 6:34 PM EDT  Subject: Rectal Bleeding     I’m having issues with bleeding from my rectum, it’s been pretty bad the last few days

## 2024-05-10 ENCOUNTER — TELEMEDICINE (OUTPATIENT)
Dept: PSYCHIATRY | Facility: CLINIC | Age: 34
End: 2024-05-10
Payer: COMMERCIAL

## 2024-05-10 DIAGNOSIS — F41.1 GAD (GENERALIZED ANXIETY DISORDER): ICD-10-CM

## 2024-05-10 DIAGNOSIS — F33.9 RECURRENT MAJOR DEPRESSIVE DISORDER, REMISSION STATUS UNSPECIFIED: Primary | ICD-10-CM

## 2024-05-12 DIAGNOSIS — M25.50 ARTHRALGIA, UNSPECIFIED JOINT: ICD-10-CM

## 2024-05-13 RX ORDER — DICLOFENAC SODIUM 75 MG/1
75 TABLET, DELAYED RELEASE ORAL 2 TIMES DAILY
Qty: 60 TABLET | Refills: 0 | Status: SHIPPED | OUTPATIENT
Start: 2024-05-13

## 2024-05-14 ENCOUNTER — TELEMEDICINE (OUTPATIENT)
Dept: BEHAVIORAL HEALTH | Facility: CLINIC | Age: 34
End: 2024-05-14
Payer: COMMERCIAL

## 2024-05-14 DIAGNOSIS — F51.05 INSOMNIA DUE TO OTHER MENTAL DISORDER: ICD-10-CM

## 2024-05-14 DIAGNOSIS — F41.1 GENERALIZED ANXIETY DISORDER: ICD-10-CM

## 2024-05-14 DIAGNOSIS — F99 INSOMNIA DUE TO OTHER MENTAL DISORDER: ICD-10-CM

## 2024-05-14 DIAGNOSIS — F51.5 NIGHTMARES: ICD-10-CM

## 2024-05-14 DIAGNOSIS — F20.0 SCHIZOPHRENIA, PARANOID TYPE: Primary | ICD-10-CM

## 2024-05-14 PROCEDURE — 1160F RVW MEDS BY RX/DR IN RCRD: CPT | Performed by: PHYSICIAN ASSISTANT

## 2024-05-14 PROCEDURE — 1159F MED LIST DOCD IN RCRD: CPT | Performed by: PHYSICIAN ASSISTANT

## 2024-05-14 PROCEDURE — 99214 OFFICE O/P EST MOD 30 MIN: CPT | Performed by: PHYSICIAN ASSISTANT

## 2024-05-14 RX ORDER — PALIPERIDONE 9 MG/1
9 TABLET, EXTENDED RELEASE ORAL EVERY MORNING
Qty: 30 TABLET | Refills: 1 | Status: SHIPPED | OUTPATIENT
Start: 2024-05-14

## 2024-05-14 RX ORDER — FLUOXETINE HYDROCHLORIDE 20 MG/1
20 CAPSULE ORAL DAILY
Qty: 90 CAPSULE | Refills: 0 | Status: SHIPPED | OUTPATIENT
Start: 2024-05-14 | End: 2024-08-12

## 2024-05-14 RX ORDER — HYDROXYZINE HYDROCHLORIDE 25 MG/1
TABLET, FILM COATED ORAL
Qty: 180 TABLET | Refills: 0 | Status: SHIPPED | OUTPATIENT
Start: 2024-05-14

## 2024-05-14 RX ORDER — GABAPENTIN 800 MG/1
800 TABLET ORAL NIGHTLY
Qty: 90 TABLET | Refills: 0 | Status: SHIPPED | OUTPATIENT
Start: 2024-05-14 | End: 2024-08-12

## 2024-05-14 RX ORDER — FLUOXETINE HYDROCHLORIDE 40 MG/1
40 CAPSULE ORAL EVERY MORNING
Qty: 90 CAPSULE | Refills: 0 | Status: SHIPPED | OUTPATIENT
Start: 2024-05-14 | End: 2024-08-12

## 2024-05-14 NOTE — PROGRESS NOTES
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 as changes changes in mood or behavior, and all other concerns. Pt verbalized understanding and is agreeable to taking Hydroxyzine. Addressed all questions and concerns.       Follow up:   F/u in 1 to 2 months.      TREATMENT PLAN/GOALS: Continue supportive psychotherapy efforts and medications as indicated. Treatment and medication options discussed during today's visit. Patient ackowledged and verbally consented to continue with current treatment plan and was educated on the importance of compliance with treatment and follow-up appointments.    MEDICATION ISSUES:  CORRINE reviewed as expected.  Discussed medication options and treatment plan of prescribed medication as well as the risks, benefits, and side effects including potential falls, possible impaired driving and metabolic adversities among others. Patient is agreeable to call the office with any worsening of symptoms or onset of side effects. Patient is agreeable to call 911 or go to the nearest ER should he/she begin having SI/HI. No medication side effects or related complaints today.            This document has been electronically signed by Melly Giang PA-C  May 14, 2024 08:18 EDT      Part of this note may be an electronic transcription/translation of spoken language to printed text using the Dragon Dictation System.

## 2024-06-08 DIAGNOSIS — M25.50 ARTHRALGIA, UNSPECIFIED JOINT: ICD-10-CM

## 2024-06-11 RX ORDER — DICLOFENAC SODIUM 75 MG/1
75 TABLET, DELAYED RELEASE ORAL 2 TIMES DAILY
Qty: 60 TABLET | Refills: 0 | OUTPATIENT
Start: 2024-06-11

## 2024-06-25 ENCOUNTER — OFFICE VISIT (OUTPATIENT)
Dept: FAMILY MEDICINE CLINIC | Facility: CLINIC | Age: 34
End: 2024-06-25
Payer: COMMERCIAL

## 2024-06-25 VITALS
SYSTOLIC BLOOD PRESSURE: 152 MMHG | HEART RATE: 64 BPM | BODY MASS INDEX: 37.19 KG/M2 | DIASTOLIC BLOOD PRESSURE: 100 MMHG | HEIGHT: 77 IN | WEIGHT: 315 LBS | OXYGEN SATURATION: 94 % | TEMPERATURE: 97.5 F

## 2024-06-25 DIAGNOSIS — I10 ESSENTIAL HYPERTENSION: ICD-10-CM

## 2024-06-25 DIAGNOSIS — R60.0 PERIPHERAL EDEMA: ICD-10-CM

## 2024-06-25 DIAGNOSIS — E03.9 HYPOTHYROIDISM, UNSPECIFIED TYPE: ICD-10-CM

## 2024-06-25 DIAGNOSIS — M25.50 ARTHRALGIA, UNSPECIFIED JOINT: ICD-10-CM

## 2024-06-25 PROCEDURE — 3077F SYST BP >= 140 MM HG: CPT | Performed by: NURSE PRACTITIONER

## 2024-06-25 PROCEDURE — 99214 OFFICE O/P EST MOD 30 MIN: CPT | Performed by: NURSE PRACTITIONER

## 2024-06-25 PROCEDURE — 1159F MED LIST DOCD IN RCRD: CPT | Performed by: NURSE PRACTITIONER

## 2024-06-25 PROCEDURE — 3080F DIAST BP >= 90 MM HG: CPT | Performed by: NURSE PRACTITIONER

## 2024-06-25 PROCEDURE — 1160F RVW MEDS BY RX/DR IN RCRD: CPT | Performed by: NURSE PRACTITIONER

## 2024-06-25 RX ORDER — LOSARTAN POTASSIUM 100 MG/1
100 TABLET ORAL DAILY
Qty: 90 TABLET | Refills: 0 | Status: SHIPPED | OUTPATIENT
Start: 2024-06-25

## 2024-06-25 RX ORDER — HYDRALAZINE HYDROCHLORIDE 50 MG/1
TABLET, FILM COATED ORAL
Qty: 270 TABLET | Refills: 0 | Status: SHIPPED | OUTPATIENT
Start: 2024-06-25

## 2024-06-25 RX ORDER — CLONIDINE HYDROCHLORIDE 0.2 MG/1
0.2 TABLET ORAL 2 TIMES DAILY
Qty: 180 TABLET | Refills: 0 | Status: SHIPPED | OUTPATIENT
Start: 2024-06-25

## 2024-06-25 RX ORDER — LEVOTHYROXINE SODIUM 0.05 MG/1
50 TABLET ORAL DAILY
Qty: 90 TABLET | Refills: 0 | Status: SHIPPED | OUTPATIENT
Start: 2024-06-25

## 2024-06-25 RX ORDER — LUMATEPERONE 42 MG/1
1 CAPSULE ORAL DAILY
COMMUNITY
Start: 2024-03-14

## 2024-06-25 RX ORDER — DICLOFENAC SODIUM 75 MG/1
75 TABLET, DELAYED RELEASE ORAL 2 TIMES DAILY
Qty: 180 TABLET | Refills: 0 | Status: SHIPPED | OUTPATIENT
Start: 2024-06-25

## 2024-06-25 RX ORDER — AMLODIPINE BESYLATE 10 MG/1
10 TABLET ORAL DAILY
Qty: 90 TABLET | Refills: 0 | Status: SHIPPED | OUTPATIENT
Start: 2024-06-25

## 2024-06-25 NOTE — PROGRESS NOTES
"Chief Complaint  Hypertension (Refills ) and Urinary Frequency (Frequency mainly at night)    History of Present Illness  Anthony Tay is a 34 y.o. male who presents to [facility] with a past medical history of  Past Medical History:   Diagnosis Date    Allergic rhinitis     Anxiety     Asperger's syndrome     Colon polyps 07/18/2014    Depression     GERD (gastroesophageal reflux disease) 01/23/2015    Hematuria, microscopic 03/25/2014    3/25//2014 large blood, > 300 protein u/a in office on repeat from outpt labs.    Hypertension     Microscopic hematuria 3/25/2014    Resistant hypertension 02/03/2020    Tourette's     Tourette's disorder 03/06/2014       HPI  Patient presents to the office today for follow up on chronic health conditions and obtain refills of medication. Pt forgot to fast for labs today.     HTN: elevated in office and with all visits over the past year. Pt admits to forgetting his medication at times. Weight is back up, pt notes he is moving less.     Pt states his teeth are having issues/broken but do not hurt. Pt is trying to find a dentist to take [employer].     Sees RHODA Naranjo with Psychiatry.     Pt notes increased urinary frequency especially at night.  Patient does not have a history of diabetes.    Patient also notes that he has had some fatigue but no changes in hair, skin, or nails.    Objective   Vital Signs:   Vitals:    06/25/24 0812   BP: 152/100   BP Location: Left arm   Pulse: 64   Temp: 97.5 °F (36.4 °C)   SpO2: 94%   Weight: (!) 174 kg (383 lb)   Height: 194.9 cm (76.75\")     Body mass index is 45.71 kg/m².    Wt Readings from Last 3 Encounters:   06/25/24 (!) 174 kg (383 lb)   01/10/24 (!) 162 kg (357 lb)   08/04/23 (!) 171 kg (377 lb 6.4 oz)     BP Readings from Last 3 Encounters:   06/25/24 152/100   01/10/24 148/100   08/04/23 140/92       Health Maintenance   Topic Date Due    Pneumococcal Vaccine 0-64 (1 of 2 - PCV) Never done "    ANNUAL PHYSICAL  Never done    COVID-19 Vaccine (3 - 2023-24 season) 09/01/2023    INFLUENZA VACCINE  08/01/2024    BMI FOLLOWUP  01/10/2025    TDAP/TD VACCINES (2 - Td or Tdap) 10/25/2032    HEPATITIS C SCREENING  Completed       Physical Exam  Vitals reviewed.   Constitutional:       General: He is not in acute distress.     Appearance: Normal appearance. He is well-developed.   Eyes:      Conjunctiva/sclera: Conjunctivae normal.      Pupils: Pupils are equal, round, and reactive to light.   Cardiovascular:      Rate and Rhythm: Normal rate and regular rhythm.      Heart sounds: Normal heart sounds.   Pulmonary:      Effort: Pulmonary effort is normal.      Breath sounds: Normal breath sounds.   Musculoskeletal:      Cervical back: Neck supple.      Right lower leg: No edema.      Left lower leg: No edema.   Skin:     General: Skin is warm and dry.   Neurological:      Mental Status: He is alert and oriented to person, place, and time.   Psychiatric:         Mood and Affect: Mood and affect normal.         Behavior: Behavior normal.         Thought Content: Thought content normal.         Judgment: Judgment normal.            Result Review :  The following data was reviewed by: RHODA Lunsford on 06/25/2024:  No visits with results within 1 Month(s) from this visit.   Latest known visit with results is:   Office Visit on 01/10/2024   Component Date Value    Glucose 01/10/2024 90     BUN 01/10/2024 12     Creatinine 01/10/2024 1.46 (H)     Sodium 01/10/2024 139     Potassium 01/10/2024 4.1     Chloride 01/10/2024 101     CO2 01/10/2024 29.0     Calcium 01/10/2024 9.8     Total Protein 01/10/2024 7.2     Albumin 01/10/2024 4.2     ALT (SGPT) 01/10/2024 20     AST (SGOT) 01/10/2024 27     Alkaline Phosphatase 01/10/2024 117     Total Bilirubin 01/10/2024 0.5     Globulin 01/10/2024 3.0     A/G Ratio 01/10/2024 1.4     BUN/Creatinine Ratio 01/10/2024 8.2     Anion Gap 01/10/2024 9.0     eGFR 01/10/2024 64.7      Hemoglobin A1C 01/10/2024 5.40     Total Cholesterol 01/10/2024 189     Triglycerides 01/10/2024 126     HDL Cholesterol 01/10/2024 31 (L)     LDL Cholesterol  01/10/2024 135 (H)     VLDL Cholesterol 01/10/2024 23     LDL/HDL Ratio 01/10/2024 4.28     TSH 01/10/2024 2.310     WBC 01/10/2024 11.11 (H)     RBC 01/10/2024 5.83 (H)     Hemoglobin 01/10/2024 15.9     Hematocrit 01/10/2024 47.8     MCV 01/10/2024 82.0     MCH 01/10/2024 27.3     MCHC 01/10/2024 33.3     RDW 01/10/2024 14.4     RDW-SD 01/10/2024 42.0     MPV 01/10/2024 9.9     Platelets 01/10/2024 272     Neutrophil % 01/10/2024 75.7     Lymphocyte % 01/10/2024 15.4 (L)     Monocyte % 01/10/2024 5.1     Eosinophil % 01/10/2024 2.8     Basophil % 01/10/2024 0.5     Immature Grans % 01/10/2024 0.5     Neutrophils, Absolute 01/10/2024 8.40 (H)     Lymphocytes, Absolute 01/10/2024 1.71     Monocytes, Absolute 01/10/2024 0.57     Eosinophils, Absolute 01/10/2024 0.31     Basophils, Absolute 01/10/2024 0.06     Immature Grans, Absolute 01/10/2024 0.06 (H)     nRBC 01/10/2024 0.0        Procedures        Assessment and Plan   Diagnoses and all orders for this visit:    1. Essential hypertension  -     cloNIDine (CATAPRES) 0.2 MG tablet; Take 1 tablet by mouth 2 (Two) Times a Day.  Dispense: 180 tablet; Refill: 0  -     amLODIPine (NORVASC) 10 MG tablet; Take 1 tablet by mouth Daily.  Dispense: 90 tablet; Refill: 0  -     losartan (COZAAR) 100 MG tablet; Take 1 tablet by mouth Daily.  Dispense: 90 tablet; Refill: 0  -     CBC & Differential  -     Comprehensive Metabolic Panel  -     Lipid Panel  -     Urinalysis With Culture If Indicated -    2. Arthralgia, unspecified joint  -     diclofenac (VOLTAREN) 75 MG EC tablet; Take 1 tablet by mouth 2 (Two) Times a Day.  Dispense: 180 tablet; Refill: 0    3. Peripheral edema  -     hydrALAZINE (APRESOLINE) 50 MG tablet; TAKE 2 TABLETS BY MOUTH EVERY MORNING AND 1 TABLET EVERY EVENING  Dispense: 270 tablet; Refill:  0    4. Hypothyroidism, unspecified type  -     levothyroxine (SYNTHROID, LEVOTHROID) 50 MCG tablet; Take 1 tablet by mouth Daily.  Dispense: 90 tablet; Refill: 0  -     TSH Rfx On Abnormal To Free T4    Will increase patient's clonidine to 0.2 mg twice daily.  Discussed with patient importance of following a healthy diet, reducing portion sizes, and increasing activity.  With elevated blood pressure discussed with patient that he will need to follow-up in the office in 3 months instead of his usual every 6 month appointment.    FOLLOW UP  Return in about 3 months (around 9/25/2024) for Refills and fasting labs.    Patient was given instructions and counseling regarding his condition or for health maintenance advice. Please see specific information pulled into the AVS if appropriate.       RHODA Lunsford  06/25/24  08:48 EDT    CURRENT & DISCONTINUED MEDICATIONS  Current Outpatient Medications   Medication Instructions    amLODIPine (NORVASC) 10 mg, Oral, Daily    Blood Pressure Monitoring (Blood Pressure Cuff) misc Use as directed to monitor blood pressure once daily    Caplyta 42 MG capsule 1 capsule, Oral, Daily    cloNIDine (CATAPRES) 0.2 mg, Oral, 2 Times Daily    diclofenac (VOLTAREN) 75 mg, Oral, 2 Times Daily    flecainide (TAMBOCOR) 50 MG tablet 1 tablet, Oral, Every 12 Hours Scheduled    FLUoxetine (PROZAC) 40 mg, Oral, Every Morning, Take with 20mg cap for total dose of 60mg    FLUoxetine (PROZAC) 20 mg, Oral, Daily, Take with 40mg for total dose of 60mg.    gabapentin (NEURONTIN) 800 mg, Oral, Nightly    hydrALAZINE (APRESOLINE) 50 MG tablet TAKE 2 TABLETS BY MOUTH EVERY MORNING AND 1 TABLET EVERY EVENING    hydrOXYzine (ATARAX) 25 MG tablet TAKE 1 TO 2 TABLETS BY MOUTH ONCE DAILY AS NEEDED SEVERE  ANXIETY    levothyroxine (SYNTHROID, LEVOTHROID) 50 mcg, Oral, Daily    losartan (COZAAR) 100 mg, Oral, Daily    paliperidone (INVEGA) 9 mg, Oral, Every Morning       Medications Discontinued During  This Encounter   Medication Reason    losartan (COZAAR) 100 MG tablet Reorder    levothyroxine (SYNTHROID, LEVOTHROID) 50 MCG tablet Reorder    hydrALAZINE (APRESOLINE) 50 MG tablet Reorder    amLODIPine (NORVASC) 10 MG tablet Reorder    cloNIDine (CATAPRES) 0.1 MG tablet Reorder    diclofenac (VOLTAREN) 75 MG EC tablet Reorder
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
## 2024-07-02 ENCOUNTER — TELEMEDICINE (OUTPATIENT)
Dept: PSYCHIATRY | Facility: CLINIC | Age: 34
End: 2024-07-02
Payer: COMMERCIAL

## 2024-07-02 ENCOUNTER — TELEMEDICINE (OUTPATIENT)
Dept: BEHAVIORAL HEALTH | Facility: CLINIC | Age: 34
End: 2024-07-02
Payer: COMMERCIAL

## 2024-07-02 DIAGNOSIS — F33.9 RECURRENT MAJOR DEPRESSIVE DISORDER, REMISSION STATUS UNSPECIFIED: Primary | ICD-10-CM

## 2024-07-02 DIAGNOSIS — F41.1 GENERALIZED ANXIETY DISORDER: ICD-10-CM

## 2024-07-02 DIAGNOSIS — F51.5 NIGHTMARES: ICD-10-CM

## 2024-07-02 DIAGNOSIS — F51.05 INSOMNIA DUE TO OTHER MENTAL DISORDER: ICD-10-CM

## 2024-07-02 DIAGNOSIS — F20.0 SCHIZOPHRENIA, PARANOID TYPE: Primary | ICD-10-CM

## 2024-07-02 DIAGNOSIS — F99 INSOMNIA DUE TO OTHER MENTAL DISORDER: ICD-10-CM

## 2024-07-02 DIAGNOSIS — F41.1 GAD (GENERALIZED ANXIETY DISORDER): ICD-10-CM

## 2024-07-02 PROCEDURE — 99214 OFFICE O/P EST MOD 30 MIN: CPT | Performed by: PHYSICIAN ASSISTANT

## 2024-07-02 PROCEDURE — 90832 PSYTX W PT 30 MINUTES: CPT | Performed by: SOCIAL WORKER

## 2024-07-02 PROCEDURE — 1160F RVW MEDS BY RX/DR IN RCRD: CPT | Performed by: PHYSICIAN ASSISTANT

## 2024-07-02 PROCEDURE — 1159F MED LIST DOCD IN RCRD: CPT | Performed by: PHYSICIAN ASSISTANT

## 2024-07-02 RX ORDER — FLUOXETINE HYDROCHLORIDE 40 MG/1
40 CAPSULE ORAL EVERY MORNING
Qty: 90 CAPSULE | Refills: 0 | Status: SHIPPED | OUTPATIENT
Start: 2024-07-02 | End: 2024-09-30

## 2024-07-02 RX ORDER — GABAPENTIN 800 MG/1
800 TABLET ORAL NIGHTLY
Qty: 90 TABLET | Refills: 0 | Status: SHIPPED | OUTPATIENT
Start: 2024-07-02 | End: 2024-09-30

## 2024-07-02 RX ORDER — HYDROXYZINE HYDROCHLORIDE 25 MG/1
TABLET, FILM COATED ORAL
Qty: 180 TABLET | Refills: 0 | Status: SHIPPED | OUTPATIENT
Start: 2024-07-02

## 2024-07-02 RX ORDER — FLUOXETINE HYDROCHLORIDE 20 MG/1
20 CAPSULE ORAL DAILY
Qty: 90 CAPSULE | Refills: 0 | Status: SHIPPED | OUTPATIENT
Start: 2024-07-02 | End: 2024-09-30

## 2024-07-02 RX ORDER — PALIPERIDONE 9 MG/1
9 TABLET, EXTENDED RELEASE ORAL EVERY MORNING
Qty: 30 TABLET | Refills: 1 | Status: SHIPPED | OUTPATIENT
Start: 2024-07-02
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
## 2024-07-02 NOTE — PSYCHOTHERAPY NOTE
APS-  alerted to my situation.    Nephew living and not his girlfriend    Save for a moped   Still working on visiting friend,   not sure    No depression    Had issues and missed appts   Anxiety ok, calm   self  talk   Just saw theo, no changes   Sunburn,      Playing a hard game, try to save a place   Hopeful-     advocate for self

## 2024-07-23 DIAGNOSIS — R60.0 PERIPHERAL EDEMA: ICD-10-CM

## 2024-07-23 DIAGNOSIS — F20.0 SCHIZOPHRENIA, PARANOID TYPE: ICD-10-CM

## 2024-07-23 DIAGNOSIS — I10 ESSENTIAL HYPERTENSION: ICD-10-CM

## 2024-07-23 DIAGNOSIS — F51.5 NIGHTMARES: ICD-10-CM

## 2024-07-23 DIAGNOSIS — M25.50 ARTHRALGIA, UNSPECIFIED JOINT: ICD-10-CM

## 2024-07-23 DIAGNOSIS — F99 INSOMNIA DUE TO OTHER MENTAL DISORDER: ICD-10-CM

## 2024-07-23 DIAGNOSIS — F51.05 INSOMNIA DUE TO OTHER MENTAL DISORDER: ICD-10-CM

## 2024-07-23 DIAGNOSIS — F41.1 GENERALIZED ANXIETY DISORDER: ICD-10-CM

## 2024-07-23 DIAGNOSIS — E03.9 HYPOTHYROIDISM, UNSPECIFIED TYPE: ICD-10-CM

## 2024-07-24 RX ORDER — GABAPENTIN 800 MG/1
800 TABLET ORAL NIGHTLY
Qty: 90 TABLET | Refills: 0 | OUTPATIENT
Start: 2024-07-24 | End: 2024-10-22

## 2024-07-24 RX ORDER — HYDROXYZINE HYDROCHLORIDE 25 MG/1
TABLET, FILM COATED ORAL
Qty: 180 TABLET | Refills: 0 | OUTPATIENT
Start: 2024-07-24

## 2024-07-24 RX ORDER — FLUOXETINE HYDROCHLORIDE 40 MG/1
40 CAPSULE ORAL EVERY MORNING
Qty: 90 CAPSULE | Refills: 0 | OUTPATIENT
Start: 2024-07-24 | End: 2024-10-22

## 2024-07-24 RX ORDER — PALIPERIDONE 9 MG/1
9 TABLET, EXTENDED RELEASE ORAL EVERY MORNING
Qty: 30 TABLET | Refills: 1 | Status: SHIPPED | OUTPATIENT
Start: 2024-07-24

## 2024-07-24 RX ORDER — FLUOXETINE HYDROCHLORIDE 20 MG/1
20 CAPSULE ORAL DAILY
Qty: 90 CAPSULE | Refills: 0 | OUTPATIENT
Start: 2024-07-24 | End: 2024-10-22

## 2024-07-24 NOTE — TELEPHONE ENCOUNTER
FLUoxetine (PROzac) 40 MG capsule     Last ordered: 3 weeks ago (7/2/2024) by Melly Giang PA-C     gabapentin (Neurontin) 800 MG tablet     Last ordered: 3 weeks ago (7/2/2024) by Melly Giang PA-C     hydrOXYzine (ATARAX) 25 MG tablet       Last ordered: 3 weeks ago (7/2/2024) by Melly Giang PA-C       paliperidone (INVEGA) 9 MG 24 hr tablet     Last ordered: 3 weeks ago (7/2/2024) by Melly Giang PA-C     Follow up 09/03/2024

## 2024-07-24 NOTE — TELEPHONE ENCOUNTER
Called patient to inform of provider message     Can you check with the patient because the other requested meds should be at the pharmacy and was sent for 90 days, thanks!      Patient acknowledged and stated he will call back if he has any issues with pharmacy

## 2024-07-25 RX ORDER — LEVOTHYROXINE SODIUM 0.05 MG/1
50 TABLET ORAL DAILY
Qty: 90 TABLET | Refills: 0 | Status: SHIPPED | OUTPATIENT
Start: 2024-07-25

## 2024-07-25 RX ORDER — ADHESIVE BANDAGE 3/4"
BANDAGE TOPICAL
Qty: 1 EACH | Refills: 0 | OUTPATIENT
Start: 2024-07-25

## 2024-07-25 RX ORDER — DICLOFENAC SODIUM 75 MG/1
75 TABLET, DELAYED RELEASE ORAL 2 TIMES DAILY
Qty: 180 TABLET | Refills: 0 | Status: SHIPPED | OUTPATIENT
Start: 2024-07-25

## 2024-07-25 RX ORDER — CLONIDINE HYDROCHLORIDE 0.2 MG/1
0.2 TABLET ORAL 2 TIMES DAILY
Qty: 180 TABLET | Refills: 0 | Status: SHIPPED | OUTPATIENT
Start: 2024-07-25

## 2024-07-25 RX ORDER — HYDRALAZINE HYDROCHLORIDE 50 MG/1
TABLET, FILM COATED ORAL
Qty: 270 TABLET | Refills: 0 | Status: SHIPPED | OUTPATIENT
Start: 2024-07-25

## 2024-07-25 RX ORDER — AMLODIPINE BESYLATE 10 MG/1
10 TABLET ORAL DAILY
Qty: 90 TABLET | Refills: 0 | Status: SHIPPED | OUTPATIENT
Start: 2024-07-25

## 2024-07-25 RX ORDER — FLECAINIDE ACETATE 50 MG/1
50 TABLET ORAL EVERY 12 HOURS SCHEDULED
Qty: 180 TABLET | Refills: 0 | Status: SHIPPED | OUTPATIENT
Start: 2024-07-25

## 2024-07-25 RX ORDER — LOSARTAN POTASSIUM 100 MG/1
100 TABLET ORAL DAILY
Qty: 90 TABLET | Refills: 0 | Status: SHIPPED | OUTPATIENT
Start: 2024-07-25

## 2024-07-29 NOTE — PROGRESS NOTES
[facility] - Progress Note    Date: 07/30/2024  Time In: 09:58  Time Out: 10:28    Patient Legal Name: Anthony Tay  Patient Age: 34 y.o.    CHIEF COMPLAINT: mood    Subjective   History of Present Illness       Anthony returns today for ongoing treatment  Goals from our previous session on 07/02/24 were:   Continue to advocate for himself in relationships  Supportive therapy for relief of symptom burden     Assessment    Mental Status Exam     Appearance: good hygiene and dressed appropriately for the weather  Behavior: calm  Cooperation:  engaged, cooperative, attentive, and friendly  Eye Contact:  good  Affect:  bright and congruent  Mood: expressive and happy  Speech: responsive and talkative  Thought Process:  organized and goal-oriented  Thought Content: appropriate  Suicidal: denies  Homicidal:  denies  Hallucinations:  denies  Memory:  intact  Orientation:  person, place, time, and situation  Reliability:  reliable  Insight:  good  Judgment:  good     Clinical Intervention       ICD-10-CM ICD-9-CM   1. RACHEL (generalized anxiety disorder)  F41.1 300.02   2. Recurrent major depressive disorder, in remission  F33.40 296.35      Anthony is a 34 y.o. male who presents today as a follow-up for continued psychotherapy. Individual psychotherapy was provided utilizing solution focused  techniques to restore adaptive functioning, provide symptom relief, encourage expression of thoughts and feelings, support self-esteem, discuss values and strengths, recognize patterns of behavior, identify strengths, build confidence, assess symptoms, provide support, and discuss interpersonal conflicts.  Anthony reports current stability with treatment.  He continues to verbalize some anxiousness with storms but overall feels this is controlled and manageable.  His current living situation has remained improved living in his brothers house.  He does seem better able to advocate for himself in their current  dynamic. His mother is now staying there also and this has provided some new challenges for the family .  We discussed personal goals regarding managing his resources, planning for positive experiences in the future.    Plan   Plan & Goals     Continue to make progress working on personal goals.  Supportive therapy to relieve symptom burden and improve adaptive skills       Patient acknowledged and verbally consented to continue working toward resolving current treatment plan goals and was educated on the importance of participation in the therapeutic process.  Patient will remain compliant with medication regimen as prescribed. Discuss any medication side effects, questions or concerns with prescribing provider.  Call 911 or present to the nearest emergency room in an emergency situation.  National Suicide Prevention Lifeline: Call 988. The Lifeline provides 24/7, free and confidential support for people in distress, prevention and crisis resources.  Crisis Text Line  Text HOME To 278075    Return in about 4 weeks (around 8/27/2024).    ____________________  This document has been electronically signed by Madison Yeager LCSW  July 30, 2024 10:49 EDT    Part of this note may be an electronic transcription/translation of spoken language to printed text using the Dragon Dictation System.

## 2024-07-30 ENCOUNTER — TELEMEDICINE (OUTPATIENT)
Dept: PSYCHIATRY | Facility: CLINIC | Age: 34
End: 2024-07-30
Payer: COMMERCIAL

## 2024-07-30 DIAGNOSIS — F33.40 RECURRENT MAJOR DEPRESSIVE DISORDER, IN REMISSION: ICD-10-CM

## 2024-07-30 DIAGNOSIS — F41.1 GAD (GENERALIZED ANXIETY DISORDER): Primary | ICD-10-CM

## 2024-07-30 PROCEDURE — 90832 PSYTX W PT 30 MINUTES: CPT | Performed by: SOCIAL WORKER

## 2024-07-30 NOTE — PSYCHOTHERAPY NOTE
Still no ciagarettes,   breathe better    My mother is staying here,  she got sick , [place] - I dont trust that she wont go back to him,    Meds - less agitation   Anxiety better -self talk   Ordered stuff Sridhar- shaver   Getting more of my money  - saved $400   October they will come out with a new set of cards that I want, 40-50 dollars   Still losing weight, stairs are my enemy   Going to see [employer] - next  summer    Work saving, plannig for future good

## 2024-08-20 ENCOUNTER — TELEMEDICINE (OUTPATIENT)
Dept: PSYCHIATRY | Facility: CLINIC | Age: 34
End: 2024-08-20
Payer: COMMERCIAL

## 2024-08-20 DIAGNOSIS — F41.1 GAD (GENERALIZED ANXIETY DISORDER): Primary | ICD-10-CM

## 2024-08-20 DIAGNOSIS — F33.9 RECURRENT MAJOR DEPRESSIVE DISORDER, REMISSION STATUS UNSPECIFIED: ICD-10-CM

## 2024-08-20 PROCEDURE — 1159F MED LIST DOCD IN RCRD: CPT | Performed by: SOCIAL WORKER

## 2024-08-20 PROCEDURE — 90832 PSYTX W PT 30 MINUTES: CPT | Performed by: SOCIAL WORKER

## 2024-08-20 PROCEDURE — 1160F RVW MEDS BY RX/DR IN RCRD: CPT | Performed by: SOCIAL WORKER

## 2024-08-20 NOTE — PROGRESS NOTES
[facility] - Progress Note    Date: 08/20/2024  Time In: 10:00am   Time Out: 10:26am     Patient Legal Name: Anthony Tay  Patient Age: 34 y.o.    CHIEF COMPLAINT: mood   Mode of visit: Video  Location of provider: [address], [place] [address]  Location of patient: Home     Patient is being seen via telehealth (through [facility]) and patient confirms that they are in a secure environment for this session. The patient's condition being diagnosed/treated is appropriate for telemedicine. The provider identified herself as well as her credentials. The patient gave consent to be seen remotely, and when consent is given they understand that the consent allows for patient identifiable information to be sent to a third party as needed. They may refuse to be seen remotely at any time. The electronic data is encrypted and password protected, and the patient has been advised of the potential risks to privacy not withstanding such measures. Patient consented to the use of video for the purpose of a telehealth session today. The visit included audio and video interaction. No technical issues occurred during this visit.  Subjective   History of Present Illness       Anthony returns today for ongoing treatment  Goals from our most recent session on 07/30/24 were:   Continue to make progress working on personal goals.  Supportive therapy to relieve symptom burden and improve adaptive skills        Assessment    Mental Status Exam     Appearance: good hygiene and dressed appropriately for the weather  Behavior: calm  Cooperation:  engaged, cooperative, attentive, and friendly  Eye Contact:  good  Affect:  congruent  Mood: expressive and happy  Speech: responsive  Thought Process:  organized  Thought Content: appropriate  Suicidal: denies  Homicidal:  denies  Hallucinations:  denies  Memory:  intact  Orientation:  person, place, time, and situation  Reliability:  reliable  Insight:   good  Judgment:  good     Clinical Intervention       ICD-10-CM ICD-9-CM   1. RACHEL (generalized anxiety disorder)  F41.1 300.02   2. Recurrent major depressive disorder, remission status unspecified  F33.9 296.30        Anthony is a 34 y.o. male who presents today as a follow-up for continued psychotherapy. Individual psychotherapy was provided utilizing solution focused  techniques to restore adaptive functioning, provide symptom relief, support self-esteem, promote emotion regulation, increase acceptance, identify strengths, assess symptoms, provide support, discuss interpersonal conflicts, define and establish appropriate boundaries, and identify healthy vs unhealthy relationships.    Anthony reports he is doing well today. He denies significant concerns related to mood. Depression and anxiety are adequately treated . We discussed gains that he has made with treatment and the importance of recognizing this to support further growth.  Discussed challenging relationships and healthy boundaries.     Plan   Plan & Goals     Continue to work on personal goals related to health, hobbies and finances  Supportive therapy to relieve symptom burden and promote adaptive skills.     Patient acknowledged and verbally consented to continue working toward resolving current treatment plan goals and was educated on the importance of participation in the therapeutic process.  Patient will remain compliant with medication regimen as prescribed. Discuss any medication side effects, questions or concerns with prescribing provider.  Call 911 or present to the nearest emergency room in an emergency situation.  National Suicide Prevention Lifeline: Call 988. The Lifeline provides 24/7, free and confidential support for people in distress, prevention and crisis resources.  Crisis Text Line  Text HOME To 671992    Return in about 3 weeks (around 9/10/2024).    ____________________  This document has been electronically signed by  Madison Yeager, MONSEW  August 20, 2024 10:29 EDT    Part of this note may be an electronic transcription/translation of spoken language to printed text using the Dragon Dictation System.

## 2024-08-20 NOTE — PSYCHOTHERAPY NOTE
Mother here,   showed up -- few months to figure things out,  camper outside  Brothers mad,  KEM- sides with her   [employer] set  cone October, will pre order them  card shop   Brother good   Still losing arti,      Not letting stuff get   [facility]  once day

## 2024-09-12 ENCOUNTER — TELEMEDICINE (OUTPATIENT)
Dept: PSYCHIATRY | Facility: CLINIC | Age: 34
End: 2024-09-12
Payer: COMMERCIAL

## 2024-09-12 DIAGNOSIS — F41.1 GAD (GENERALIZED ANXIETY DISORDER): Primary | ICD-10-CM

## 2024-09-12 DIAGNOSIS — F33.9 RECURRENT MAJOR DEPRESSIVE DISORDER, REMISSION STATUS UNSPECIFIED: ICD-10-CM

## 2024-09-12 NOTE — PSYCHOTHERAPY NOTE
Sleepy for past few days,  today feel good     Made appt for teeth, - no one showed up for work today    October appt  Teeth breaking   Brother and SILs  still good  Mom still there,  there too-  still a challenge   Pre ordered deck of cards Veronica- coming 28th Sept   Ask brother to take me to card shop twice month if I give him gas money  Looking for a scooter   Having fun with little brother on x box   No panic or anxiety     Save up to go to convention in [facility],   Friend that lives in [place]  2 friends in [place]

## 2024-09-12 NOTE — PROGRESS NOTES
[facility] - Progress Note    Date: 09/12/2024  Time In: 10:00am   Time Out: 10:38    Patient Legal Name: Anthony Tay  Patient Age: 34 y.o.    CHIEF COMPLAINT: mood   Mode of visit: Video  Location of provider: [address], [place] [address]  Location of patient: home     Patient is being seen via telehealth (through [facility]) and patient confirms that they are in a secure environment for this session. The patient's condition being diagnosed/treated is appropriate for telemedicine. The provider identified herself as well as her credentials. The patient gave consent to be seen remotely, and when consent is given they understand that the consent allows for patient identifiable information to be sent to a third party as needed. They may refuse to be seen remotely at any time. The electronic data is encrypted and password protected, and the patient has been advised of the potential risks to privacy not withstanding such measures. Patient consented to the use of video for the purpose of a telehealth session today. The visit included audio and video interaction. No technical issues occurred during this visit.  Subjective   History of Present Illness     Anthony returns today for ongoing treatment  Goals from our previous session on 08/20/24 were:   Continue to work on personal goals related to health, hobbies and finances  Supportive therapy to relieve symptom burden and promote adaptive skills.        Assessment    Mental Status Exam     Appearance: good hygiene and dressed appropriately for the weather  Behavior: calm  Cooperation:  engaged, cooperative, attentive, and friendly  Eye Contact:  good  Affect:  bright and congruent  Mood: expressive  Speech: responsive and talkative  Thought Process:  organized  Thought Content: appropriate  Suicidal: denies  Homicidal:  denies  Hallucinations:  denies  Memory:  intact  Orientation:  person, place, time, and  situation  Reliability:  reliable  Insight:  good  Judgment:  good     Clinical Intervention       ICD-10-CM ICD-9-CM   1. RACHEL (generalized anxiety disorder)  F41.1 300.02   2. Recurrent major depressive disorder, remission status unspecified  F33.9 296.30        Anthony (34 y.o.) presents today as a follow-up for continued psychotherapy. Individual psychotherapy was provided utilizing solution focused  techniques to provide symptom relief, support self-esteem, recognize patterns of behavior, acknowledge sources of feelings and behaviors, challenge negative thinking patterns, and provide support.  Anthony continues to report ongoing progress.  He has interests and hobbies that he enjoys.  He connects with friends online.  He is having better relationships with family. He states that he had noticed being more tired recently but does not feel that this is related to any issues with mood.  We continue to discuss the importance of self care and recognizing gains with current treatment.     Plan   Plan & Goals     Supportive therapy to relieve symptom burden and promote adaptive skills   Recognize behaviors that have contributed to growth, continue with self care efforts.     Patient acknowledged and verbally consented to continue working toward resolving current treatment plan goals and was educated on the importance of participation in the therapeutic process.  Patient will remain compliant with medication regimen as prescribed. Discuss any medication side effects, questions or concerns with prescribing provider.  Call 911 or present to the nearest emergency room in an emergency situation.  National Suicide Prevention Lifeline: Call 988. The Lifeline provides 24/7, free and confidential support for people in distress, prevention and crisis resources.  Crisis Text Line  Text HOME To 351560    Return in about 3 weeks (around 10/3/2024).    ____________________  This document has been electronically signed by Madison  SHANNON Yeager  September 12, 2024 10:46 EDT    Part of this note may be an electronic transcription/translation of spoken language to printed text using the Dragon Dictation System.

## 2024-09-27 NOTE — PROGRESS NOTES
[facility] - Progress Note    Date: 10/01/2024  Time In: 10:00am   Time Out: 10:20am     Patient Legal Name: Anthony Tay  Patient Age: 34 y.o.    CHIEF COMPLAINT: mood   Mode of visit: Video  Location of provider: [address], [place] [address]  Location of patient: Home     Patient is being seen via telehealth (through [facility]) and patient confirms that they are in a secure environment for this session. The patient's condition being diagnosed/treated is appropriate for telemedicine. The provider identified herself as well as her credentials. The patient gave consent to be seen remotely, and when consent is given they understand that the consent allows for patient identifiable information to be sent to a third party as needed. They may refuse to be seen remotely at any time. The electronic data is encrypted and password protected, and the patient has been advised of the potential risks to privacy not withstanding such measures. Patient consented to the use of video for the purpose of a telehealth session today. The visit included audio and video interaction. No technical issues occurred during this visit.  Subjective   History of Present Illness       Anthony returns today for ongoing treatment  Goals from our previous session on 09/12/24 were: Supportive therapy to relieve symptom burden and promote adaptive skills   Recognize behaviors that have contributed to growth, continue with self care efforts.        Assessment    Mental Status Exam     Appearance: good hygiene and dressed appropriately for the weather  Behavior: calm  Cooperation:  engaged, cooperative, attentive, and friendly  Eye Contact:  good  Affect:  congruent  Mood: expressive  Speech: responsive and talkative  Thought Process:  organized  Thought Content: appropriate  Suicidal: denies  Homicidal:  denies  Hallucinations:  denies  Memory:  intact  Orientation:  person, place, time, and  situation  Reliability:  reliable  Insight:  good  Judgment:  good     Clinical Intervention       ICD-10-CM ICD-9-CM   1. RACHEL (generalized anxiety disorder)  F41.1 300.02   2. Recurrent major depressive disorder, remission status unspecified  F33.9 296.30      Anthony (34 y.o.) presents today as a follow-up for continued psychotherapy. Individual psychotherapy was provided utilizing solution focused  techniques to provide symptom relief, promote emotion regulation, acknowledge sources of feelings and behaviors, identify strengths, build confidence, assess symptoms, and provide support. Anthony reports that his mood is stable, no concerning symptoms of anxiety of depression at this time.  Discussed family and friend relationships and activities that  are keeping him active and healthy.  Plan   Plan & Goals     Supportive therapy to relieve sympotm burden and promote adaptive skills.     Patient acknowledged and verbally consented to continue working toward resolving current treatment plan goals and was educated on the importance of participation in the therapeutic process.  Patient will remain compliant with medication regimen as prescribed. Discuss any medication side effects, questions or concerns with prescribing provider.  Call 911 or present to the nearest emergency room in an emergency situation.  National Suicide Prevention Lifeline: Call 988. The Lifeline provides 24/7, free and confidential support for people in distress, prevention and crisis resources.  Crisis Text Line  Text HOME To 089116    Return in about 4 years (around 10/1/2028).    ____________________  This document has been electronically signed by Madison Yeager LCSW  October 1, 2024 10:31 EDT    Part of this note may be an electronic transcription/translation of spoken language to printed text using the Dragon Dictation System.

## 2024-10-01 ENCOUNTER — TELEMEDICINE (OUTPATIENT)
Dept: PSYCHIATRY | Facility: CLINIC | Age: 34
End: 2024-10-01
Payer: COMMERCIAL

## 2024-10-01 DIAGNOSIS — F41.1 GAD (GENERALIZED ANXIETY DISORDER): Primary | ICD-10-CM

## 2024-10-01 DIAGNOSIS — F33.9 RECURRENT MAJOR DEPRESSIVE DISORDER, REMISSION STATUS UNSPECIFIED: ICD-10-CM

## 2024-10-01 NOTE — PSYCHOTHERAPY NOTE
Clearing property   Moms  went to [place],   his mom having surgery   Tradin a deck of cards  with a friend in [place],    Missed an theo appt       Social securtiy ,  someone trying to get [employer] purchases $7 purchases - had to get a new card   Nephews birthday - calls me uncle Pato

## 2024-10-08 ENCOUNTER — TELEMEDICINE (OUTPATIENT)
Dept: BEHAVIORAL HEALTH | Facility: CLINIC | Age: 34
End: 2024-10-08
Payer: COMMERCIAL

## 2024-10-08 DIAGNOSIS — F20.0 SCHIZOPHRENIA, PARANOID TYPE: Primary | ICD-10-CM

## 2024-10-08 DIAGNOSIS — F99 INSOMNIA DUE TO OTHER MENTAL DISORDER: ICD-10-CM

## 2024-10-08 DIAGNOSIS — F41.1 GENERALIZED ANXIETY DISORDER: ICD-10-CM

## 2024-10-08 DIAGNOSIS — F51.05 INSOMNIA DUE TO OTHER MENTAL DISORDER: ICD-10-CM

## 2024-10-08 DIAGNOSIS — F51.5 NIGHTMARES: ICD-10-CM

## 2024-10-08 PROCEDURE — 1160F RVW MEDS BY RX/DR IN RCRD: CPT | Performed by: PHYSICIAN ASSISTANT

## 2024-10-08 PROCEDURE — 1159F MED LIST DOCD IN RCRD: CPT | Performed by: PHYSICIAN ASSISTANT

## 2024-10-08 PROCEDURE — 99214 OFFICE O/P EST MOD 30 MIN: CPT | Performed by: PHYSICIAN ASSISTANT

## 2024-10-08 RX ORDER — HYDROXYZINE HYDROCHLORIDE 25 MG/1
TABLET, FILM COATED ORAL
Qty: 180 TABLET | Refills: 0 | Status: SHIPPED | OUTPATIENT
Start: 2024-10-08

## 2024-10-08 RX ORDER — FLUOXETINE 40 MG/1
40 CAPSULE ORAL EVERY MORNING
Qty: 90 CAPSULE | Refills: 0 | Status: SHIPPED | OUTPATIENT
Start: 2024-10-08 | End: 2025-01-06

## 2024-10-08 RX ORDER — PALIPERIDONE 9 MG/1
9 TABLET, EXTENDED RELEASE ORAL EVERY MORNING
Qty: 30 TABLET | Refills: 2 | Status: SHIPPED | OUTPATIENT
Start: 2024-10-08

## 2024-10-08 RX ORDER — GABAPENTIN 800 MG/1
800 TABLET ORAL NIGHTLY
Qty: 90 TABLET | Refills: 0 | Status: SHIPPED | OUTPATIENT
Start: 2024-10-08 | End: 2025-01-06
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
## 2024-10-26 DIAGNOSIS — M25.50 ARTHRALGIA, UNSPECIFIED JOINT: ICD-10-CM

## 2024-10-26 DIAGNOSIS — I10 ESSENTIAL HYPERTENSION: ICD-10-CM

## 2024-10-26 DIAGNOSIS — R60.0 PERIPHERAL EDEMA: ICD-10-CM

## 2024-10-26 DIAGNOSIS — E03.9 HYPOTHYROIDISM, UNSPECIFIED TYPE: ICD-10-CM

## 2024-10-28 RX ORDER — FLECAINIDE ACETATE 50 MG/1
50 TABLET ORAL EVERY 12 HOURS SCHEDULED
Qty: 180 TABLET | Refills: 0 | Status: SHIPPED | OUTPATIENT
Start: 2024-10-28

## 2024-10-28 RX ORDER — CLONIDINE HYDROCHLORIDE 0.2 MG/1
0.2 TABLET ORAL 2 TIMES DAILY
Qty: 180 TABLET | Refills: 0 | Status: SHIPPED | OUTPATIENT
Start: 2024-10-28

## 2024-10-28 RX ORDER — HYDRALAZINE HYDROCHLORIDE 50 MG/1
TABLET, FILM COATED ORAL
Qty: 270 TABLET | Refills: 0 | Status: SHIPPED | OUTPATIENT
Start: 2024-10-28

## 2024-10-28 RX ORDER — AMLODIPINE BESYLATE 10 MG/1
10 TABLET ORAL DAILY
Qty: 90 TABLET | Refills: 0 | Status: SHIPPED | OUTPATIENT
Start: 2024-10-28

## 2024-10-28 RX ORDER — LEVOTHYROXINE SODIUM 50 UG/1
50 TABLET ORAL DAILY
Qty: 90 TABLET | Refills: 0 | Status: SHIPPED | OUTPATIENT
Start: 2024-10-28

## 2024-10-28 RX ORDER — LOSARTAN POTASSIUM 100 MG/1
100 TABLET ORAL DAILY
Qty: 90 TABLET | Refills: 0 | Status: SHIPPED | OUTPATIENT
Start: 2024-10-28

## 2024-10-28 RX ORDER — ADHESIVE BANDAGE 3/4"
BANDAGE TOPICAL
Qty: 1 EACH | Refills: 0 | OUTPATIENT
Start: 2024-10-28

## 2024-10-28 RX ORDER — DICLOFENAC SODIUM 75 MG/1
75 TABLET, DELAYED RELEASE ORAL 2 TIMES DAILY
Qty: 180 TABLET | Refills: 0 | Status: SHIPPED | OUTPATIENT
Start: 2024-10-28

## 2024-11-07 ENCOUNTER — TELEMEDICINE (OUTPATIENT)
Dept: PSYCHIATRY | Facility: CLINIC | Age: 34
End: 2024-11-07
Payer: COMMERCIAL

## 2024-11-07 DIAGNOSIS — F41.1 GAD (GENERALIZED ANXIETY DISORDER): Primary | ICD-10-CM

## 2024-11-07 DIAGNOSIS — F33.9 RECURRENT MAJOR DEPRESSIVE DISORDER, REMISSION STATUS UNSPECIFIED: ICD-10-CM
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
## 2024-11-07 NOTE — TREATMENT PLAN
Multi-Disciplinary Problems (from Behavioral Health Treatment Plan)      Active Problems       Problem: Anxiety  Start Date: 11/07/24      Problem Details: The patient self-scales this problem as a 8 with 10 being the worst.          Goal Priority Start Date Expected End Date End Date    Patient will develop and implement behavioral and cognitive strategies to reduce anxiety and irrational fears. -- 11/07/24 05/08/25 --    Goal Details: Progress toward goal:  The patient self-scales their progress related to this goal as a 8 with 10 being the worst.          Goal Intervention Frequency Start Date End Date    Help patient explore past emotional issues in relation to present anxiety. Q Month 11/07/24 --    Intervention Details: Duration of treatment until remission of symptoms.          Goal Intervention Frequency Start Date End Date    Help patient develop an awareness of their cognitive and physical responses to anxiety. Monthly 11/07/24 --    Intervention Details: Duration of treatment until remission of symptoms.                                 I have discussed and reviewed this treatment plan with the patient.

## 2024-11-07 NOTE — PLAN OF CARE
Is This A New Presentation, Or A Follow-Up?: Skin Lesion Patient rates anxiety 4-8 depending on external factors that may influence  Will reassess in 90 days    What Type Of Note Output Would You Prefer (Optional)?: Standard Output How Severe Is Your Skin Lesion?: moderate Has Your Skin Lesion Been Treated?: not been treated

## 2024-11-20 ENCOUNTER — TELEPHONE (OUTPATIENT)
Dept: PSYCHIATRY | Facility: CLINIC | Age: 34
End: 2024-11-20

## 2024-11-20 ENCOUNTER — TELEMEDICINE (OUTPATIENT)
Dept: PSYCHIATRY | Facility: CLINIC | Age: 34
End: 2024-11-20
Payer: COMMERCIAL

## 2024-11-20 DIAGNOSIS — F41.1 GAD (GENERALIZED ANXIETY DISORDER): Primary | ICD-10-CM

## 2024-11-20 DIAGNOSIS — F33.9 RECURRENT MAJOR DEPRESSIVE DISORDER, REMISSION STATUS UNSPECIFIED: ICD-10-CM
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
## 2024-12-10 ENCOUNTER — TELEMEDICINE (OUTPATIENT)
Dept: BEHAVIORAL HEALTH | Facility: CLINIC | Age: 34
End: 2024-12-10
Payer: COMMERCIAL

## 2024-12-10 DIAGNOSIS — F51.05 INSOMNIA DUE TO OTHER MENTAL DISORDER: ICD-10-CM

## 2024-12-10 DIAGNOSIS — F51.5 NIGHTMARES: ICD-10-CM

## 2024-12-10 DIAGNOSIS — F99 INSOMNIA DUE TO OTHER MENTAL DISORDER: ICD-10-CM

## 2024-12-10 DIAGNOSIS — F41.1 GENERALIZED ANXIETY DISORDER: ICD-10-CM

## 2024-12-10 DIAGNOSIS — F20.0 SCHIZOPHRENIA, PARANOID TYPE: Primary | ICD-10-CM

## 2024-12-10 RX ORDER — GABAPENTIN 800 MG/1
800 TABLET ORAL NIGHTLY
Qty: 90 TABLET | Refills: 0 | Status: SHIPPED | OUTPATIENT
Start: 2024-12-10 | End: 2025-03-10

## 2024-12-10 RX ORDER — PALIPERIDONE 9 MG/1
9 TABLET, EXTENDED RELEASE ORAL EVERY MORNING
Qty: 30 TABLET | Refills: 2 | Status: SHIPPED | OUTPATIENT
Start: 2024-12-10

## 2024-12-10 RX ORDER — FLUOXETINE 40 MG/1
40 CAPSULE ORAL EVERY MORNING
Qty: 90 CAPSULE | Refills: 0 | Status: SHIPPED | OUTPATIENT
Start: 2024-12-10 | End: 2025-03-10

## 2024-12-10 RX ORDER — HYDROXYZINE HYDROCHLORIDE 25 MG/1
TABLET, FILM COATED ORAL
Qty: 180 TABLET | Refills: 0 | Status: SHIPPED | OUTPATIENT
Start: 2024-12-10
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
## 2025-02-10 ENCOUNTER — TELEMEDICINE (OUTPATIENT)
Dept: BEHAVIORAL HEALTH | Facility: CLINIC | Age: 35
End: 2025-02-10
Payer: COMMERCIAL

## 2025-02-10 DIAGNOSIS — F51.05 INSOMNIA DUE TO OTHER MENTAL DISORDER: ICD-10-CM

## 2025-02-10 DIAGNOSIS — F51.5 NIGHTMARES: ICD-10-CM

## 2025-02-10 DIAGNOSIS — F20.0 SCHIZOPHRENIA, PARANOID TYPE: Primary | ICD-10-CM

## 2025-02-10 DIAGNOSIS — F99 INSOMNIA DUE TO OTHER MENTAL DISORDER: ICD-10-CM

## 2025-02-10 DIAGNOSIS — F41.1 GENERALIZED ANXIETY DISORDER: ICD-10-CM

## 2025-02-10 PROCEDURE — 99214 OFFICE O/P EST MOD 30 MIN: CPT | Performed by: PHYSICIAN ASSISTANT

## 2025-02-10 PROCEDURE — 96127 BRIEF EMOTIONAL/BEHAV ASSMT: CPT | Performed by: PHYSICIAN ASSISTANT

## 2025-02-10 PROCEDURE — 1160F RVW MEDS BY RX/DR IN RCRD: CPT | Performed by: PHYSICIAN ASSISTANT

## 2025-02-10 PROCEDURE — 1159F MED LIST DOCD IN RCRD: CPT | Performed by: PHYSICIAN ASSISTANT

## 2025-02-10 RX ORDER — PALIPERIDONE 9 MG/1
9 TABLET, EXTENDED RELEASE ORAL EVERY MORNING
Qty: 30 TABLET | Refills: 2 | Status: SHIPPED | OUTPATIENT
Start: 2025-02-10

## 2025-02-10 RX ORDER — HYDROXYZINE HYDROCHLORIDE 25 MG/1
TABLET, FILM COATED ORAL
Qty: 180 TABLET | Refills: 0 | Status: SHIPPED | OUTPATIENT
Start: 2025-02-10

## 2025-02-10 RX ORDER — GABAPENTIN 800 MG/1
800 TABLET ORAL NIGHTLY
Qty: 90 TABLET | Refills: 0 | Status: SHIPPED | OUTPATIENT
Start: 2025-02-10 | End: 2025-05-11

## 2025-02-10 RX ORDER — FLUOXETINE 40 MG/1
40 CAPSULE ORAL EVERY MORNING
Qty: 90 CAPSULE | Refills: 0 | Status: SHIPPED | OUTPATIENT
Start: 2025-02-10 | End: 2025-05-11

## 2025-02-10 NOTE — PROGRESS NOTES
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 to call the office with any worsening of symptoms or onset of side effects. Patient is agreeable to call 911 or go to the nearest ER should he/she begin having SI/HI. No medication side effects or related complaints today.            This document has been electronically signed by Melly Giang PA-C  February 10, 2025 10:29 EST      Part of this note may be an electronic transcription/translation of spoken language to printed text using the Dragon Dictation System.

## 2025-03-04 ENCOUNTER — HOSPITAL ENCOUNTER (INPATIENT)
Facility: HOSPITAL | Age: 35
LOS: 2 days | Discharge: HOME | DRG: 322 | End: 2025-03-06
Attending: EMERGENCY MEDICINE | Admitting: INTERNAL MEDICINE
Payer: COMMERCIAL

## 2025-03-04 ENCOUNTER — APPOINTMENT (OUTPATIENT)
Dept: GENERAL RADIOLOGY | Facility: HOSPITAL | Age: 35
DRG: 322 | End: 2025-03-04
Payer: COMMERCIAL

## 2025-03-04 DIAGNOSIS — I21.3 ST ELEVATION MYOCARDIAL INFARCTION (STEMI), UNSPECIFIED ARTERY: Primary | ICD-10-CM

## 2025-03-04 DIAGNOSIS — I25.2 H/O ST ELEVATION MYOCARDIAL INFARCTION: ICD-10-CM

## 2025-03-04 LAB
ACT BLD: 291 SECONDS (ref 89–137)
ALBUMIN SERPL-MCNC: 3.9 G/DL (ref 3.5–5.2)
ALBUMIN/GLOB SERPL: 1.1 G/DL
ALP SERPL-CCNC: 112 U/L (ref 39–117)
ALT SERPL W P-5'-P-CCNC: 24 U/L (ref 1–41)
ANION GAP SERPL CALCULATED.3IONS-SCNC: 11 MMOL/L (ref 5–15)
ANION GAP SERPL CALCULATED.3IONS-SCNC: 12.5 MMOL/L (ref 5–15)
AST SERPL-CCNC: 61 U/L (ref 1–40)
BASOPHILS # BLD AUTO: 0.01 10*3/MM3 (ref 0–0.2)
BASOPHILS NFR BLD AUTO: 0.1 % (ref 0–1.5)
BILIRUB SERPL-MCNC: 0.5 MG/DL (ref 0–1.2)
BUN SERPL-MCNC: 10 MG/DL (ref 6–20)
BUN SERPL-MCNC: 10 MG/DL (ref 6–20)
BUN/CREAT SERPL: 7.9 (ref 7–25)
BUN/CREAT SERPL: 8.5 (ref 7–25)
CALCIUM SPEC-SCNC: 8.8 MG/DL (ref 8.6–10.5)
CALCIUM SPEC-SCNC: 9.5 MG/DL (ref 8.6–10.5)
CHLORIDE SERPL-SCNC: 102 MMOL/L (ref 98–107)
CHLORIDE SERPL-SCNC: 103 MMOL/L (ref 98–107)
CK MB SERPL-CCNC: 14.9 NG/ML
CK MB SERPL-CCNC: 50.65 NG/ML
CK SERPL-CCNC: 187 U/L (ref 20–200)
CK SERPL-CCNC: 538 U/L (ref 20–200)
CO2 SERPL-SCNC: 20.5 MMOL/L (ref 22–29)
CO2 SERPL-SCNC: 22 MMOL/L (ref 22–29)
CREAT SERPL-MCNC: 1.18 MG/DL (ref 0.76–1.27)
CREAT SERPL-MCNC: 1.26 MG/DL (ref 0.76–1.27)
DEPRECATED RDW RBC AUTO: 42.2 FL (ref 37–54)
EGFRCR SERPLBLD CKD-EPI 2021: 76.8 ML/MIN/1.73
EGFRCR SERPLBLD CKD-EPI 2021: 83 ML/MIN/1.73
EOSINOPHIL # BLD AUTO: 0.04 10*3/MM3 (ref 0–0.4)
EOSINOPHIL NFR BLD AUTO: 0.4 % (ref 0.3–6.2)
ERYTHROCYTE [DISTWIDTH] IN BLOOD BY AUTOMATED COUNT: 14.4 % (ref 12.3–15.4)
GLOBULIN UR ELPH-MCNC: 3.7 GM/DL
GLUCOSE SERPL-MCNC: 114 MG/DL (ref 65–99)
GLUCOSE SERPL-MCNC: 138 MG/DL (ref 65–99)
HCT VFR BLD AUTO: 48.4 % (ref 37.5–51)
HGB BLD-MCNC: 16.1 G/DL (ref 13–17.7)
HOLD SPECIMEN: NORMAL
HOLD SPECIMEN: NORMAL
IMM GRANULOCYTES # BLD AUTO: 0.04 10*3/MM3 (ref 0–0.05)
IMM GRANULOCYTES NFR BLD AUTO: 0.4 % (ref 0–0.5)
LIPASE SERPL-CCNC: 38 U/L (ref 13–60)
LYMPHOCYTES # BLD AUTO: 2.03 10*3/MM3 (ref 0.7–3.1)
LYMPHOCYTES NFR BLD AUTO: 22 % (ref 19.6–45.3)
MAGNESIUM SERPL-MCNC: 2 MG/DL (ref 1.6–2.6)
MAGNESIUM SERPL-MCNC: 2.2 MG/DL (ref 1.6–2.6)
MCH RBC QN AUTO: 26.9 PG (ref 26.6–33)
MCHC RBC AUTO-ENTMCNC: 33.3 G/DL (ref 31.5–35.7)
MCV RBC AUTO: 80.9 FL (ref 79–97)
MONOCYTES # BLD AUTO: 0.67 10*3/MM3 (ref 0.1–0.9)
MONOCYTES NFR BLD AUTO: 7.3 % (ref 5–12)
NEUTROPHILS NFR BLD AUTO: 6.45 10*3/MM3 (ref 1.7–7)
NEUTROPHILS NFR BLD AUTO: 69.8 % (ref 42.7–76)
NRBC BLD AUTO-RTO: 0 /100 WBC (ref 0–0.2)
NT-PROBNP SERPL-MCNC: 286.3 PG/ML (ref 0–450)
PLATELET # BLD AUTO: 298 10*3/MM3 (ref 140–450)
PMV BLD AUTO: 10 FL (ref 6–12)
POTASSIUM SERPL-SCNC: 3.6 MMOL/L (ref 3.5–5.2)
POTASSIUM SERPL-SCNC: 4 MMOL/L (ref 3.5–5.2)
PROT SERPL-MCNC: 7.6 G/DL (ref 6–8.5)
QT INTERVAL: 350 MS
QT INTERVAL: 360 MS
QT INTERVAL: 379 MS
QTC INTERVAL: 458 MS
QTC INTERVAL: 469 MS
QTC INTERVAL: 474 MS
RBC # BLD AUTO: 5.98 10*6/MM3 (ref 4.14–5.8)
SODIUM SERPL-SCNC: 135 MMOL/L (ref 136–145)
SODIUM SERPL-SCNC: 136 MMOL/L (ref 136–145)
TROPONIN T SERPL HS-MCNC: 249 NG/L
TROPONIN T SERPL HS-MCNC: 876 NG/L
WBC NRBC COR # BLD AUTO: 9.24 10*3/MM3 (ref 3.4–10.8)
WHOLE BLOOD HOLD COAG: NORMAL
WHOLE BLOOD HOLD SPECIMEN: NORMAL

## 2025-03-04 PROCEDURE — 83735 ASSAY OF MAGNESIUM: CPT

## 2025-03-04 PROCEDURE — 82550 ASSAY OF CK (CPK): CPT | Performed by: INTERNAL MEDICINE

## 2025-03-04 PROCEDURE — 25010000002 MIDAZOLAM HCL (PF) 10 MG/2ML SOLUTION: Performed by: INTERNAL MEDICINE

## 2025-03-04 PROCEDURE — 83735 ASSAY OF MAGNESIUM: CPT | Performed by: EMERGENCY MEDICINE

## 2025-03-04 PROCEDURE — 92941 PRQ TRLML REVSC TOT OCCL AMI: CPT | Performed by: INTERNAL MEDICINE

## 2025-03-04 PROCEDURE — 93458 L HRT ARTERY/VENTRICLE ANGIO: CPT | Performed by: INTERNAL MEDICINE

## 2025-03-04 PROCEDURE — 25010000002 HEPARIN (PORCINE) PER 1000 UNITS: Performed by: INTERNAL MEDICINE

## 2025-03-04 PROCEDURE — C1725 CATH, TRANSLUMIN NON-LASER: HCPCS | Performed by: INTERNAL MEDICINE

## 2025-03-04 PROCEDURE — 93005 ELECTROCARDIOGRAM TRACING: CPT | Performed by: INTERNAL MEDICINE

## 2025-03-04 PROCEDURE — 25010000002 HEPARIN (PORCINE) PER 1000 UNITS: Performed by: EMERGENCY MEDICINE

## 2025-03-04 PROCEDURE — 71045 X-RAY EXAM CHEST 1 VIEW: CPT

## 2025-03-04 PROCEDURE — B2151ZZ FLUOROSCOPY OF LEFT HEART USING LOW OSMOLAR CONTRAST: ICD-10-PCS | Performed by: INTERNAL MEDICINE

## 2025-03-04 PROCEDURE — C1769 GUIDE WIRE: HCPCS | Performed by: INTERNAL MEDICINE

## 2025-03-04 PROCEDURE — 82553 CREATINE MB FRACTION: CPT | Performed by: INTERNAL MEDICINE

## 2025-03-04 PROCEDURE — C9606 PERC D-E COR REVASC W AMI S: HCPCS | Performed by: INTERNAL MEDICINE

## 2025-03-04 PROCEDURE — 84484 ASSAY OF TROPONIN QUANT: CPT | Performed by: INTERNAL MEDICINE

## 2025-03-04 PROCEDURE — 4A023N7 MEASUREMENT OF CARDIAC SAMPLING AND PRESSURE, LEFT HEART, PERCUTANEOUS APPROACH: ICD-10-PCS | Performed by: INTERNAL MEDICINE

## 2025-03-04 PROCEDURE — 99152 MOD SED SAME PHYS/QHP 5/>YRS: CPT | Performed by: INTERNAL MEDICINE

## 2025-03-04 PROCEDURE — 83690 ASSAY OF LIPASE: CPT | Performed by: EMERGENCY MEDICINE

## 2025-03-04 PROCEDURE — 027034Z DILATION OF CORONARY ARTERY, ONE ARTERY WITH DRUG-ELUTING INTRALUMINAL DEVICE, PERCUTANEOUS APPROACH: ICD-10-PCS | Performed by: INTERNAL MEDICINE

## 2025-03-04 PROCEDURE — C1874 STENT, COATED/COV W/DEL SYS: HCPCS | Performed by: INTERNAL MEDICINE

## 2025-03-04 PROCEDURE — 80053 COMPREHEN METABOLIC PANEL: CPT | Performed by: EMERGENCY MEDICINE

## 2025-03-04 PROCEDURE — 99291 CRITICAL CARE FIRST HOUR: CPT

## 2025-03-04 PROCEDURE — C1894 INTRO/SHEATH, NON-LASER: HCPCS | Performed by: INTERNAL MEDICINE

## 2025-03-04 PROCEDURE — 85025 COMPLETE CBC W/AUTO DIFF WBC: CPT | Performed by: EMERGENCY MEDICINE

## 2025-03-04 PROCEDURE — C1887 CATHETER, GUIDING: HCPCS | Performed by: INTERNAL MEDICINE

## 2025-03-04 PROCEDURE — 25010000002 METHYLPREDNISOLONE PER 125 MG: Performed by: INTERNAL MEDICINE

## 2025-03-04 PROCEDURE — 85347 COAGULATION TIME ACTIVATED: CPT

## 2025-03-04 PROCEDURE — 93005 ELECTROCARDIOGRAM TRACING: CPT | Performed by: EMERGENCY MEDICINE

## 2025-03-04 PROCEDURE — 25010000002 DIPHENHYDRAMINE PER 50 MG: Performed by: INTERNAL MEDICINE

## 2025-03-04 PROCEDURE — 84484 ASSAY OF TROPONIN QUANT: CPT | Performed by: EMERGENCY MEDICINE

## 2025-03-04 PROCEDURE — 93005 ELECTROCARDIOGRAM TRACING: CPT

## 2025-03-04 PROCEDURE — 83880 ASSAY OF NATRIURETIC PEPTIDE: CPT | Performed by: EMERGENCY MEDICINE

## 2025-03-04 PROCEDURE — 99223 1ST HOSP IP/OBS HIGH 75: CPT | Performed by: INTERNAL MEDICINE

## 2025-03-04 PROCEDURE — B2111ZZ FLUOROSCOPY OF MULTIPLE CORONARY ARTERIES USING LOW OSMOLAR CONTRAST: ICD-10-PCS | Performed by: INTERNAL MEDICINE

## 2025-03-04 PROCEDURE — 25010000002 HYDRALAZINE PER 20 MG: Performed by: INTERNAL MEDICINE

## 2025-03-04 PROCEDURE — 25510000001 IOPAMIDOL PER 1 ML: Performed by: INTERNAL MEDICINE

## 2025-03-04 PROCEDURE — 99153 MOD SED SAME PHYS/QHP EA: CPT | Performed by: INTERNAL MEDICINE

## 2025-03-04 PROCEDURE — 99285 EMERGENCY DEPT VISIT HI MDM: CPT

## 2025-03-04 PROCEDURE — 25010000002 FENTANYL CITRATE (PF) 50 MCG/ML SOLUTION: Performed by: INTERNAL MEDICINE

## 2025-03-04 DEVICE — XIENCE SKYPOINT™ EVEROLIMUS ELUTING CORONARY STENT SYSTEM 3.00 MM X 18 MM / RAPID-EXCHANGE
Type: IMPLANTABLE DEVICE | Site: HEART | Status: FUNCTIONAL
Brand: XIENCE SKYPOINT™

## 2025-03-04 RX ORDER — ATORVASTATIN CALCIUM 40 MG/1
40 TABLET, FILM COATED ORAL NIGHTLY
Status: DISCONTINUED | OUTPATIENT
Start: 2025-03-04 | End: 2025-03-06 | Stop reason: HOSPADM

## 2025-03-04 RX ORDER — NALOXONE HCL 0.4 MG/ML
0.4 VIAL (ML) INJECTION
Status: DISCONTINUED | OUTPATIENT
Start: 2025-03-04 | End: 2025-03-06 | Stop reason: HOSPADM

## 2025-03-04 RX ORDER — HEPARIN SODIUM 1000 [USP'U]/ML
INJECTION, SOLUTION INTRAVENOUS; SUBCUTANEOUS
Status: DISCONTINUED | OUTPATIENT
Start: 2025-03-04 | End: 2025-03-04 | Stop reason: HOSPADM

## 2025-03-04 RX ORDER — LOSARTAN POTASSIUM 50 MG/1
100 TABLET ORAL DAILY
Status: DISCONTINUED | OUTPATIENT
Start: 2025-03-04 | End: 2025-03-06 | Stop reason: HOSPADM

## 2025-03-04 RX ORDER — ASPIRIN 81 MG/1
81 TABLET ORAL DAILY
Status: DISCONTINUED | OUTPATIENT
Start: 2025-03-05 | End: 2025-03-06 | Stop reason: HOSPADM

## 2025-03-04 RX ORDER — ACETAMINOPHEN 325 MG/1
650 TABLET ORAL EVERY 4 HOURS PRN
Status: DISCONTINUED | OUTPATIENT
Start: 2025-03-04 | End: 2025-03-06 | Stop reason: HOSPADM

## 2025-03-04 RX ORDER — IOPAMIDOL 755 MG/ML
INJECTION, SOLUTION INTRAVASCULAR
Status: DISCONTINUED | OUTPATIENT
Start: 2025-03-04 | End: 2025-03-04 | Stop reason: HOSPADM

## 2025-03-04 RX ORDER — CLONIDINE HYDROCHLORIDE 0.1 MG/1
0.2 TABLET ORAL 2 TIMES DAILY
Status: DISCONTINUED | OUTPATIENT
Start: 2025-03-04 | End: 2025-03-05

## 2025-03-04 RX ORDER — MIDAZOLAM HYDROCHLORIDE 5 MG/ML
INJECTION, SOLUTION INTRAMUSCULAR; INTRAVENOUS
Status: DISCONTINUED | OUTPATIENT
Start: 2025-03-04 | End: 2025-03-04 | Stop reason: HOSPADM

## 2025-03-04 RX ORDER — METHYLPREDNISOLONE SODIUM SUCCINATE 125 MG/2ML
INJECTION, POWDER, LYOPHILIZED, FOR SOLUTION INTRAMUSCULAR; INTRAVENOUS
Status: DISCONTINUED | OUTPATIENT
Start: 2025-03-04 | End: 2025-03-04 | Stop reason: HOSPADM

## 2025-03-04 RX ORDER — LEVOTHYROXINE SODIUM 50 UG/1
50 TABLET ORAL
Status: DISCONTINUED | OUTPATIENT
Start: 2025-03-05 | End: 2025-03-06 | Stop reason: HOSPADM

## 2025-03-04 RX ORDER — NITROGLYCERIN 0.4 MG/1
0.4 TABLET SUBLINGUAL
Status: DISCONTINUED | OUTPATIENT
Start: 2025-03-04 | End: 2025-03-06 | Stop reason: HOSPADM

## 2025-03-04 RX ORDER — HYDROXYZINE HYDROCHLORIDE 10 MG/1
10 TABLET, FILM COATED ORAL 3 TIMES DAILY PRN
Status: DISCONTINUED | OUTPATIENT
Start: 2025-03-04 | End: 2025-03-06 | Stop reason: HOSPADM

## 2025-03-04 RX ORDER — ONDANSETRON 4 MG/1
4 TABLET, ORALLY DISINTEGRATING ORAL EVERY 6 HOURS PRN
Status: DISCONTINUED | OUTPATIENT
Start: 2025-03-04 | End: 2025-03-06 | Stop reason: HOSPADM

## 2025-03-04 RX ORDER — DIPHENHYDRAMINE HYDROCHLORIDE 50 MG/ML
INJECTION INTRAMUSCULAR; INTRAVENOUS
Status: DISCONTINUED | OUTPATIENT
Start: 2025-03-04 | End: 2025-03-04 | Stop reason: HOSPADM

## 2025-03-04 RX ORDER — HYDRALAZINE HYDROCHLORIDE 50 MG/1
25 TABLET, FILM COATED ORAL EVERY 12 HOURS SCHEDULED
Status: DISCONTINUED | OUTPATIENT
Start: 2025-03-04 | End: 2025-03-04

## 2025-03-04 RX ORDER — ONDANSETRON 2 MG/ML
4 INJECTION INTRAMUSCULAR; INTRAVENOUS EVERY 6 HOURS PRN
Status: DISCONTINUED | OUTPATIENT
Start: 2025-03-04 | End: 2025-03-06 | Stop reason: HOSPADM

## 2025-03-04 RX ORDER — SODIUM CHLORIDE 0.9 % (FLUSH) 0.9 %
10 SYRINGE (ML) INJECTION AS NEEDED
Status: DISCONTINUED | OUTPATIENT
Start: 2025-03-04 | End: 2025-03-06 | Stop reason: HOSPADM

## 2025-03-04 RX ORDER — GABAPENTIN 400 MG/1
400 CAPSULE ORAL NIGHTLY
Status: DISCONTINUED | OUTPATIENT
Start: 2025-03-04 | End: 2025-03-06 | Stop reason: HOSPADM

## 2025-03-04 RX ORDER — AMLODIPINE BESYLATE 10 MG/1
10 TABLET ORAL DAILY
Status: DISCONTINUED | OUTPATIENT
Start: 2025-03-05 | End: 2025-03-06 | Stop reason: HOSPADM

## 2025-03-04 RX ORDER — FENTANYL CITRATE 50 UG/ML
INJECTION, SOLUTION INTRAMUSCULAR; INTRAVENOUS
Status: DISCONTINUED | OUTPATIENT
Start: 2025-03-04 | End: 2025-03-04 | Stop reason: HOSPADM

## 2025-03-04 RX ORDER — CARVEDILOL 3.12 MG/1
3.12 TABLET ORAL 2 TIMES DAILY WITH MEALS
Status: DISCONTINUED | OUTPATIENT
Start: 2025-03-04 | End: 2025-03-04

## 2025-03-04 RX ORDER — HYDRALAZINE HYDROCHLORIDE 20 MG/ML
10 INJECTION INTRAMUSCULAR; INTRAVENOUS EVERY 4 HOURS PRN
Status: DISCONTINUED | OUTPATIENT
Start: 2025-03-04 | End: 2025-03-06 | Stop reason: HOSPADM

## 2025-03-04 RX ORDER — HEPARIN SODIUM 5000 [USP'U]/ML
5000 INJECTION, SOLUTION INTRAVENOUS; SUBCUTANEOUS ONCE
Status: COMPLETED | OUTPATIENT
Start: 2025-03-04 | End: 2025-03-04

## 2025-03-04 RX ORDER — ASPIRIN 81 MG/1
324 TABLET, CHEWABLE ORAL ONCE
Status: DISCONTINUED | OUTPATIENT
Start: 2025-03-04 | End: 2025-03-04

## 2025-03-04 RX ORDER — AMLODIPINE BESYLATE 5 MG/1
10 TABLET ORAL ONCE
Status: COMPLETED | OUTPATIENT
Start: 2025-03-04 | End: 2025-03-04

## 2025-03-04 RX ORDER — HYDRALAZINE HYDROCHLORIDE 50 MG/1
50 TABLET, FILM COATED ORAL EVERY 12 HOURS SCHEDULED
Status: DISCONTINUED | OUTPATIENT
Start: 2025-03-04 | End: 2025-03-06 | Stop reason: HOSPADM

## 2025-03-04 RX ORDER — CLONIDINE HYDROCHLORIDE 0.1 MG/1
0.2 TABLET ORAL ONCE
Status: COMPLETED | OUTPATIENT
Start: 2025-03-04 | End: 2025-03-04

## 2025-03-04 RX ORDER — HYDRALAZINE HYDROCHLORIDE 20 MG/ML
INJECTION INTRAMUSCULAR; INTRAVENOUS
Status: DISCONTINUED | OUTPATIENT
Start: 2025-03-04 | End: 2025-03-04 | Stop reason: HOSPADM

## 2025-03-04 RX ORDER — CARVEDILOL 3.12 MG/1
3.12 TABLET ORAL 2 TIMES DAILY WITH MEALS
Status: DISCONTINUED | OUTPATIENT
Start: 2025-03-04 | End: 2025-03-05

## 2025-03-04 RX ADMIN — HYDRALAZINE HYDROCHLORIDE 25 MG: 50 TABLET ORAL at 13:15

## 2025-03-04 RX ADMIN — ATORVASTATIN CALCIUM 40 MG: 40 TABLET, FILM COATED ORAL at 21:05

## 2025-03-04 RX ADMIN — CLONIDINE HYDROCHLORIDE 0.2 MG: 0.1 TABLET ORAL at 13:17

## 2025-03-04 RX ADMIN — GABAPENTIN 400 MG: 400 CAPSULE ORAL at 21:05

## 2025-03-04 RX ADMIN — FLUOXETINE HYDROCHLORIDE 60 MG: 20 CAPSULE ORAL at 21:05

## 2025-03-04 RX ADMIN — HYDRALAZINE HYDROCHLORIDE 50 MG: 50 TABLET ORAL at 21:05

## 2025-03-04 RX ADMIN — Medication 10 ML: at 21:04

## 2025-03-04 RX ADMIN — HYDRALAZINE HYDROCHLORIDE 10 MG: 20 INJECTION INTRAMUSCULAR; INTRAVENOUS at 18:17

## 2025-03-04 RX ADMIN — LOSARTAN POTASSIUM 100 MG: 50 TABLET, FILM COATED ORAL at 13:17

## 2025-03-04 RX ADMIN — CLONIDINE HYDROCHLORIDE 0.2 MG: 0.1 TABLET ORAL at 21:04

## 2025-03-04 RX ADMIN — HEPARIN SODIUM 5000 UNITS: 5000 INJECTION INTRAVENOUS; SUBCUTANEOUS at 10:20

## 2025-03-04 RX ADMIN — TICAGRELOR 90 MG: 90 TABLET ORAL at 21:05

## 2025-03-04 RX ADMIN — AMLODIPINE BESYLATE 10 MG: 5 TABLET ORAL at 13:16

## 2025-03-04 RX ADMIN — CARVEDILOL 3.12 MG: 3.12 TABLET, FILM COATED ORAL at 14:36

## 2025-03-04 NOTE — CONSULTS
03/04/25 0948   Coping/Psychosocial   Observed Emotional State calm;shocked   Verbalized Emotional State disbelief;acceptance   Trust Relationship/Rapport thoughts/feelings acknowledged;emotional support provided   Family/Support Persons other (see comments)  (brother has been notified)   Additional Documentation Spiritual Care (Group)   Spiritual Care   Spiritual Care Source nurse referral   Spiritual Care Follow-Up other (see comments)  (pt is going to cath lab.  will continue to support his family when they arrive.)   Response to Spiritual Care receptive of support;laughter  (Pt shares that we he is nervous he likes to make a lot of jokes.)   Spiritual Care Visit Type other (see comments)  (stemi alert to trauma 1)   Spiritual Care Request procedure preparation support

## 2025-03-04 NOTE — ED NOTES
Patient to ED from home via [facility] for CP that's been going on for the last 3 days. EMS administered 324 asa, 0.4 Nitro, 50mcg fent en route.

## 2025-03-04 NOTE — Clinical Note
First balloon inflation max pressure = 12 minerva. First balloon inflation duration = 20 seconds. Second inflation of balloon - Max pressure = 12 minerva. 2nd Inflation of balloon - Duration = 5 seconds.

## 2025-03-04 NOTE — CONSULTS
Pulmonary / Critical Care Consult Note      Patient Name: Anthony Tay  : 1990  MRN: [id number]  Primary Care Physician:  Melly Tamayo APRN  Referring Physician: RHODA Luis  Date of admission: 3/4/2025    Subjective   Subjective     Reason for Consult/ Chief Complaint: Inferior ST elevation MI, status post PCI with stent placement, postprocedure ICU care    HPI:  Anthony Tay is a 34 y.o. male with history of hypertension, psychiatric disorder, presented to the hospital with chest pain for few days, left-sided heaviness, shortness of breath, left arm pain.  EKG done in the ER showed ST elevation in inferior leads with reciprocal changes in lateral leads.  Patient was taken to Cath Lab, was found to have 99% stenosis involving distal right coronary artery.  He underwent primary angioplasty with drug-eluting stent in distal right coronary artery.  Patient is in intensive care unit postoperatively.  Pulmonary critical services involved for extensive management of his care in ICU.  He is a chronic smoker, smoking half to 1 pack a day.  He has significant hypertension.  He has obstructive sleep apnea but does not use CPAP machine.  Has no nausea or vomiting.  His chest pain is better since procedure.    Review of Systems  General:  Fatigue, No Fever  HEENT: No dysphagia, No Visual Changes, no rhinorrhea  Respiratory: No cough,+Dyspnea, No Pleuritic Pain, no wheezing, no hemoptysis  Cardiovascular: Denies chest pain, denies palpitations,+EVERETT, No Chest Pressure  Gastrointestinal:  No Abdominal Pain, No Nausea, No Vomiting, No Diarrhea  Genitourinary:  No Dysuria, No Frequency, No Hesitancy  Musculoskeletal: No muscle pain or swelling  Endocrine:  No Heat Intolerance, No Cold Intolerance, No Fatigue  Hematologic:  No Bleeding, No Bruising  Psychiatric:  No Anxiety, No Depression  Neurologic:  No Confusion, no Dysarthria, No Headaches  Skin:  No Rash, No Open Wounds        Personal History      Past Medical History:   Diagnosis Date    Allergic rhinitis     Anxiety     Asperger's syndrome     Colon polyps 07/18/2014    Depression     GERD (gastroesophageal reflux disease) 01/23/2015    Hematuria, microscopic 03/25/2014    3/25//2014 large blood, > 300 protein u/a in office on repeat from outpt labs.    Hypertension     Microscopic hematuria 3/25/2014    Resistant hypertension 02/03/2020    Tourette's     Tourette's disorder 03/06/2014       Past Surgical History:   Procedure Laterality Date    COLONOSCOPY  07/18/2014    CYSTOSCOPY  04/2014    WNL    POLYPECTOMY  07/18/2014       Family History: family history includes Diabetes type II in an other family member; Mental illness in his mother; No Known Problems in his father; Stroke in an other family member. Otherwise pertinent FHx was reviewed.     Social History:  reports that he has been smoking cigarettes. He started smoking about 16 years ago. He has a 0.8 pack-year smoking history. He has been exposed to tobacco smoke. He has never used smokeless tobacco. He reports that he does not currently use drugs. He reports that he does not drink alcohol.    Home Medications:  FLUoxetine, amLODIPine, cloNIDine, diclofenac, flecainide, gabapentin, hydrALAZINE, hydrOXYzine, levothyroxine, losartan, and paliperidone    Allergies:  Allergies   Allergen Reactions    Shellfish Allergy Swelling    Morphine Other (See Comments)     unk       Objective    Objective     Vitals:   Temp:  [97.9 °F (36.6 °C)-98.5 °F (36.9 °C)] 97.9 °F (36.6 °C)  Heart Rate:  [] 92  Resp:  [18] 18  BP: (152-187)/() 152/99    Physical Exam:  Vital Signs Reviewed   Morbidly obese male, in no acute distress, normal conversational  HEENT:  PERRL, EOMI.  OP, nares clear, no sinus tenderness  Neck:  Supple, no JVD, no thyromegaly  Lymph: no axillary, cervical, supraclavicular lymphadenopathy noted bilaterally  Chest:  good aeration, clear to auscultation bilaterally, tympanic to  percussion bilaterally, no work of breathing noted  CV: RRR, no MGR, pulses 2+, equal  Abd:  Soft, NT, ND, + BS, no HSM  EXT:  no clubbing, no cyanosis, no edema, no joint tenderness  Neuro:  A&Ox3, CN grossly intact, no focal deficits  Skin: No rashes or lesions noted      Result Review    Result Review:  I have personally reviewed the results from the time of this admission to 3/4/2025 15:17 EST and agree with these findings:  [x]  Laboratory  [x]  Microbiology  [x]  Radiology  [x]  EKG/Telemetry   [x]  Cardiology/Vascular   []  Pathology  []  Old records  []  Other:  Most notable findings include:         Lab 03/04/25  1326 03/04/25  0935   WBC  --  9.24   HEMOGLOBIN  --  16.1   HEMATOCRIT  --  48.4   PLATELETS  --  298   SODIUM 136 135*   POTASSIUM 4.0 3.6   CHLORIDE 103 102   CO2 20.5* 22.0   BUN 10 10   CREATININE 1.26 1.18   GLUCOSE 138* 114*   CALCIUM 9.5 8.8   TOTAL PROTEIN  --  7.6   ALBUMIN  --  3.9   GLOBULIN  --  3.7     XR Chest 1 View    Result Date: 3/4/2025  XR CHEST 1 VW Date of Exam: 3/4/2025 12:38 PM EST Indication: Chest Pain Triage Protocol Comparison: [facility] chest x-ray 7/19/2021 Findings: Lungs are adequately expanded and appear clear. No pneumothorax or large pleural effusion is seen. Cardiomediastinal contours appear stable.     Impression: Impression: No acute cardiopulmonary abnormality is identified. Electronically Signed: Maribeth Campos  3/4/2025 1:15 PM EST  Workstation ID: [id number]            Assessment & Plan   Assessment / Plan     Active Hospital Problems:  Active Hospital Problems    Diagnosis     **STEMI (ST elevation myocardial infarction)          Impression:  Acute inferior wall MI  Status post percutaneous coronary artery intervention, with stent placement in right coronary artery  Resistant hypertension  Likely obstructive sleep apnea  Psychiatric disorder  Morbid obesity with a BMI of 44.13    Plan:  Patient is status post PCI and stenting to right coronary artery.  Postop  cardiac care per cardiology service.  Continue with aspirin 81 mg once daily.  Continue with Brilinta 90 mg twice daily.  Check lipid profile.  Continue with Lipitor 40 mg once daily.  Continue with Coreg 3.25 mg twice daily.  Start  Needs to lose weight.  Lifestyle modification was discussed in detail.  Continue with Cozaar 100 mg orally daily, amlodipine 10 mg once daily, Coreg and clonidine.  Continue Synthroid  I discussed with him regarding need of sleep study.  Patient willing to have sleep study as outpatient.   Nicotine patch if patient willing.   Check A1c, lipid panel.     I have reviewed labs, imaging, pertinent clinical data and provider notes.   I have discussed with bedside nurse and primary service.       Electronically signed by Cole Ruiz MD, 3/4/2025, 15:17 EST.

## 2025-03-04 NOTE — PLAN OF CARE
Goal Outcome Evaluation:  Plan of Care Reviewed With: patient        Progress: no change  Outcome Evaluation: pt admitted to unit from cath lab. pt hypertensive upon arrival. no complications w/ removal of air from TR band. after administration of BP medications, pt experienced symptomatic gagan cardia. MD was notified, labs were obtained, EKG obtained. pt reported feeling back to normal after 45 mins. BPs remain elevated, but stable. no acute events since

## 2025-03-04 NOTE — Clinical Note
First balloon inflation max pressure = 16 minerva. First balloon inflation duration = 8 seconds. Second inflation of balloon - Max pressure = 16 minerva. 2nd Inflation of balloon - Duration = 8 seconds.

## 2025-03-04 NOTE — Clinical Note
1210 
Received client from PACU in satisfactory condition. Client is a pt of Dr. Laurie Cruz. Pt had a Right Total Hip Replacement today. Client is A/O X 4. Client is calm and cooperative. Clients PERRLA. Denies numbness or tingling to any extremity. With + Posterior Tibial and Dorsalis Pedis pulses. Capillary Refill less than 3 seconds. Skin is warm , dry and skin color is appropriate to race. Client is negative for JVD. Bibasilar breath sounds clear. No use of accessory muscles. Bowel sounds hypoactive to all quadrants. Abdomen is obese, soft and non-tender. Client has not voided post-operatively. No bladder distention evident. No complaints of bladder discomfort. Client has a Mepilex silver dressing to the right Hip. Dressing is dry and intact. Claudeen Cristina hose applied to both legs. Pt has +1 pitting edema to right leg and +2 pitting devon. Sequential compression device applied. Client has LH 18 gauge PIV present and running NSS at 300 ml/hr. . Clients pain is 7/10 on 0-10 scale. Client oriented to call bell use as well as bed use. Client oriented to phone and how to order meals. Call bell within reach. Bed in low position. Three side rails up. Dual skin check done with  Timoteo Carter RN. No skin breakdown noted. 1:49 PM  
Pt ambulated to outside the door. Pt vomited 200 ml of liquids. Pt back in bed.  
1405 Pt resting in bed. Nausea resolved as per pt.  
3:24 PM 
 Pt is awake, alert, oriented x 4. Resting in bed. Pain level 4/10. Using IS up to 1500. No n/v noted.   
5:21 PM 
 Pt ambulated and voided in BR then sat on chair. [address] Pt eating dinner seated on chair. Family at bedside. ACT was drawn at 10:25 EST.

## 2025-03-04 NOTE — H&P
[facility]   CARDIOLOGY HISTORY AND PHYSICAL    Patient Name: Anthony Tay  : 1990  MRN: [id number]  Primary Care Physician:  Melly Tamayo APRN  Date of admission: 3/4/2025    Subjective   Subjective     Chief Complaint: Chest pain    HPI:    Anthony Tay is a 34 y.o. male with hypertension which is difficult to control, psychiatric disorder unspecified on medications.  He presents emergency room because of chest pain a few days duration, worse this morning.  Pain was present mostly on the left side of the chest, described as heaviness, associated with a severe squeezing sensation of the left arm.  He was unable to take a deep breath due to pain.  There were no palpitations or dizziness.  EMS was called due to worsening symptoms this morning.  EKG done by the EMS and also in the ED showed ST segment elevations inferior leads with reciprocal changes in the lateral leads, consistent with acute inferior STEMI.  He received IV heparin and aspirin and was emergently transferred to Cath Lab.    On arrival to Cath Lab he was hemodynamically stable blood pressure was on the higher side.  He reported 5/10 chest discomfort, which improved after getting sublingual nitroglycerin.  He received IV hydralazine and IV Lopressor for high blood pressure and tachycardia.  Cardiac catheterization showed 99% stenosis involving distal right coronary artery, resulting in CARMEN II flow to right PDA and PLV branches.  This appears to be the culprit lesion for patient's presentation with STEMI.  He was also noted to have nonobstructive and borderline lesions in left circumflex artery.  He underwent primary angioplasty with drug-eluting stent placement to distal right coronary artery into the PLV branch restoring CARMEN-3 flow.  Patient's symptoms improved at the end of the procedure.    Throughout the procedure, his blood pressure remained high and received multiple dose of IV hydralazine.    Review of  Systems   All systems were reviewed and negative except for: Per HPI    Personal History     Past Medical History:   Diagnosis Date    Allergic rhinitis     Anxiety     Asperger's syndrome     Colon polyps 07/18/2014    Depression     GERD (gastroesophageal reflux disease) 01/23/2015    Hematuria, microscopic 03/25/2014    3/25//2014 large blood, > 300 protein u/a in office on repeat from outpt labs.    Hypertension     Microscopic hematuria 3/25/2014    Resistant hypertension 02/03/2020    Tourette's     Tourette's disorder 03/06/2014       Family History: family history includes Diabetes type II in an other family member; Mental illness in his mother; No Known Problems in his father; Stroke in an other family member. Otherwise pertinent FHx was reviewed and not pertinent to current issue.    Social History:  reports that he has been smoking cigarettes. He started smoking about 16 years ago. He has a 0.8 pack-year smoking history. He has been exposed to tobacco smoke. He has never used smokeless tobacco. He reports that he does not currently use drugs. He reports that he does not drink alcohol.    Home Medications:  Blood Pressure Cuff, FLUoxetine, amLODIPine, cloNIDine, diclofenac, flecainide, gabapentin, hydrALAZINE, hydrOXYzine, levothyroxine, losartan, and paliperidone      Allergies:  Allergies   Allergen Reactions    Shellfish Allergy Swelling    Morphine Other (See Comments)     unk       Objective   Objective     Vitals:   Temp:  [98.5 °F (36.9 °C)] 98.5 °F (36.9 °C)  Heart Rate:  [113] 113  Resp:  [18] 18  BP: (183)/(99) 183/99  Physical Exam    Constitutional: Awake, alert, in mild distress because of pain, anxious, talkative   Eyes: PERRLA, sclerae anicteric, no conjunctival injection   HENT: NCAT, mucous membranes moist   Neck: Supple, no thyromegaly, no lymphadenopathy, trachea midline   Respiratory: Clear to auscultation bilaterally, nonlabored respirations    Cardiovascular: RRR, no murmurs, rubs, or  gallops, palpable pedal pulses bilaterally   Gastrointestinal: Positive bowel sounds, soft, nontender, nondistended   Musculoskeletal: No bilateral ankle edema, no clubbing or cyanosis to extremities   Psychiatric: Appropriate affect, cooperative   Neurologic: Oriented x 3, pressured speech   Skin: No rashes     Result Review    Result Review:  I have personally reviewed the results from the time of this admission to 3/4/2025 11:28 EST and agree with these findings:  [x]  Laboratory  []  Microbiology  [x]  Radiology  [x]  EKG/Telemetry   [x]  Cardiology/Vascular   []  Pathology  [x]  Old records  []  Other:  Most notable findings include:          Latest Reference Range & Units 03/04/25 09:35   Creatine Kinase 20 - 200 U/L 187   CKMB <=10.40 ng/mL 14.90 (H)   HS Troponin T <22 ng/L 249 (C)   proBNP 0.0 - 450.0 pg/mL 286.3     CMP          3/4/2025    09:35   CMP   Glucose 114    BUN 10    Creatinine 1.18    EGFR 83.0    Sodium 135    Potassium 3.6    Chloride 102    Calcium 8.8    Total Protein 7.6    Albumin 3.9    Globulin 3.7    Total Bilirubin 0.5    Alkaline Phosphatase 112    AST (SGOT) 61    ALT (SGPT) 24    Albumin/Globulin Ratio 1.1    BUN/Creatinine Ratio 8.5    Anion Gap 11.0       Assessment & Plan   Assessment / Plan     Brief Patient Summary:  Anthony Tay is a 34 y.o. male with hypertension which is difficult to control, psychiatric disorder unspecified on medications, presented with chest pain and noted to have inferior STEMI    Active Hospital Problems:  Active Hospital Problems    Diagnosis     **STEMI (ST elevation myocardial infarction)      Inferior STEMI ; culprit lesion is 99% hazy lesion of the distal right coronary artery.  He underwent primary angioplasty with stent placement restoring CARMEN-3 flow.  LCx artery was noted to have nonobstructive and borderline lesions.  LV ejection fraction is close to normal at 50%.  He was loaded with aspirin and Brilinta in the Cath Lab.    Resistant  hypertension ; blood pressure not well-controlled  Psychiatric disorder, unspecified    Plan:     Continue dual antiplatelet therapy with aspirin and Brilinta  Will initiate high intensity statins  Starting beta-blockers, dose to be titrated up as tolerated  Restart all home antihypertensive medications now  Echocardiogram to assess LV function and valvular function    Further management in the intensive care unit    VTE Prophylaxis:  Pharmacologic VTE prophylaxis orders are present.        CODE STATUS:       Admission Status:  I believe this patient meets inpatient status.    Electronically signed by Misha Bailey MD, 03/04/25, 11:28 AM EST.

## 2025-03-04 NOTE — ED PROVIDER NOTES
Time: 9:43 AM EST  Date of encounter:  3/4/2025  Independent Historian/Clinical History and Information was obtained by:   Patient    History is limited by: N/A    Chief Complaint: Chest pain      History of Present Illness:  Patient is a 34 y.o. year old male who presents to the emergency department for evaluation of chest pain.  Patient reports several days of persistent chest pressure which she describes as an elephant sitting on his chest.  He also has radiation to his left arm and feels like he cannot take a deep breath.  Patient does have history of hypertension and states he is compliant with his medication.      Patient Care Team  Primary Care Provider: Melly Tamayo APRN    Past Medical History:     Allergies   Allergen Reactions    Shellfish Allergy Swelling    Morphine Other (See Comments)     unk     Past Medical History:   Diagnosis Date    Allergic rhinitis     Anxiety     Asperger's syndrome     Colon polyps 07/18/2014    Depression     GERD (gastroesophageal reflux disease) 01/23/2015    Hematuria, microscopic 03/25/2014    3/25//2014 large blood, > 300 protein u/a in office on repeat from outpt labs.    Hypertension     Microscopic hematuria 3/25/2014    Resistant hypertension 02/03/2020    Tourette's     Tourette's disorder 03/06/2014     Past Surgical History:   Procedure Laterality Date    COLONOSCOPY  07/18/2014    CYSTOSCOPY  04/2014    WNL    POLYPECTOMY  07/18/2014     Family History   Problem Relation Age of Onset    Mental illness Mother     No Known Problems Father     Stroke Other     Diabetes type II Other        Home Medications:  Prior to Admission medications    Medication Sig Start Date End Date Taking? Authorizing Provider   amLODIPine (NORVASC) 10 MG tablet Take 1 tablet by mouth Daily. 10/28/24   Hillary Worthington APRN   Blood Pressure Monitoring (Blood Pressure Cuff) misc Use as directed to monitor blood pressure once daily 1/10/24   Hillary Worthington APRN   cloNIDine  (CATAPRES) 0.2 MG tablet Take 1 tablet by mouth 2 (Two) Times a Day. 10/28/24   Hillary Worthington APRN   diclofenac (VOLTAREN) 75 MG EC tablet Take 1 tablet by mouth 2 (Two) Times a Day. 10/28/24   Hillary Worthington APRN   flecainide (TAMBOCOR) 50 MG tablet Take 1 tablet by mouth Every 12 (Twelve) Hours. 10/28/24   Hillary Worthington APRN   FLUoxetine (PROzac) 20 MG capsule Take 1 capsule by mouth Daily for 90 days. Take with 40mg for total dose of 60mg. 2/10/25 5/11/25  Melly Giang PA-C   FLUoxetine (PROzac) 40 MG capsule Take 1 capsule by mouth Every Morning for 90 days. Take with 20mg cap for total dose of 60mg 2/10/25 5/11/25  Melly Giang PA-C   gabapentin (Neurontin) 800 MG tablet Take 1 tablet by mouth Every Night for 90 days. 2/10/25 5/11/25  Melly Giang PA-C   hydrALAZINE (APRESOLINE) 50 MG tablet TAKE 2 TABLETS BY MOUTH EVERY MORNING AND 1 TABLET EVERY EVENING 10/28/24   Hillary Worthington APRN   hydrOXYzine (ATARAX) 25 MG tablet TAKE 1 TO 2 TABLETS BY MOUTH ONCE DAILY AS NEEDED SEVERE  ANXIETY 2/10/25   Melly Giang PA-C   levothyroxine (SYNTHROID, LEVOTHROID) 50 MCG tablet Take 1 tablet by mouth Daily. 10/28/24   Hillary Worthington APRN   losartan (COZAAR) 100 MG tablet Take 1 tablet by mouth Daily. 10/28/24   Hillary Worthington APRN   paliperidone (INVEGA) 9 MG 24 hr tablet Take 1 tablet by mouth Every Morning. 2/10/25   Melly Giang PA-C        Social History:   Social History     Tobacco Use    Smoking status: Some Days     Current packs/day: 0.05     Average packs/day: 0.1 packs/day for 16.2 years (0.8 ttl pk-yrs)     Types: Cigarettes     Start date: 2009     Passive exposure: Current    Smokeless tobacco: Never    Tobacco comments:     Stopped smoking at age 29; social smoker   Vaping Use    Vaping status: Never Used   Substance Use Topics    Alcohol use: Never    Drug use: Not Currently         Review of Systems:  Review of Systems   Respiratory:  Positive for chest tightness and  "shortness of breath.         Physical Exam:  BP (!) 183/99 (BP Location: Left arm, Patient Position: Lying)   Pulse 113   Temp 98.5 °F (36.9 °C) (Oral)   Resp 18   Ht 194.9 cm (76.75\")   Wt (!) 168 kg (369 lb 11.4 oz)   SpO2 98%   BMI 44.13 kg/m²     Physical Exam  Vitals and nursing note reviewed.   Constitutional:       General: He is not in acute distress.  HENT:      Head: Normocephalic.   Cardiovascular:      Rate and Rhythm: Normal rate and regular rhythm.      Heart sounds: Normal heart sounds.   Pulmonary:      Effort: Pulmonary effort is normal. No respiratory distress.      Breath sounds: Normal breath sounds.   Abdominal:      General: Abdomen is flat.      Palpations: Abdomen is soft.      Tenderness: There is no abdominal tenderness.   Musculoskeletal:         General: Normal range of motion.      Cervical back: Normal range of motion.   Skin:     General: Skin is warm and dry.   Neurological:      Mental Status: He is alert and oriented to person, place, and time. Mental status is at baseline.                    Medical Decision Making:      Comorbidities that affect care:    Hypertension    External Notes reviewed:    Previous Clinic Note: Patient seen 6/25/2024 by PCP for hypertension      The following orders were placed and all results were independently analyzed by me:  Orders Placed This Encounter   Procedures    XR Chest 1 View    [place] Draw    High Sensitivity Troponin T    Comprehensive Metabolic Panel    Lipase    BNP    Magnesium    CBC Auto Differential    NPO Diet NPO Type: Strict NPO    Undress & Gown    Continuous Pulse Oximetry    Oxygen Therapy- Nasal Cannula; Titrate 1-6 LPM Per SpO2; 90 - 95%    ECG 12 Lead ED Triage Standing Order; Chest Pain    ECG 12 Lead ED Triage Standing Order; Chest Pain    Insert Peripheral IV    Inpatient Admission    CBC & Differential    Green Top (Gel)    Lavender Top    Gold Top - SST    Light Blue Top    Send To Cath Lab       Medications Given " in the Emergency Department:  Medications   sodium chloride 0.9 % flush 10 mL (has no administration in time range)   aspirin chewable tablet 324 mg (324 mg Oral Not Given 3/4/25 0941)   diphenhydrAMINE (BENADRYL) injection (50 mg Intravenous Given 3/4/25 0950)   heparin (porcine) 5000 UNIT/ML injection 5,000 Units (has no administration in time range)   methylPREDNISolone sodium succinate (SOLU-Medrol) injection (125 mg Intravenous Given 3/4/25 0950)   Midazolam HCl (PF) (VERSED) injection (1 mg Intravenous Given 3/4/25 0957)   fentaNYL citrate (PF) (SUBLIMAZE) injection (25 mcg Intravenous Given 3/4/25 0954)        ED Course:    ED Course as of 03/04/25 0957   Tue Mar 04, 2025   0932 EKG:    Rhythm: Sinus tachycardia  Rate: 108  Intervals: Normal  T-wave: Normal  ST Segment: ST elevations in 2,3, aVF with reciprocal ST depression changes in aVL    EKG Comparison: Not available    Interpreted by me   [NL]      ED Course User Index  [NL] Agustín Ortiz DO       Labs:    Lab Results (last 24 hours)       Procedure Component Value Units Date/Time    High Sensitivity Troponin T [[id number]] Collected: 03/04/25 0935    Specimen: Blood Updated: 03/04/25 0942    CBC & Differential [[id number]]  (Abnormal) Collected: 03/04/25 0935    Specimen: Blood Updated: 03/04/25 0946    Narrative:      The following orders were created for panel order CBC & Differential.  Procedure                               Abnormality         Status                     ---------                               -----------         ------                     CBC Auto Differential[[id number]]        Abnormal            Final result                 Please view results for these tests on the individual orders.    Comprehensive Metabolic Panel [[id number]] Collected: 03/04/25 0935    Specimen: Blood Updated: 03/04/25 0942    Lipase [[id number]] Collected: 03/04/25 0935    Specimen: Blood Updated: 03/04/25 0942    BNP [[id number]] Collected: 03/04/25 0935     Specimen: Blood Updated: 03/04/25 0942    Magnesium [[id number]] Collected: 03/04/25 0935    Specimen: Blood Updated: 03/04/25 0942    CBC Auto Differential [[id number]]  (Abnormal) Collected: 03/04/25 0935    Specimen: Blood Updated: 03/04/25 0946     WBC 9.24 10*3/mm3      RBC 5.98 10*6/mm3      Hemoglobin 16.1 g/dL      Hematocrit 48.4 %      MCV 80.9 fL      MCH 26.9 pg      MCHC 33.3 g/dL      RDW 14.4 %      RDW-SD 42.2 fl      MPV 10.0 fL      Platelets 298 10*3/mm3      Neutrophil % 69.8 %      Lymphocyte % 22.0 %      Monocyte % 7.3 %      Eosinophil % 0.4 %      Basophil % 0.1 %      Immature Grans % 0.4 %      Neutrophils, Absolute 6.45 10*3/mm3      Lymphocytes, Absolute 2.03 10*3/mm3      Monocytes, Absolute 0.67 10*3/mm3      Eosinophils, Absolute 0.04 10*3/mm3      Basophils, Absolute 0.01 10*3/mm3      Immature Grans, Absolute 0.04 10*3/mm3      nRBC 0.0 /100 WBC              Imaging:    No Radiology Exams Resulted Within Past 24 Hours      Differential Diagnosis and Discussion:    Chest Pain:  Based on the patient's signs and symptoms, I considered aortic dissection, myocardial infaction, pulmonary embolism, cardiac tamponade, pericarditis, pneumothorax, musculoskeletal chest pain and other differential diagnosis as an etiology of the patient's chest pain.     PROCEDURES:    An EKG was performed and the EKG was interpreted by me.    ECG 12 Lead ED Triage Standing Order; Chest Pain   Preliminary Result   HEART EPUU=306  bpm   RR Pxvlzxmp=400  ms   AL Ydcfttat=857  ms   P Horizontal Axis=24  deg   P Front Axis=61  deg   QRSD Interval=94  ms   QT Vnhrnwcf=359  ms   SXmO=380  ms   QRS Axis=22  deg   T Wave Axis=83  deg   - ABNORMAL ECG -   Sinus tachycardia   Borderline prolonged AL interval   Inferior infarct, acute (RCA)   Anteroseptal infarct, old   Date and Time of Study:2025-03-04 09:32:42          Procedures    MDM  Patient has evidence of an inferior wall   STEMI on EKG.  Patient was discussed with  Dr. Mullins, interventional cardiology, who will take the patient to the Cath Lab.  He request the patient get a heparin 5000 unit bolus.  Patient received aspirin 324 mg, sublingual nitroglycerin and fentanyl enroute.  Patient's chest pain improved from an 8 out of 10 to 4 out of 10.        Total Critical Care time of 35 minutes. Total critical care time documented does not include time spent on separately billed procedures for services of nurses or physician assistants. I personally saw and examined the patient. I have reviewed all diagnostic interpretations and treatment plans as written. I was present for the key portions of any procedures performed and the inclusive time noted in any critical care statement. Critical care time includes patient management by me, time spent at the patients bedside,  time to review lab and imaging results, discussing patient care, documentation in the medical record, and time spent with family or caregiver.          Patient Care Considerations:        Consultants/Shared Management Plan:    Patient discussed with Dr. Mullins, interventional cardiology.    Social Determinants of Health:    Patient is independent, reliable, and has access to care.       Disposition and Care Coordination:  Patient going to the Cath Lab from the ER.  Admit:   Through independent evaluation of the patient's history, physical, and imperical data, the patient meets criteria for inpatient admission to the hospital.      Final diagnoses:   ST elevation myocardial infarction (STEMI), unspecified artery        ED Disposition       ED Disposition   Decision to Admit    Condition   --    Comment   Level of Care: Critical Care [6]   Diagnosis: STEMI (ST elevation myocardial infarction) [148947]   Admitting Physician: FELIBERTO MULLINS [[id number]]   Attending Physician: FELIBERTO MULLINS [[id number]]   Certification: I Certify That Inpatient Hospital Services Are Medically Necessary For Greater Than 2 Midnights                  This medical record created using voice recognition software.             Agustín Ortiz DO  03/04/25 0958

## 2025-03-04 NOTE — PAYOR COMM NOTE
"Danyelle Clifton (34 y.o. Male)       Date of Birth   1990    Social Security Number       Address   [address] [place] [id number]    Home Phone   [phone]    MRN   [id number]       Taoist   None    Marital Status   Single                            Admission Date   3/4/25    Admission Type   Emergency    Admitting Provider   Misha Bailey MD    Attending Provider   Misha Bailey MD    Department, Room/Bed   [facility] INTENSIVE CARE UNIT, I10/       Discharge Date       Discharge Disposition       Discharge Destination                                 Attending Provider: Misha Bailey MD    Allergies: Shellfish Allergy, Morphine    Isolation: None   Infection: None   Code Status: CPR    Ht: 194.9 cm (76.75\")   Wt: 168 kg (369 lb 11.4 oz)    Admission Cmt: None   Principal Problem: STEMI (ST elevation myocardial infarction) [I21.3]                   Active Insurance as of 3/4/2025       Primary Coverage       Payor Plan Insurance Group Employer/Plan Group    PASSUNM [facility] HEALTH BY MERYL PASSUNM [facility] BY MERYL [id number]       Payor Plan Address Payor Plan Phone Number Payor Plan Fax Number Effective Dates    [address] [address]   2021 - None Entered    [facility] [id number]         Subscriber Name Subscriber Birth Date Member ID       DANYELLE CLIFTON 1990 [id number]                     Emergency Contacts        (Rel.) Home Phone Work Phone Mobile Phone    Isaiah Clifton (Brother) -- -- [phone]    Dana Griffith (Mother) -- -- [phone]                 History & Physical        Misha Bailey MD at [address] [place] [address]   CARDIOLOGY HISTORY AND PHYSICAL    Patient Name: Danyelle Clifton  : 1990  MRN: [id number]  Primary Care Physician:  Melly Tamayo APRN  Date of admission: 3/4/2025    Subjective  Subjective     Chief Complaint: Chest pain    HPI:    Danyelle Cliftno is a 34 y.o. male with " hypertension which is difficult to control, psychiatric disorder unspecified on medications.  He presents emergency room because of chest pain a few days duration, worse this morning.  Pain was present mostly on the left side of the chest, described as heaviness, associated with a severe squeezing sensation of the left arm.  He was unable to take a deep breath due to pain.  There were no palpitations or dizziness.  EMS was called due to worsening symptoms this morning.  EKG done by the EMS and also in the ED showed ST segment elevations inferior leads with reciprocal changes in the lateral leads, consistent with acute inferior STEMI.  He received IV heparin and aspirin and was emergently transferred to Cath Lab.    On arrival to Cath Lab he was hemodynamically stable blood pressure was on the higher side.  He reported 5/10 chest discomfort, which improved after getting sublingual nitroglycerin.  He received IV hydralazine and IV Lopressor for high blood pressure and tachycardia.  Cardiac catheterization showed 99% stenosis involving distal right coronary artery, resulting in CARMEN II flow to right PDA and PLV branches.  This appears to be the culprit lesion for patient's presentation with STEMI.  He was also noted to have nonobstructive and borderline lesions in left circumflex artery.  He underwent primary angioplasty with drug-eluting stent placement to distal right coronary artery into the PLV branch restoring CARMEN-3 flow.  Patient's symptoms improved at the end of the procedure.    Throughout the procedure, his blood pressure remained high and received multiple dose of IV hydralazine.    Review of Systems   All systems were reviewed and negative except for: Per HPI    Personal History     Past Medical History:   Diagnosis Date    Allergic rhinitis     Anxiety     Asperger's syndrome     Colon polyps 07/18/2014    Depression     GERD (gastroesophageal reflux disease) 01/23/2015    Hematuria, microscopic 03/25/2014     3/25//2014 large blood, > 300 protein u/a in office on repeat from outpt labs.    Hypertension     Microscopic hematuria 3/25/2014    Resistant hypertension 02/03/2020    Tourette's     Tourette's disorder 03/06/2014       Family History: family history includes Diabetes type II in an other family member; Mental illness in his mother; No Known Problems in his father; Stroke in an other family member. Otherwise pertinent FHx was reviewed and not pertinent to current issue.    Social History:  reports that he has been smoking cigarettes. He started smoking about 16 years ago. He has a 0.8 pack-year smoking history. He has been exposed to tobacco smoke. He has never used smokeless tobacco. He reports that he does not currently use drugs. He reports that he does not drink alcohol.    Home Medications:  Blood Pressure Cuff, FLUoxetine, amLODIPine, cloNIDine, diclofenac, flecainide, gabapentin, hydrALAZINE, hydrOXYzine, levothyroxine, losartan, and paliperidone      Allergies:  Allergies   Allergen Reactions    Shellfish Allergy Swelling    Morphine Other (See Comments)     unk       Objective  Objective     Vitals:   Temp:  [98.5 °F (36.9 °C)] 98.5 °F (36.9 °C)  Heart Rate:  [113] 113  Resp:  [18] 18  BP: (183)/(99) 183/99  Physical Exam    Constitutional: Awake, alert, in mild distress because of pain, anxious, talkative   Eyes: PERRLA, sclerae anicteric, no conjunctival injection   HENT: NCAT, mucous membranes moist   Neck: Supple, no thyromegaly, no lymphadenopathy, trachea midline   Respiratory: Clear to auscultation bilaterally, nonlabored respirations    Cardiovascular: RRR, no murmurs, rubs, or gallops, palpable pedal pulses bilaterally   Gastrointestinal: Positive bowel sounds, soft, nontender, nondistended   Musculoskeletal: No bilateral ankle edema, no clubbing or cyanosis to extremities   Psychiatric: Appropriate affect, cooperative   Neurologic: Oriented x 3, pressured speech   Skin: No rashes     Result  Review   Result Review:  I have personally reviewed the results from the time of this admission to 3/4/2025 11:28 EST and agree with these findings:  [x]  Laboratory  []  Microbiology  [x]  Radiology  [x]  EKG/Telemetry   [x]  Cardiology/Vascular   []  Pathology  [x]  Old records  []  Other:  Most notable findings include:          Latest Reference Range & Units 03/04/25 09:35   Creatine Kinase 20 - 200 U/L 187   CKMB <=10.40 ng/mL 14.90 (H)   HS Troponin T <22 ng/L 249 (C)   proBNP 0.0 - 450.0 pg/mL 286.3     CMP          3/4/2025    09:35   CMP   Glucose 114    BUN 10    Creatinine 1.18    EGFR 83.0    Sodium 135    Potassium 3.6    Chloride 102    Calcium 8.8    Total Protein 7.6    Albumin 3.9    Globulin 3.7    Total Bilirubin 0.5    Alkaline Phosphatase 112    AST (SGOT) 61    ALT (SGPT) 24    Albumin/Globulin Ratio 1.1    BUN/Creatinine Ratio 8.5    Anion Gap 11.0       Assessment & Plan  Assessment / Plan     Brief Patient Summary:  Anthony Tay is a 34 y.o. male with hypertension which is difficult to control, psychiatric disorder unspecified on medications, presented with chest pain and noted to have inferior STEMI    Active Hospital Problems:  Active Hospital Problems    Diagnosis     **STEMI (ST elevation myocardial infarction)      Inferior STEMI ; culprit lesion is 99% hazy lesion of the distal right coronary artery.  He underwent primary angioplasty with stent placement restoring CARMEN-3 flow.  LCx artery was noted to have nonobstructive and borderline lesions.  LV ejection fraction is close to normal at 50%.  He was loaded with aspirin and Brilinta in the Cath Lab.    Resistant hypertension ; blood pressure not well-controlled  Psychiatric disorder, unspecified    Plan:     Continue dual antiplatelet therapy with aspirin and Brilinta  Will initiate high intensity statins  Starting beta-blockers, dose to be titrated up as tolerated  Restart all home antihypertensive medications  now  Echocardiogram to assess LV function and valvular function    Further management in the intensive care unit    VTE Prophylaxis:  Pharmacologic VTE prophylaxis orders are present.        CODE STATUS:       Admission Status:  I believe this patient meets inpatient status.    Electronically signed by Misha Bailey MD, 03/04/25, 11:28 AM EST.             Electronically signed by Misha Bailey MD at 03/04/25 1137          Emergency Department Notes        Agustín Ortiz, DO at 03/04/25 0943          Time: 9:43 AM EST  Date of encounter:  3/4/2025  Independent Historian/Clinical History and Information was obtained by:   Patient    History is limited by: N/A    Chief Complaint: Chest pain      History of Present Illness:  Patient is a 34 y.o. year old male who presents to the emergency department for evaluation of chest pain.  Patient reports several days of persistent chest pressure which she describes as an elephant sitting on his chest.  He also has radiation to his left arm and feels like he cannot take a deep breath.  Patient does have history of hypertension and states he is compliant with his medication.      Patient Care Team  Primary Care Provider: Melly Tamayo APRN    Past Medical History:     Allergies   Allergen Reactions    Shellfish Allergy Swelling    Morphine Other (See Comments)     unk     Past Medical History:   Diagnosis Date    Allergic rhinitis     Anxiety     Asperger's syndrome     Colon polyps 07/18/2014    Depression     GERD (gastroesophageal reflux disease) 01/23/2015    Hematuria, microscopic 03/25/2014    3/25//2014 large blood, > 300 protein u/a in office on repeat from outpt labs.    Hypertension     Microscopic hematuria 3/25/2014    Resistant hypertension 02/03/2020    Tourette's     Tourette's disorder 03/06/2014     Past Surgical History:   Procedure Laterality Date    COLONOSCOPY  07/18/2014    CYSTOSCOPY  04/2014    WNL    POLYPECTOMY  07/18/2014     Family History    Problem Relation Age of Onset    Mental illness Mother     No Known Problems Father     Stroke Other     Diabetes type II Other        Home Medications:  Prior to Admission medications    Medication Sig Start Date End Date Taking? Authorizing Provider   amLODIPine (NORVASC) 10 MG tablet Take 1 tablet by mouth Daily. 10/28/24   Hillary Worthington APRN   Blood Pressure Monitoring (Blood Pressure Cuff) misc Use as directed to monitor blood pressure once daily 1/10/24   Hillary Worthington APRN   cloNIDine (CATAPRES) 0.2 MG tablet Take 1 tablet by mouth 2 (Two) Times a Day. 10/28/24   Hillary Worthington APRN   diclofenac (VOLTAREN) 75 MG EC tablet Take 1 tablet by mouth 2 (Two) Times a Day. 10/28/24   Hillary Worthington APRN   flecainide (TAMBOCOR) 50 MG tablet Take 1 tablet by mouth Every 12 (Twelve) Hours. 10/28/24   Hillary Worthington APRN   FLUoxetine (PROzac) 20 MG capsule Take 1 capsule by mouth Daily for 90 days. Take with 40mg for total dose of 60mg. 2/10/25 5/11/25  Melly Giang PA-C   FLUoxetine (PROzac) 40 MG capsule Take 1 capsule by mouth Every Morning for 90 days. Take with 20mg cap for total dose of 60mg 2/10/25 5/11/25  Melly Giang PA-C   gabapentin (Neurontin) 800 MG tablet Take 1 tablet by mouth Every Night for 90 days. 2/10/25 5/11/25  Melly Giang PA-C   hydrALAZINE (APRESOLINE) 50 MG tablet TAKE 2 TABLETS BY MOUTH EVERY MORNING AND 1 TABLET EVERY EVENING 10/28/24   Hillary Worthington APRN   hydrOXYzine (ATARAX) 25 MG tablet TAKE 1 TO 2 TABLETS BY MOUTH ONCE DAILY AS NEEDED SEVERE  ANXIETY 2/10/25   Melly Giang PA-C   levothyroxine (SYNTHROID, LEVOTHROID) 50 MCG tablet Take 1 tablet by mouth Daily. 10/28/24   Hillary Worthington APRN   losartan (COZAAR) 100 MG tablet Take 1 tablet by mouth Daily. 10/28/24   Ander, Hillary A, APRN   paliperidone (INVEGA) 9 MG 24 hr tablet Take 1 tablet by mouth Every Morning. 2/10/25   Melly Giang PA-C        Social History:   Social History  "    Tobacco Use    Smoking status: Some Days     Current packs/day: 0.05     Average packs/day: 0.1 packs/day for 16.2 years (0.8 ttl pk-yrs)     Types: Cigarettes     Start date: 2009     Passive exposure: Current    Smokeless tobacco: Never    Tobacco comments:     Stopped smoking at age 29; social smoker   Vaping Use    Vaping status: Never Used   Substance Use Topics    Alcohol use: Never    Drug use: Not Currently         Review of Systems:  Review of Systems   Respiratory:  Positive for chest tightness and shortness of breath.         Physical Exam:  BP (!) 183/99 (BP Location: Left arm, Patient Position: Lying)   Pulse 113   Temp 98.5 °F (36.9 °C) (Oral)   Resp 18   Ht 194.9 cm (76.75\")   Wt (!) 168 kg (369 lb 11.4 oz)   SpO2 98%   BMI 44.13 kg/m²     Physical Exam  Vitals and nursing note reviewed.   Constitutional:       General: He is not in acute distress.  HENT:      Head: Normocephalic.   Cardiovascular:      Rate and Rhythm: Normal rate and regular rhythm.      Heart sounds: Normal heart sounds.   Pulmonary:      Effort: Pulmonary effort is normal. No respiratory distress.      Breath sounds: Normal breath sounds.   Abdominal:      General: Abdomen is flat.      Palpations: Abdomen is soft.      Tenderness: There is no abdominal tenderness.   Musculoskeletal:         General: Normal range of motion.      Cervical back: Normal range of motion.   Skin:     General: Skin is warm and dry.   Neurological:      Mental Status: He is alert and oriented to person, place, and time. Mental status is at baseline.                    Medical Decision Making:      Comorbidities that affect care:    Hypertension    External Notes reviewed:    Previous Clinic Note: Patient seen 6/25/2024 by PCP for hypertension      The following orders were placed and all results were independently analyzed by me:  Orders Placed This Encounter   Procedures    XR Chest 1 View    [place] Draw    High Sensitivity Troponin T    " Comprehensive Metabolic Panel    Lipase    BNP    Magnesium    CBC Auto Differential    NPO Diet NPO Type: Strict NPO    Undress & Gown    Continuous Pulse Oximetry    Oxygen Therapy- Nasal Cannula; Titrate 1-6 LPM Per SpO2; 90 - 95%    ECG 12 Lead ED Triage Standing Order; Chest Pain    ECG 12 Lead ED Triage Standing Order; Chest Pain    Insert Peripheral IV    Inpatient Admission    CBC & Differential    Green Top (Gel)    Lavender Top    Gold Top - SST    Light Blue Top    Send To Cath Lab       Medications Given in the Emergency Department:  Medications   sodium chloride 0.9 % flush 10 mL (has no administration in time range)   aspirin chewable tablet 324 mg (324 mg Oral Not Given 3/4/25 0941)   diphenhydrAMINE (BENADRYL) injection (50 mg Intravenous Given 3/4/25 0950)   heparin (porcine) 5000 UNIT/ML injection 5,000 Units (has no administration in time range)   methylPREDNISolone sodium succinate (SOLU-Medrol) injection (125 mg Intravenous Given 3/4/25 0950)   Midazolam HCl (PF) (VERSED) injection (1 mg Intravenous Given 3/4/25 0957)   fentaNYL citrate (PF) (SUBLIMAZE) injection (25 mcg Intravenous Given 3/4/25 0954)        ED Course:    ED Course as of 03/04/25 0957   Tue Mar 04, 2025   0932 EKG:    Rhythm: Sinus tachycardia  Rate: 108  Intervals: Normal  T-wave: Normal  ST Segment: ST elevations in 2,3, aVF with reciprocal ST depression changes in aVL    EKG Comparison: Not available    Interpreted by me   [NL]      ED Course User Index  [NL] Agustín Ortiz DO       Labs:    Lab Results (last 24 hours)       Procedure Component Value Units Date/Time    High Sensitivity Troponin T [[id number]] Collected: 03/04/25 0935    Specimen: Blood Updated: 03/04/25 0942    CBC & Differential [[id number]]  (Abnormal) Collected: 03/04/25 0935    Specimen: Blood Updated: 03/04/25 0946    Narrative:      The following orders were created for panel order CBC & Differential.  Procedure                               Abnormality          Status                     ---------                               -----------         ------                     CBC Auto Differential[[id number]]        Abnormal            Final result                 Please view results for these tests on the individual orders.    Comprehensive Metabolic Panel [[id number]] Collected: 03/04/25 0935    Specimen: Blood Updated: 03/04/25 0942    Lipase [[id number]] Collected: 03/04/25 0935    Specimen: Blood Updated: 03/04/25 0942    BNP [[id number]] Collected: 03/04/25 0935    Specimen: Blood Updated: 03/04/25 0942    Magnesium [[id number]] Collected: 03/04/25 0935    Specimen: Blood Updated: 03/04/25 0942    CBC Auto Differential [[id number]]  (Abnormal) Collected: 03/04/25 0935    Specimen: Blood Updated: 03/04/25 0946     WBC 9.24 10*3/mm3      RBC 5.98 10*6/mm3      Hemoglobin 16.1 g/dL      Hematocrit 48.4 %      MCV 80.9 fL      MCH 26.9 pg      MCHC 33.3 g/dL      RDW 14.4 %      RDW-SD 42.2 fl      MPV 10.0 fL      Platelets 298 10*3/mm3      Neutrophil % 69.8 %      Lymphocyte % 22.0 %      Monocyte % 7.3 %      Eosinophil % 0.4 %      Basophil % 0.1 %      Immature Grans % 0.4 %      Neutrophils, Absolute 6.45 10*3/mm3      Lymphocytes, Absolute 2.03 10*3/mm3      Monocytes, Absolute 0.67 10*3/mm3      Eosinophils, Absolute 0.04 10*3/mm3      Basophils, Absolute 0.01 10*3/mm3      Immature Grans, Absolute 0.04 10*3/mm3      nRBC 0.0 /100 WBC              Imaging:    No Radiology Exams Resulted Within Past 24 Hours      Differential Diagnosis and Discussion:    Chest Pain:  Based on the patient's signs and symptoms, I considered aortic dissection, myocardial infaction, pulmonary embolism, cardiac tamponade, pericarditis, pneumothorax, musculoskeletal chest pain and other differential diagnosis as an etiology of the patient's chest pain.     PROCEDURES:    An EKG was performed and the EKG was interpreted by me.    ECG 12 Lead ED Triage Standing Order; Chest Pain    Preliminary Result   HEART KFQV=442  bpm   RR Ivgxgyrx=400  ms   CA Odatbcap=234  ms   P Horizontal Axis=24  deg   P Front Axis=61  deg   QRSD Interval=94  ms   QT Zmzbtwag=890  ms   MQfT=289  ms   QRS Axis=22  deg   T Wave Axis=83  deg   - ABNORMAL ECG -   Sinus tachycardia   Borderline prolonged CA interval   Inferior infarct, acute (RCA)   Anteroseptal infarct, old   Date and Time of Study:2025-03-04 09:32:42          Procedures    MDM  Patient has evidence of an inferior wall   STEMI on EKG.  Patient was discussed with Dr. Bailey, interventional cardiology, who will take the patient to the Cath Lab.  He request the patient get a heparin 5000 unit bolus.  Patient received aspirin 324 mg, sublingual nitroglycerin and fentanyl enroute.  Patient's chest pain improved from an 8 out of 10 to 4 out of 10.        Total Critical Care time of 35 minutes. Total critical care time documented does not include time spent on separately billed procedures for services of nurses or physician assistants. I personally saw and examined the patient. I have reviewed all diagnostic interpretations and treatment plans as written. I was present for the key portions of any procedures performed and the inclusive time noted in any critical care statement. Critical care time includes patient management by me, time spent at the patients bedside,  time to review lab and imaging results, discussing patient care, documentation in the medical record, and time spent with family or caregiver.          Patient Care Considerations:        Consultants/Shared Management Plan:    Patient discussed with Dr. Bailey, interventional cardiology.    Social Determinants of Health:    Patient is independent, reliable, and has access to care.       Disposition and Care Coordination:  Patient going to the Cath Lab from the ER.  Admit:   Through independent evaluation of the patient's history, physical, and imperical data, the patient meets criteria for  inpatient admission to the hospital.      Final diagnoses:   ST elevation myocardial infarction (STEMI), unspecified artery        ED Disposition       ED Disposition   Decision to Admit    Condition   --    Comment   Level of Care: Critical Care [6]   Diagnosis: STEMI (ST elevation myocardial infarction) [198282]   Admitting Physician: FELIBERTO MULLINS [[id number]]   Attending Physician: FELIBERTO MULLINS [[id number]]   Certification: I Certify That Inpatient Hospital Services Are Medically Necessary For Greater Than 2 Midnights                 This medical record created using voice recognition software.             Agustín Ortiz DO  03/04/25 0957      Electronically signed by Agustín Ortiz DO at 03/04/25 0957       China Martines, RN at 03/04/25 0931          Patient to ED from home via [facility] for CP that's been going on for the last 3 days. EMS administered 324 asa, 0.4 Nitro, 50mcg fent en route.     Electronically signed by China Martines, RN at 03/04/25 0932       Facility-Administered Medications as of 3/4/2025   Medication Dose Route Frequency Provider Last Rate Last Admin    acetaminophen (TYLENOL) tablet 650 mg  650 mg Oral Q4H PRN Feliberto Mullins MD        [START ON 3/5/2025] amLODIPine (NORVASC) tablet 10 mg  10 mg Oral Daily Feliberto Mullins MD        [COMPLETED] amLODIPine (NORVASC) tablet 10 mg  10 mg Oral Once Feliberto Mullins MD   10 mg at 03/04/25 1316    [START ON 3/5/2025] aspirin EC tablet 81 mg  81 mg Oral Daily Feliberto Mullins MD        atorvastatin (LIPITOR) tablet 40 mg  40 mg Oral Nightly Feliberto Mullins MD        carvedilol (COREG) tablet 3.125 mg  3.125 mg Oral BID With Meals Feliberto Mullins MD        cloNIDine (CATAPRES) tablet 0.2 mg  0.2 mg Oral BID Feliberto Mullins MD        [COMPLETED] cloNIDine (CATAPRES) tablet 0.2 mg  0.2 mg Oral Once Feliberto Mullins MD   0.2 mg at 03/04/25 1317    FLUoxetine (PROzac) capsule 60 mg  60 mg Oral Nightly Feliberto Mullins MD         gabapentin (NEURONTIN) capsule 400 mg  400 mg Oral Nightly Misha Bailey MD        heparin (porcine) 5000 UNIT/ML injection 5,000 Units  5,000 Units Intravenous Once Agustín Ortiz DO        hydrALAZINE (APRESOLINE) tablet 25 mg  25 mg Oral Q12H Misha Bailey MD   25 mg at 03/04/25 1315    hydrOXYzine (ATARAX) tablet 10 mg  10 mg Oral TID PRN Misha Bailey MD        [START ON 3/5/2025] levothyroxine (SYNTHROID, LEVOTHROID) tablet 50 mcg  50 mcg Oral Q AM Misha Bailey MD        losartan (COZAAR) tablet 100 mg  100 mg Oral Daily Misha Bailey MD   100 mg at 03/04/25 1317    naloxone (NARCAN) injection 0.4 mg  0.4 mg Intravenous Q5 Min PRN Misha Bailey MD        nitroglycerin (NITROSTAT) SL tablet 0.4 mg  0.4 mg Sublingual Q5 Min PRN Misha Bailey MD        ondansetron ODT (ZOFRAN-ODT) disintegrating tablet 4 mg  4 mg Oral Q6H PRN Misha Bailey MD        Or    ondansetron (ZOFRAN) injection 4 mg  4 mg Intravenous Q6H PRN Misha Bailey MD        sodium chloride 0.9 % flush 10 mL  10 mL Intravenous PRN Agustín Ortiz DO        ticagrelor (BRILINTA) tablet 90 mg  90 mg Oral Q12H Misha Bailey MD         Orders (active)        Start     Ordered    03/05/25 0900  amLODIPine (NORVASC) tablet 10 mg  Daily         03/04/25 1227    03/05/25 0900  aspirin EC tablet 81 mg  Daily         03/04/25 1134    03/05/25 0700  ECG 12 Lead Other; STEMI s/p PCI  Once         03/04/25 1134    03/05/25 0600  levothyroxine (SYNTHROID, LEVOTHROID) tablet 50 mcg  Every Early Morning         03/04/25 1227    03/05/25 0600  CBC (No Diff)  Morning Draw         03/04/25 1134    03/05/25 0600  Basic Metabolic Panel  Morning Draw         03/04/25 1134    03/05/25 0600  CBC & Differential  Morning Draw         03/04/25 1227    03/05/25 0600  CK  Morning Draw         03/04/25 1227    03/05/25 0600  CK-MB  Morning Draw         03/04/25 1227    03/05/25 0600  Comprehensive Metabolic Panel  Morning Draw          03/04/25 1227    03/05/25 0600  Hemoglobin A1c  Morning Draw         03/04/25 1227    03/05/25 0600  Lipid Panel  Morning Draw         03/04/25 1227    03/05/25 0600  Magnesium  Morning Draw         03/04/25 1227    03/05/25 0600  High Sensitivity Troponin T  Morning Draw         03/04/25 1227    03/05/25 0600  TSH  Morning Draw         03/04/25 1227    03/04/25 2100  cloNIDine (CATAPRES) tablet 0.2 mg  2 Times Daily         03/04/25 1227    03/04/25 2100  FLUoxetine (PROzac) capsule 60 mg  Nightly         03/04/25 1227    03/04/25 2100  gabapentin (NEURONTIN) capsule 400 mg  Nightly         03/04/25 1227    03/04/25 2100  atorvastatin (LIPITOR) tablet 40 mg  Nightly         03/04/25 1134    03/04/25 2100  ticagrelor (BRILINTA) tablet 90 mg  Every 12 Hours Scheduled         03/04/25 1134    03/04/25 1600  Strict intake and output  Every 4 Hours       03/04/25 1227    03/04/25 1415  carvedilol (COREG) tablet 3.125 mg  2 Times Daily With Meals         03/04/25 1328    03/04/25 1315  hydrALAZINE (APRESOLINE) tablet 25 mg  Every 12 Hours Scheduled         03/04/25 1227    03/04/25 1315  losartan (COZAAR) tablet 100 mg  Daily         03/04/25 1227    03/04/25 1228  Vital Signs and Check distal extremity for warmth, color, sensation and pulses with each vital sign and site check.  Per Order Details        Comments: Check distal extremity for warmth, color, sensation and pulses with each vital sign and site check.    03/04/25 1227    03/04/25 1228  Encourage fluids  Until Discontinued         03/04/25 1227    03/04/25 1228  Activity - Strict Bed Rest  Until Discontinued         03/04/25 1227    03/04/25 1228  Discontinue Radial TR Band Per Removal Protocol  Per Order Details        Comments: If Bleeding Occurs Re-Inflate 2 mL & Then Continue With 2 mL Every 15 Minute Deflations. Gently Roll Band Off Insertion Site After Completely Deflated.    03/04/25 1227    03/04/25 1228  Keep Affected Arm Straight & Elevated   Continuous         03/04/25 1227    03/04/25 1228  Notify MD if platelet count is less than 100,000, is less than 1/2 baseline, or if Hgb drops by more than 3mg/dl.  Until Discontinued         03/04/25 1227    03/04/25 1228  Notify MD of hypotension (SBP less than 95), bleeding, or dysrythmia and follow Sheath Removal Policy if needed.  Continuous         03/04/25 1227    03/04/25 1228  Cardiac Rehab Evaluation and Enrollment  Once        Provider:  (Not yet assigned)    03/04/25 1227    03/04/25 1228  Hold metFORMIN (GLUCOPHAGE) for 48 Hours  Continuous         03/04/25 1227    03/04/25 1227  hydrOXYzine (ATARAX) tablet 10 mg  3 Times Daily PRN         03/04/25 1227    03/04/25 1135  Code Status and Medical Interventions: CPR (Attempt to Resuscitate); Full Support  Continuous         03/04/25 1134    03/04/25 1135  Continuous Cardiac Monitoring  Continuous        Comments: Follow Standing Orders As Outlined in Process Instructions (Open Order Report to View Full Instructions)    03/04/25 1134    03/04/25 1135  Maintain IV Access  Continuous         03/04/25 1134    03/04/25 1135  Telemetry - Place Orders & Notify Provider of Results When Patient Experiences Acute Chest Pain, Dysrhythmia or Respiratory Distress  Continuous        Comments: Open Order Report to View Parameters Requiring Provider Notification    03/04/25 1134    03/04/25 1135  Continuous Pulse Oximetry  Continuous         03/04/25 1134    03/04/25 1135  Advance Diet As Tolerated -  Until Discontinued         03/04/25 1134    03/04/25 1135  Diet: Cardiac; Healthy Heart (2-3 Na+); Fluid Consistency: Thin (IDDSI 0)  Diet Effective Now         03/04/25 1134    03/04/25 1134  nitroglycerin (NITROSTAT) SL tablet 0.4 mg  Every 5 Minutes PRN         03/04/25 1134    03/04/25 1134  acetaminophen (TYLENOL) tablet 650 mg  Every 4 Hours PRN         03/04/25 1134    03/04/25 1134  naloxone (NARCAN) injection 0.4 mg  Every 5 Minutes PRN         03/04/25 1134     "03/04/25 1134  ondansetron ODT (ZOFRAN-ODT) disintegrating tablet 4 mg  Every 6 Hours PRN        Placed in \"Or\" Linked Group    03/04/25 1134    03/04/25 1134  ondansetron (ZOFRAN) injection 4 mg  Every 6 Hours PRN        Placed in \"Or\" Linked Group    03/04/25 1134    03/04/25 1131  POC Activated Clotting Time  PROCEDURE ONCE         03/04/25 1035    03/04/25 1015  heparin (porcine) 5000 UNIT/ML injection 5,000 Units  Once         03/04/25 0950    03/04/25 0935  Undress & Gown  Once         03/04/25 0934    03/04/25 0935  Insert Peripheral IV  Once         03/04/25 0934    03/04/25 0934  sodium chloride 0.9 % flush 10 mL  As Needed         03/04/25 0934    Unscheduled  Oxygen Therapy- Nasal Cannula; Titrate 1-6 LPM Per SpO2; 90 - 95%  Continuous PRN       03/04/25 0934    Unscheduled  ECG 12 Lead ED Triage Standing Order; Chest Pain  As Needed      Comments: Persistent, Unrelieved or Recurrent Chest Pain    03/04/25 0934    Unscheduled  Change site dressing  As Needed       03/04/25 1227    Unscheduled  Oxygen Therapy- Nasal Cannula; 2 LPM  Continuous PRN       03/04/25 1134                       Consult Notes (last 24 hours)        Cary Farias at 03/04/25 0951             03/04/25 0948   Coping/Psychosocial   Observed Emotional State calm;shocked   Verbalized Emotional State disbelief;acceptance   Trust Relationship/Rapport thoughts/feelings acknowledged;emotional support provided   Family/Support Persons other (see comments)  (brother has been notified)   Additional Documentation Spiritual Care (Group)   Spiritual Care   Spiritual Care Source nurse referral   Spiritual Care Follow-Up other (see comments)  (pt is going to cath lab.  will continue to support his family when they arrive.)   Response to Spiritual Care receptive of support;laughter  (Pt shares that we he is nervous he likes to make a lot of jokes.)   Spiritual Care Visit Type other (see comments)  (stemi alert to trauma 1)   Spiritual Care " Request procedure preparation support       Electronically signed by Cary Farias at [phone]         [facility]  CARDIAC CATHETERIZATION PROCEDURE REPORT     Patient: Anthony Tya  : 1990  MRN: [id number]  Procedure Date: 25     Referring Physician:   Agustín Mata MD     Interventional Cardiologist:   Misha Bailey MD     Indication:  Acute inferior ST segment elevation myocardial infarction     Clinical Presentation:  Mr. Tay presented to the emergency room because of chest pain of 2 days duration, worse since this morning.  The pain was radiating to the left arm with severe numbness of the arm.  EKGs done by EMS and also in the ER revealed acute inferior ST segment elevation myocardial infarction.  Patient was loaded with aspirin and also received IV heparin.  He was emergently transferred to Cath Lab for Cardiac catheterization primary angioplasty if indicated.     Procedure performed:  Diagnostic Left Heart Catheterization  Coronary Angiography  Left Ventriculography  Successful percutaneous coronary intervention to distal right coronary artery using 3.0 x 18 Xience Skypoint drug-eluting stent, postdilated using 3.75 noncompliant balloon with good angiographic results.     Access Site(s):  Right radial artery     Findings:  1. Coronary Artery Anatomy:  Dominance: Right  Left Main: Normal with no stenosis  Left Anterior Descending artery: Medium caliber vessel giving rise to various diagonal and septal  branches.  Entire LAD artery and branches are free of any stenosis, other than luminal irregularities.  Left Circumflex Artery: Nondominant vessel.  OM1 branch is a high takeoff and can also be classified as a ramus intermedius branch.  It is free of any stenosis.  There is a focal 70% lesion involving mid left circumflex artery.  Mild diffuse disease noted in mid LCx artery.  There is another focal lesion of 60-70 % severity noted at the origin of the  terminal OM branch.  Right Coronary Artery: Large dominant vessel.  Mild disease ( < 20 % ) noted in proximal right coronary artery.  Distal right coronary artery as a hazy and tight lesion which is angiographically 99% in severity.  The lesion extends into the larger PLV branch.  Ostium of right PDA is a 40% lesion.  CARMEN II flow noted in PDA and PLV branches.  This appears to be the culprit lesion for patient's presentation with inferior STEMI.     2. Hemodynamics:              The opening aortic pressure is 174/127 with a mean of 152 mmHg.   The left ventricular end-diastolic pressure is 30 mmHg.  There was no gradient across aortic valve on pullback  The closing aortic pressure is 191/118 with a mean of 152 mmHg.                   3. Left Ventriculogram:  Ejection Fraction: 50%  Wall Motion: There is mild inferior wall hypokinesis  Mitral Regurgitation: There is no significant mitral regurgitation     4. Percutaneous Intervention:  Location: Distal right coronary artery  Treatment: YASMANY placement  Pre-stenosis: 99%  Post-stenosis: 0%  Lesion Type C: Yes  CARMEN Flow Pre: 2  CARMEN Flow Post: 3  Bifurcation: Yes  Severe Calcium: No  Dissection: No     Conclusions:  99% hazy lesion involving distal right coronary artery, which is the culprit lesion for patient's presentation with acute coronary syndrome/inferior STEMI.  Borderline lesions noted in mid and distal left circumflex artery.  Overall ejection fraction 50% with mild inferior wall hypokinesis.  Elevated left ventricular end-diastolic pressure.   Successful percutaneous coronary intervention to distal right coronary artery with placement of 3.0 x 18 Xience Skypoint drug-eluting stent, postdilated using 3.75 noncompliant balloon with good angiographic results.     Recommendations:   Continue dual antiplatelet therapy with aspirin and Brilinta  High intensity statins and other measures of secondary prevention  Further management in intensive care unit       Procedure Status:  Emergent     Details of the procedure:  Informed consent was obtained with an explanation of the risks, benefits and alternatives of the procedure. The patient was brought to the Cardiac Catheterization Laboratory and was prepped and draped in a standard sterile fashion. Moderate sedation with Fentanyl and Versed was administered by the circulating nurse. Lidocaine 2% was used to anesthetize the right radial artery and a 5/6 Slender sheath was placed.       A 6 F Ikari 3.75 guide catheter was used to engage the left main coronary artery.  A 6 [place] JR4 guide catheter was used to successfully engage the ostium of right coronary artery.  Diagnostic angiography obtained in all appropriate projections.  We found critical stenosis involving distal right coronary artery, hence proceeded with angioplasty.  Please see below for details of angioplasty.  We then exchanged for an angled pigtail catheter and enter the left ventricle to measure hemodynamics and perform a left ventriculogram.  The catheter was then removed over a guidewire.      Details of angioplasty:      6 [place] JR4 guide catheter was used to engage the ostium of right coronary artery.  A BMW interventional wire was advanced without difficulty across the 99 % stenosis involving distal right coronary artery into PLV branch.  The lesion was predilated using 2.25 x 12 noncompliant balloon to maximum pressure of 12 minerva.  This restored CARMEN-3 flow in PDA and PLV branches.  Next the lesion was stented from distal RCA into the PLV branch using 3.0 x 18 Xience Skypoint drug-eluting stent.  Stent was deployed in 14 minerva.  Next angiogram showed good flow in PDA branch with no posterior impingement, hence this vessel was not wired.  The stent was initially postdilated using 3.5 x 12 noncompliant balloon with multiple inflations.  Maximum pressure pressure was 16 minerva.  Next angiogram showed some underexpansion of the stent in the midportion.  The  stent was again postdilated using 3.75 x 15 noncompliant balloon to maximum pressure of 16 minerva.  The post dilating balloon was removed.  Intracoronary glycerin was given.  Final angiogram was performed in multiple projections before and after removing the interventional wire.  There was no dissection, perforation or distal embolization.  Good stent apposition was noted.  There was a 40% lesion at the ostium of right PDA branch due to stent struts.  It was left alone, CARMEN-3 flow was noted in PDA branch.  The guide catheter was taken out of the body over a wire.     Heparin was used for anticoagulation throughout the procedure with frequent checking of ACT.  Patient was already loaded with aspirin by EMS.  He was loaded with 180 mg of Brilinta at the beginning of angioplasty..  At the end of the procedure, the radial artery sheath was removed and TR band was applied for hemostasis.  Patient tolerated procedure well without any complications.  The results of the test were explained in detail to the patient.     Cumulative fluoroscopy time: 15.3 min      Cumulative air kerma: 2993 mGy     Total amount of contrast used: 190 ml of Isovue         Complications:  None.     Estimated Blood Loss:  15 mL     Misha Bailey MD     03/04/25  13:32 EST

## 2025-03-05 ENCOUNTER — APPOINTMENT (OUTPATIENT)
Dept: CARDIOLOGY | Facility: HOSPITAL | Age: 35
DRG: 322 | End: 2025-03-05
Payer: COMMERCIAL

## 2025-03-05 LAB
ALBUMIN SERPL-MCNC: 3.9 G/DL (ref 3.5–5.2)
ALBUMIN/GLOB SERPL: 1 G/DL
ALP SERPL-CCNC: 98 U/L (ref 39–117)
ALT SERPL W P-5'-P-CCNC: 33 U/L (ref 1–41)
ANION GAP SERPL CALCULATED.3IONS-SCNC: 11.8 MMOL/L (ref 5–15)
AST SERPL-CCNC: 100 U/L (ref 1–40)
BASOPHILS # BLD AUTO: 0.02 10*3/MM3 (ref 0–0.2)
BASOPHILS NFR BLD AUTO: 0.1 % (ref 0–1.5)
BILIRUB SERPL-MCNC: 0.5 MG/DL (ref 0–1.2)
BUN SERPL-MCNC: 17 MG/DL (ref 6–20)
BUN/CREAT SERPL: 12.9 (ref 7–25)
CALCIUM SPEC-SCNC: 9.4 MG/DL (ref 8.6–10.5)
CHLORIDE SERPL-SCNC: 103 MMOL/L (ref 98–107)
CHOLEST SERPL-MCNC: 152 MG/DL (ref 0–200)
CK MB SERPL-CCNC: 31.1 NG/ML
CK SERPL-CCNC: 341 U/L (ref 20–200)
CO2 SERPL-SCNC: 20.2 MMOL/L (ref 22–29)
CREAT SERPL-MCNC: 1.32 MG/DL (ref 0.76–1.27)
DEPRECATED RDW RBC AUTO: 43.5 FL (ref 37–54)
EGFRCR SERPLBLD CKD-EPI 2021: 72.6 ML/MIN/1.73
EOSINOPHIL # BLD AUTO: 0.01 10*3/MM3 (ref 0–0.4)
EOSINOPHIL NFR BLD AUTO: 0 % (ref 0.3–6.2)
ERYTHROCYTE [DISTWIDTH] IN BLOOD BY AUTOMATED COUNT: 15.1 % (ref 12.3–15.4)
GEN 5 1HR TROPONIN T REFLEX: 1208 NG/L
GLOBULIN UR ELPH-MCNC: 3.8 GM/DL
GLUCOSE SERPL-MCNC: 126 MG/DL (ref 65–99)
HBA1C MFR BLD: 5.4 % (ref 4.8–5.6)
HCT VFR BLD AUTO: 47.8 % (ref 37.5–51)
HDLC SERPL-MCNC: 34 MG/DL (ref 40–60)
HGB BLD-MCNC: 15.5 G/DL (ref 13–17.7)
IMM GRANULOCYTES # BLD AUTO: 0.15 10*3/MM3 (ref 0–0.05)
IMM GRANULOCYTES NFR BLD AUTO: 0.7 % (ref 0–0.5)
LDLC SERPL CALC-MCNC: 100 MG/DL (ref 0–100)
LDLC/HDLC SERPL: 2.92 {RATIO}
LYMPHOCYTES # BLD AUTO: 1.27 10*3/MM3 (ref 0.7–3.1)
LYMPHOCYTES NFR BLD AUTO: 5.8 % (ref 19.6–45.3)
MAGNESIUM SERPL-MCNC: 2.1 MG/DL (ref 1.6–2.6)
MCH RBC QN AUTO: 26 PG (ref 26.6–33)
MCHC RBC AUTO-ENTMCNC: 32.4 G/DL (ref 31.5–35.7)
MCV RBC AUTO: 80.1 FL (ref 79–97)
MONOCYTES # BLD AUTO: 0.72 10*3/MM3 (ref 0.1–0.9)
MONOCYTES NFR BLD AUTO: 3.3 % (ref 5–12)
NEUTROPHILS NFR BLD AUTO: 19.77 10*3/MM3 (ref 1.7–7)
NEUTROPHILS NFR BLD AUTO: 90.1 % (ref 42.7–76)
NRBC BLD AUTO-RTO: 0 /100 WBC (ref 0–0.2)
PHOSPHATE SERPL-MCNC: 1.7 MG/DL (ref 2.5–4.5)
PLATELET # BLD AUTO: 417 10*3/MM3 (ref 140–450)
PMV BLD AUTO: 10.2 FL (ref 6–12)
POTASSIUM SERPL-SCNC: 4.3 MMOL/L (ref 3.5–5.2)
PROT SERPL-MCNC: 7.7 G/DL (ref 6–8.5)
QT INTERVAL: 387 MS
QTC INTERVAL: 472 MS
RBC # BLD AUTO: 5.97 10*6/MM3 (ref 4.14–5.8)
SODIUM SERPL-SCNC: 135 MMOL/L (ref 136–145)
TRIGL SERPL-MCNC: 93 MG/DL (ref 0–150)
TROPONIN T % DELTA: -8
TROPONIN T NUMERIC DELTA: -107 NG/L
TROPONIN T SERPL HS-MCNC: 1315 NG/L
TSH SERPL DL<=0.05 MIU/L-ACNC: 0.97 UIU/ML (ref 0.27–4.2)
VLDLC SERPL-MCNC: 18 MG/DL (ref 5–40)
WBC NRBC COR # BLD AUTO: 21.94 10*3/MM3 (ref 3.4–10.8)

## 2025-03-05 PROCEDURE — 93306 TTE W/DOPPLER COMPLETE: CPT | Performed by: INTERNAL MEDICINE

## 2025-03-05 PROCEDURE — 82553 CREATINE MB FRACTION: CPT | Performed by: INTERNAL MEDICINE

## 2025-03-05 PROCEDURE — 99233 SBSQ HOSP IP/OBS HIGH 50: CPT | Performed by: INTERNAL MEDICINE

## 2025-03-05 PROCEDURE — 25010000003 DEXTROSE 5 % SOLUTION: Performed by: PHYSICIAN ASSISTANT

## 2025-03-05 PROCEDURE — 84100 ASSAY OF PHOSPHORUS: CPT | Performed by: INTERNAL MEDICINE

## 2025-03-05 PROCEDURE — 80053 COMPREHEN METABOLIC PANEL: CPT | Performed by: INTERNAL MEDICINE

## 2025-03-05 PROCEDURE — 93005 ELECTROCARDIOGRAM TRACING: CPT | Performed by: INTERNAL MEDICINE

## 2025-03-05 PROCEDURE — 93010 ELECTROCARDIOGRAM REPORT: CPT | Performed by: SPECIALIST

## 2025-03-05 PROCEDURE — 84484 ASSAY OF TROPONIN QUANT: CPT | Performed by: INTERNAL MEDICINE

## 2025-03-05 PROCEDURE — 93306 TTE W/DOPPLER COMPLETE: CPT

## 2025-03-05 PROCEDURE — 85025 COMPLETE CBC W/AUTO DIFF WBC: CPT | Performed by: INTERNAL MEDICINE

## 2025-03-05 PROCEDURE — 83036 HEMOGLOBIN GLYCOSYLATED A1C: CPT | Performed by: INTERNAL MEDICINE

## 2025-03-05 PROCEDURE — 25010000002 SULFUR HEXAFLUORIDE MICROSPH 60.7-25 MG RECONSTITUTED SUSPENSION: Performed by: INTERNAL MEDICINE

## 2025-03-05 PROCEDURE — 83735 ASSAY OF MAGNESIUM: CPT | Performed by: INTERNAL MEDICINE

## 2025-03-05 PROCEDURE — 25010000002 HYDRALAZINE PER 20 MG: Performed by: INTERNAL MEDICINE

## 2025-03-05 PROCEDURE — 84443 ASSAY THYROID STIM HORMONE: CPT | Performed by: INTERNAL MEDICINE

## 2025-03-05 PROCEDURE — 82550 ASSAY OF CK (CPK): CPT | Performed by: INTERNAL MEDICINE

## 2025-03-05 PROCEDURE — 80061 LIPID PANEL: CPT | Performed by: INTERNAL MEDICINE

## 2025-03-05 RX ORDER — PALIPERIDONE 3 MG/1
9 TABLET, EXTENDED RELEASE ORAL EVERY MORNING
Status: DISCONTINUED | OUTPATIENT
Start: 2025-03-05 | End: 2025-03-06 | Stop reason: HOSPADM

## 2025-03-05 RX ORDER — CARVEDILOL 25 MG/1
25 TABLET ORAL 2 TIMES DAILY WITH MEALS
Status: DISCONTINUED | OUTPATIENT
Start: 2025-03-05 | End: 2025-03-06 | Stop reason: HOSPADM

## 2025-03-05 RX ORDER — CLONIDINE HYDROCHLORIDE 0.1 MG/1
0.1 TABLET ORAL 2 TIMES DAILY
Status: DISCONTINUED | OUTPATIENT
Start: 2025-03-05 | End: 2025-03-06

## 2025-03-05 RX ORDER — CARVEDILOL 12.5 MG/1
12.5 TABLET ORAL 2 TIMES DAILY WITH MEALS
Status: DISCONTINUED | OUTPATIENT
Start: 2025-03-05 | End: 2025-03-05

## 2025-03-05 RX ADMIN — LEVOTHYROXINE SODIUM 50 MCG: 50 TABLET ORAL at 05:37

## 2025-03-05 RX ADMIN — SODIUM PHOSPHATE, MONOBASIC, MONOHYDRATE AND SODIUM PHOSPHATE, DIBASIC, ANHYDROUS 15 MMOL: 142; 276 INJECTION, SOLUTION INTRAVENOUS at 08:20

## 2025-03-05 RX ADMIN — CARVEDILOL 12.5 MG: 12.5 TABLET, FILM COATED ORAL at 08:20

## 2025-03-05 RX ADMIN — HYDRALAZINE HYDROCHLORIDE 50 MG: 50 TABLET ORAL at 21:23

## 2025-03-05 RX ADMIN — SULFUR HEXAFLUORIDE 5 ML: KIT at 16:21

## 2025-03-05 RX ADMIN — SODIUM PHOSPHATE, MONOBASIC, MONOHYDRATE AND SODIUM PHOSPHATE, DIBASIC, ANHYDROUS 15 MMOL: 142; 276 INJECTION, SOLUTION INTRAVENOUS at 05:37

## 2025-03-05 RX ADMIN — CLONIDINE HYDROCHLORIDE 0.1 MG: 0.1 TABLET ORAL at 08:19

## 2025-03-05 RX ADMIN — TICAGRELOR 90 MG: 90 TABLET ORAL at 21:24

## 2025-03-05 RX ADMIN — Medication 10 ML: at 08:20

## 2025-03-05 RX ADMIN — MUPIROCIN 1 APPLICATION: 20 OINTMENT TOPICAL at 17:24

## 2025-03-05 RX ADMIN — ATORVASTATIN CALCIUM 40 MG: 40 TABLET, FILM COATED ORAL at 21:24

## 2025-03-05 RX ADMIN — HYDRALAZINE HYDROCHLORIDE 10 MG: 20 INJECTION INTRAMUSCULAR; INTRAVENOUS at 02:23

## 2025-03-05 RX ADMIN — ASPIRIN 81 MG: 81 TABLET, COATED ORAL at 08:20

## 2025-03-05 RX ADMIN — TICAGRELOR 90 MG: 90 TABLET ORAL at 08:20

## 2025-03-05 RX ADMIN — ACETAMINOPHEN 650 MG: 325 TABLET ORAL at 21:22

## 2025-03-05 RX ADMIN — LOSARTAN POTASSIUM 100 MG: 50 TABLET, FILM COATED ORAL at 08:19

## 2025-03-05 RX ADMIN — PALIPERIDONE 9 MG: 3 TABLET, EXTENDED RELEASE ORAL at 08:20

## 2025-03-05 RX ADMIN — CARVEDILOL 25 MG: 25 TABLET, FILM COATED ORAL at 17:24

## 2025-03-05 RX ADMIN — HYDROXYZINE HYDROCHLORIDE 10 MG: 10 TABLET ORAL at 21:22

## 2025-03-05 RX ADMIN — CLONIDINE HYDROCHLORIDE 0.1 MG: 0.1 TABLET ORAL at 21:23

## 2025-03-05 RX ADMIN — FLUOXETINE HYDROCHLORIDE 60 MG: 20 CAPSULE ORAL at 21:23

## 2025-03-05 RX ADMIN — HYDRALAZINE HYDROCHLORIDE 50 MG: 50 TABLET ORAL at 08:19

## 2025-03-05 RX ADMIN — AMLODIPINE BESYLATE 10 MG: 10 TABLET ORAL at 08:20

## 2025-03-05 RX ADMIN — GABAPENTIN 400 MG: 400 CAPSULE ORAL at 21:23

## 2025-03-05 RX ADMIN — ACETAMINOPHEN 650 MG: 325 TABLET ORAL at 06:24

## 2025-03-05 NOTE — PLAN OF CARE
Goal Outcome Evaluation:  Plan of Care Reviewed With: patient        Progress: no change  Outcome Evaluation: Alert and oriented times 4, VSS, no chest pain, no new complaints, continue plan of care

## 2025-03-05 NOTE — PROGRESS NOTES
[facility]     Cardiology Progress Note    Patient Name: Anthony Tay  : 1990  MRN: [id number]  Primary Care Physician:  Melly Tamayo APRN  Date of admission: 3/4/2025    Subjective   Subjective     Chief Complaint: Follow-up visit, STEMI, status post primary angioplasty    Interval HPI:    Patient denies any further episodes of chest pain but denies shortness of breath or palpitations.  Telemetry shows sinus rhythm and sinus bradycardia.  Blood pressure remains elevated.  He has not started ambulating.      Review of Systems   All systems were reviewed and negative except for: Per HPI    Objective   Objective     Vitals:   Temp:  [97.8 °F (36.6 °C)-98.5 °F (36.9 °C)] 98.5 °F (36.9 °C)  Heart Rate:  [] 105  Resp:  [18-30] 26  BP: (128-187)/() 185/109  Physical Exam      General : Alert, awake, no acute distress  CVS : Regular rate and rhythm, no murmur, rubs or gallops  Lungs: Clear to auscultation bilaterally, no crackles or rhonchi  Abdomen: Soft, nontender, bowel sounds heard in all 4 quadrants  Extremities: Warm, well-perfused, no pedal edema, right wrist without hematoma or skin bruising  Neuro : Alert, awake, oriented x 3, no focal neurological deficits    Scheduled Meds:amLODIPine, 10 mg, Oral, Daily  aspirin, 81 mg, Oral, Daily  atorvastatin, 40 mg, Oral, Nightly  carvedilol, 12.5 mg, Oral, BID With Meals  cloNIDine, 0.1 mg, Oral, BID  FLUoxetine, 60 mg, Oral, Nightly  gabapentin, 400 mg, Oral, Nightly  hydrALAZINE, 50 mg, Oral, Q12H  levothyroxine, 50 mcg, Oral, Q AM  losartan, 100 mg, Oral, Daily  paliperidone, 9 mg, Oral, QAM  sodium phosphate, 15 mmol, Intravenous, Q3H  ticagrelor, 90 mg, Oral, Q12H         Result Review    Result Review:  I have personally reviewed the results from the time of this admission to 3/5/2025 09:24 EST and agree with these findings:  [x]  Laboratory  []  Microbiology  [x]  Radiology  [x]  EKG/Telemetry   [x]  Cardiology/Vascular   []   Pathology  []  Old records  []  Other:  Most notable findings include:     CBC          3/4/2025    09:35 3/5/2025    02:31   CBC   WBC 9.24  21.94    RBC 5.98  5.97    Hemoglobin 16.1  15.5    Hematocrit 48.4  47.8    MCV 80.9  80.1    MCH 26.9  26.0    MCHC 33.3  32.4    RDW 14.4  15.1    Platelets 298  417      CMP          3/4/2025    09:35 3/4/2025    13:26 3/5/2025    02:31   CMP   Glucose 114  138  126    BUN 10  10  17    Creatinine 1.18  1.26  1.32    EGFR 83.0  76.8  72.6    Sodium 135  136  135    Potassium 3.6  4.0  4.3    Chloride 102  103  103    Calcium 8.8  9.5  9.4    Total Protein 7.6   7.7    Albumin 3.9   3.9    Globulin 3.7   3.8    Total Bilirubin 0.5   0.5    Alkaline Phosphatase 112   98    AST (SGOT) 61   100    ALT (SGPT) 24   33    Albumin/Globulin Ratio 1.1   1.0    BUN/Creatinine Ratio 8.5  7.9  12.9    Anion Gap 11.0  12.5  11.8       CARDIAC LABS:     Latest Reference Range & Units 03/04/25 09:35 03/04/25 13:26 03/05/25 02:31 03/05/25 03:59   Creatine Kinase 20 - 200 U/L 187 538 (H) 341 (H)    CKMB <=10.40 ng/mL 14.90 (H) 50.65 (H) 31.10 (H)    HS Troponin T <22 ng/L 249 (C) 876 (C) 1,315 (C) 1,208 (C)   Troponin T % Delta Abnormal if >/= 20%     -8   Troponin T Numeric Delta ng/L    -107   proBNP 0.0 - 450.0 pg/mL 286.3          A.m. EKG showed resolution of ST segment elevation, Q waves noted in inferior leads.  Poor R wave progression noted in anterior leads    Assessment & Plan   Assessment / Plan     Brief Patient Summary:  Anthony Tay is a 34 y.o. male with  hypertension which is difficult to control, psychiatric disorder unspecified on medications, presented with chest pain and noted to have inferior STEMI, underwent primary angioplasty with stent placement to culprit vessel, distal RCA    Active Hospital Problems:  Active Hospital Problems    Diagnosis     **STEMI (ST elevation myocardial infarction)        Inferior STEMI ; culprit lesion is 99% hazy lesion of the  distal right coronary artery.  He underwent primary angioplasty with stent placement restoring CARMEN-3 flow.  LCx artery was noted to have nonobstructive and borderline lesions.  LV ejection fraction is close to normal at 50%.  He was loaded with aspirin and Brilinta in the Cath Lab.  Currently chest pain-free.  Cardiac markers started downtrending.  No arrhythmias on telemetry     Resistant hypertension ; blood pressure remains elevated.  Carvedilol started yesterday  Psychiatric disorder, unspecified  Mixed hyperlipidemia : , above goal    Plan:     Continue dual antiplatelet therapy with aspirin and Brilinta  Continue high intensity statins    Carvedilol started last night, will increase the dose to 12.2 mg twice daily for better blood pressure control  Dose of hydralazine increased to 50 mg twice daily for adequate blood pressure control  Decreasing clonidine to 0.1 mg twice daily  Continue amlodipine, losartan, hydrochlorothiazide    Echocardiac will be done today to reevaluate LV function and valvular function  Encourage ambulation today  Repeat labs a.m.  Transfer out of intensive care unit to telemetry bed    CODE STATUS:   Level Of Support Discussed With: Patient  Code Status (Patient has no pulse and is not breathing): CPR (Attempt to Resuscitate)  Medical Interventions (Patient has pulse or is breathing): Full Support      Electronically signed by Misha Bailey MD, 03/05/25, 9:24 AM EST.

## 2025-03-05 NOTE — PLAN OF CARE
Goal Outcome Evaluation:  Plan of Care Reviewed With: patient        Progress: improving  Outcome Evaluation: AAO X4. TOLERATING DIET, ROOM AIR, & ACTIVITY AD TAMMIE. NO C/P CHEST S/P CARDIAC STENT PLACEMENT 3/4/25. CM: SHERYL

## 2025-03-05 NOTE — PROGRESS NOTES
Pulmonary / Critical Care Progress Note      Patient Name: Anthony Tay  : 1990  MRN: [id number]  Primary Care Physician:  Melly Tamayo APRN  Date of admission: 3/4/2025    Subjective   Subjective   Follow-up for acute inferior wall MI, status post percutaneous coronary artery intervention with stent placement in right coronary artery, rested hypertension, obstructive sleep apnea.    Over past 24 hours: Had PCI with stent placement.  Remained in ICU.    No acute events overnight.     This morning,   Feels better.  Blood pressure still high.  No headache.  No nausea or vomiting.  No chest pain or chest tightness.  He is willing to have sleep study as an outpatient.      Review of Systems  General:  Fatigue, No Fever  HEENT: No dysphagia, No Visual Changes, no rhinorrhea  Respiratory:  No cough,+Dyspnea, no phlegm, No Pleuritic Pain, + wheezing, no hemoptysis  Cardiovascular: no chest pain, denies palpitations,+EVERETT, improved Chest Pressure  Gastrointestinal:  No Abdominal Pain, No Nausea, No Vomiting, No Diarrhea  Genitourinary:  No Dysuria, No Frequency, No Hesitancy  Musculoskeletal: No muscle pain or swelling  Neurologic:  No Confusion, no Dysarthria, No Headaches  Skin:  No Rash, No Open Wounds          Objective   Objective     Vitals:   Temp:  [97.8 °F (36.6 °C)-98.5 °F (36.9 °C)] 98.5 °F (36.9 °C)  Heart Rate:  [] 88  Resp:  [18-30] 26  BP: (128-187)/() 159/93  Physical Exam   Vital Signs Reviewed   Morbidly obese male, in no acute distress, normal conversational  HEENT:  PERRL, EOMI.  OP, nares clear, no sinus tenderness  Neck:  Supple, no JVD, no thyromegaly  Lymph: no axillary, cervical, supraclavicular lymphadenopathy noted bilaterally  Chest:  good aeration, clear to auscultation bilaterally, tympanic to percussion bilaterally, no work of breathing noted  CV: RRR, no MGR, pulses 2+, equal  Abd:  Soft, NT, ND, + BS, no HSM  EXT:  no clubbing, no cyanosis, no edema, no joint  tenderness  Neuro:  A&Ox3, CN grossly intact, no focal deficits  Skin: No rashes or lesions noted      Result Review    Result Review:  I have personally reviewed the results from the time of this admission to 3/5/2025 07:43 EST and agree with these findings:  [x]  Laboratory  [x]  Microbiology  [x]  Radiology  [x]  EKG/Telemetry   [x]  Cardiology/Vascular   []  Pathology  []  Old records  []  Other:  Most notable findings include:         Lab 03/05/25  0231 03/04/25  1326 03/04/25  0935   WBC 21.94*  --  9.24   HEMOGLOBIN 15.5  --  16.1   HEMATOCRIT 47.8  --  48.4   PLATELETS 417  --  298   SODIUM 135* 136 135*   POTASSIUM 4.3 4.0 3.6   CHLORIDE 103 103 102   CO2 20.2* 20.5* 22.0   BUN 17 10 10   CREATININE 1.32* 1.26 1.18   GLUCOSE 126* 138* 114*   CALCIUM 9.4 9.5 8.8   PHOSPHORUS 1.7*  --   --    TOTAL PROTEIN 7.7  --  7.6   ALBUMIN 3.9  --  3.9   GLOBULIN 3.8  --  3.7       XR Chest 1 View    Result Date: 3/4/2025  XR CHEST 1 VW Date of Exam: 3/4/2025 12:38 PM EST Indication: Chest Pain Triage Protocol Comparison: [facility] chest x-ray 7/19/2021 Findings: Lungs are adequately expanded and appear clear. No pneumothorax or large pleural effusion is seen. Cardiomediastinal contours appear stable.     Impression: Impression: No acute cardiopulmonary abnormality is identified. Electronically Signed: Maribeth Campos  3/4/2025 1:15 PM EST  Workstation ID: [id number]            Assessment & Plan   Assessment / Plan     Active Hospital Problems:  Active Hospital Problems    Diagnosis     **STEMI (ST elevation myocardial infarction)          Impression:   Acute inferior wall MI  Status post percutaneous coronary artery intervention, with stent placement in right coronary artery  Resistant hypertension  Likely obstructive sleep apnea  Psychiatric disorder  Morbid obesity with a BMI of 44.13    Plan:   Patient is status post PCI and stenting to right coronary artery.  Postop cardiac care per cardiology service.  Continue with aspirin  81 mg once daily.  Continue with Brilinta 90 mg twice daily.  Lipid profile is mildly low HDL. Continue with Lipitor 40 mg once daily.  Continue with Coreg 3.25 mg twice daily.  Drug screen is negative.  Needs to lose weight.  Lifestyle modification was discussed in detail.  Continue with Cozaar 100 mg orally daily, amlodipine 10 mg once daily, Coreg and clonidine.  Continue Synthroid  I discussed with him regarding need of sleep study.  Patient willing to have sleep study as outpatient.   Nicotine patch if patient willing.   Has normal A1c.        VTE Prophylaxis:  No VTE prophylaxis order currently exists.        CODE STATUS:   Level Of Support Discussed With: Patient  Code Status (Patient has no pulse and is not breathing): CPR (Attempt to Resuscitate)  Medical Interventions (Patient has pulse or is breathing): Full Support      I personally reviewed pertinent labs, imaging and provider notes. Discussed with bedside nurse and will discuss with primary service.       Electronically signed by Cole Ruiz MD, 3/5/2025, 07:43 EST.

## 2025-03-06 ENCOUNTER — READMISSION MANAGEMENT (OUTPATIENT)
Dept: CALL CENTER | Facility: HOSPITAL | Age: 35
End: 2025-03-06
Payer: COMMERCIAL

## 2025-03-06 ENCOUNTER — TELEPHONE (OUTPATIENT)
Dept: FAMILY MEDICINE CLINIC | Facility: CLINIC | Age: 35
End: 2025-03-06
Payer: COMMERCIAL

## 2025-03-06 VITALS
TEMPERATURE: 97.9 F | BODY MASS INDEX: 37.19 KG/M2 | RESPIRATION RATE: 18 BRPM | SYSTOLIC BLOOD PRESSURE: 175 MMHG | HEART RATE: 107 BPM | DIASTOLIC BLOOD PRESSURE: 113 MMHG | HEIGHT: 77 IN | WEIGHT: 315 LBS | OXYGEN SATURATION: 96 %

## 2025-03-06 LAB
ALBUMIN SERPL-MCNC: 3.6 G/DL (ref 3.5–5.2)
ALBUMIN/GLOB SERPL: 1.2 G/DL
ALP SERPL-CCNC: 92 U/L (ref 39–117)
ALT SERPL W P-5'-P-CCNC: 24 U/L (ref 1–41)
ANION GAP SERPL CALCULATED.3IONS-SCNC: 10.9 MMOL/L (ref 5–15)
AST SERPL-CCNC: 47 U/L (ref 1–40)
AV MEAN PRESS GRAD SYS DOP V1V2: 5 MMHG
BASOPHILS # BLD AUTO: 0.02 10*3/MM3 (ref 0–0.2)
BASOPHILS NFR BLD AUTO: 0.2 % (ref 0–1.5)
BH CV ECHO MEAS - AO ROOT DIAM: 3.3 CM
BH CV ECHO MEAS - AO V2 VTI: 22.8 CM
BH CV ECHO MEAS - AVA(I,D): 4 CM2
BH CV ECHO MEAS - EDV(CUBED): 101.2 ML
BH CV ECHO MEAS - EDV(MOD-SP2): 165 ML
BH CV ECHO MEAS - EDV(MOD-SP4): 152 ML
BH CV ECHO MEAS - EF(MOD-SP2): 56.1 %
BH CV ECHO MEAS - EF(MOD-SP4): 60.9 %
BH CV ECHO MEAS - ESV(CUBED): 35.3 ML
BH CV ECHO MEAS - ESV(MOD-SP2): 72.4 ML
BH CV ECHO MEAS - ESV(MOD-SP4): 59.5 ML
BH CV ECHO MEAS - FS: 29.6 %
BH CV ECHO MEAS - IVS/LVPW: 0.99 CM
BH CV ECHO MEAS - IVSD: 1.28 CM
BH CV ECHO MEAS - LA DIMENSION: 3.5 CM
BH CV ECHO MEAS - LAT PEAK E' VEL: 6.5 CM/SEC
BH CV ECHO MEAS - LV MASS(C)D: 230.8 GRAMS
BH CV ECHO MEAS - LV MAX PG: 9.2 MMHG
BH CV ECHO MEAS - LV MEAN PG: 5 MMHG
BH CV ECHO MEAS - LV V1 MAX: 152 CM/SEC
BH CV ECHO MEAS - LV V1 VTI: 28.8 CM
BH CV ECHO MEAS - LVIDD: 4.7 CM
BH CV ECHO MEAS - LVIDS: 3.3 CM
BH CV ECHO MEAS - LVOT AREA: 3.1 CM2
BH CV ECHO MEAS - LVOT DIAM: 2 CM
BH CV ECHO MEAS - LVPWD: 1.29 CM
BH CV ECHO MEAS - MED PEAK E' VEL: 7 CM/SEC
BH CV ECHO MEAS - MV A MAX VEL: 120 CM/SEC
BH CV ECHO MEAS - MV DEC SLOPE: 587 CM/SEC2
BH CV ECHO MEAS - MV DEC TIME: 0.17 SEC
BH CV ECHO MEAS - MV E MAX VEL: 100 CM/SEC
BH CV ECHO MEAS - MV E/A: 0.83
BH CV ECHO MEAS - MV MEAN PG: 2 MMHG
BH CV ECHO MEAS - MV V2 VTI: 26.4 CM
BH CV ECHO MEAS - MVA(VTI): 3.4 CM2
BH CV ECHO MEAS - RVDD: 2.44 CM
BH CV ECHO MEAS - SV(LVOT): 90.5 ML
BH CV ECHO MEAS - SV(MOD-SP2): 92.6 ML
BH CV ECHO MEAS - SV(MOD-SP4): 92.5 ML
BH CV ECHO MEASUREMENTS AVERAGE E/E' RATIO: 14.81
BILIRUB SERPL-MCNC: 0.4 MG/DL (ref 0–1.2)
BUN SERPL-MCNC: 18 MG/DL (ref 6–20)
BUN/CREAT SERPL: 13.8 (ref 7–25)
CALCIUM SPEC-SCNC: 8.7 MG/DL (ref 8.6–10.5)
CHLORIDE SERPL-SCNC: 104 MMOL/L (ref 98–107)
CK SERPL-CCNC: 93 U/L (ref 20–200)
CO2 SERPL-SCNC: 23.1 MMOL/L (ref 22–29)
CREAT SERPL-MCNC: 1.3 MG/DL (ref 0.76–1.27)
DEPRECATED RDW RBC AUTO: 45.2 FL (ref 37–54)
EGFRCR SERPLBLD CKD-EPI 2021: 73.9 ML/MIN/1.73
EOSINOPHIL # BLD AUTO: 0.03 10*3/MM3 (ref 0–0.4)
EOSINOPHIL NFR BLD AUTO: 0.3 % (ref 0.3–6.2)
ERYTHROCYTE [DISTWIDTH] IN BLOOD BY AUTOMATED COUNT: 14.9 % (ref 12.3–15.4)
GLOBULIN UR ELPH-MCNC: 3.1 GM/DL
GLUCOSE SERPL-MCNC: 144 MG/DL (ref 65–99)
HCT VFR BLD AUTO: 45.8 % (ref 37.5–51)
HGB BLD-MCNC: 15.2 G/DL (ref 13–17.7)
IMM GRANULOCYTES # BLD AUTO: 0.05 10*3/MM3 (ref 0–0.05)
IMM GRANULOCYTES NFR BLD AUTO: 0.4 % (ref 0–0.5)
LEFT ATRIUM VOLUME INDEX: 17.5 ML/M2
LV EF BIPLANE MOD: 58.3 %
LYMPHOCYTES # BLD AUTO: 2.3 10*3/MM3 (ref 0.7–3.1)
LYMPHOCYTES NFR BLD AUTO: 20.3 % (ref 19.6–45.3)
MAGNESIUM SERPL-MCNC: 1.9 MG/DL (ref 1.6–2.6)
MCH RBC QN AUTO: 27.4 PG (ref 26.6–33)
MCHC RBC AUTO-ENTMCNC: 33.2 G/DL (ref 31.5–35.7)
MCV RBC AUTO: 82.5 FL (ref 79–97)
MONOCYTES # BLD AUTO: 0.73 10*3/MM3 (ref 0.1–0.9)
MONOCYTES NFR BLD AUTO: 6.4 % (ref 5–12)
NEUTROPHILS NFR BLD AUTO: 72.4 % (ref 42.7–76)
NEUTROPHILS NFR BLD AUTO: 8.22 10*3/MM3 (ref 1.7–7)
NRBC BLD AUTO-RTO: 0 /100 WBC (ref 0–0.2)
PHOSPHATE SERPL-MCNC: 3.6 MG/DL (ref 2.5–4.5)
PLATELET # BLD AUTO: 277 10*3/MM3 (ref 140–450)
PMV BLD AUTO: 10 FL (ref 6–12)
POTASSIUM SERPL-SCNC: 3.5 MMOL/L (ref 3.5–5.2)
PROT SERPL-MCNC: 6.7 G/DL (ref 6–8.5)
RBC # BLD AUTO: 5.55 10*6/MM3 (ref 4.14–5.8)
SODIUM SERPL-SCNC: 138 MMOL/L (ref 136–145)
WBC NRBC COR # BLD AUTO: 11.35 10*3/MM3 (ref 3.4–10.8)

## 2025-03-06 PROCEDURE — 84100 ASSAY OF PHOSPHORUS: CPT | Performed by: INTERNAL MEDICINE

## 2025-03-06 PROCEDURE — 99239 HOSP IP/OBS DSCHRG MGMT >30: CPT | Performed by: INTERNAL MEDICINE

## 2025-03-06 PROCEDURE — 85025 COMPLETE CBC W/AUTO DIFF WBC: CPT | Performed by: INTERNAL MEDICINE

## 2025-03-06 PROCEDURE — 80053 COMPREHEN METABOLIC PANEL: CPT | Performed by: PHYSICIAN ASSISTANT

## 2025-03-06 PROCEDURE — 82550 ASSAY OF CK (CPK): CPT | Performed by: INTERNAL MEDICINE

## 2025-03-06 PROCEDURE — 83735 ASSAY OF MAGNESIUM: CPT | Performed by: PHYSICIAN ASSISTANT

## 2025-03-06 RX ORDER — CARVEDILOL 25 MG/1
25 TABLET ORAL 2 TIMES DAILY WITH MEALS
Qty: 60 TABLET | Refills: 1 | Status: SHIPPED | OUTPATIENT
Start: 2025-03-06

## 2025-03-06 RX ORDER — CLONIDINE HYDROCHLORIDE 0.1 MG/1
0.2 TABLET ORAL 2 TIMES DAILY
Status: DISCONTINUED | OUTPATIENT
Start: 2025-03-06 | End: 2025-03-06 | Stop reason: HOSPADM

## 2025-03-06 RX ORDER — ASPIRIN 81 MG/1
81 TABLET ORAL DAILY
Qty: 30 TABLET | Refills: 1 | Status: SHIPPED | OUTPATIENT
Start: 2025-03-06

## 2025-03-06 RX ORDER — ATORVASTATIN CALCIUM 40 MG/1
40 TABLET, FILM COATED ORAL NIGHTLY
Qty: 30 TABLET | Refills: 1 | Status: SHIPPED | OUTPATIENT
Start: 2025-03-06

## 2025-03-06 RX ADMIN — LEVOTHYROXINE SODIUM 50 MCG: 50 TABLET ORAL at 05:31

## 2025-03-06 RX ADMIN — TICAGRELOR 90 MG: 90 TABLET ORAL at 08:33

## 2025-03-06 RX ADMIN — HYDRALAZINE HYDROCHLORIDE 50 MG: 50 TABLET ORAL at 08:33

## 2025-03-06 RX ADMIN — AMLODIPINE BESYLATE 10 MG: 10 TABLET ORAL at 08:33

## 2025-03-06 RX ADMIN — PALIPERIDONE 9 MG: 3 TABLET, EXTENDED RELEASE ORAL at 09:47

## 2025-03-06 RX ADMIN — LOSARTAN POTASSIUM 100 MG: 50 TABLET, FILM COATED ORAL at 08:33

## 2025-03-06 RX ADMIN — CARVEDILOL 25 MG: 25 TABLET, FILM COATED ORAL at 08:33

## 2025-03-06 RX ADMIN — CLONIDINE HYDROCHLORIDE 0.2 MG: 0.1 TABLET ORAL at 08:33

## 2025-03-06 RX ADMIN — ASPIRIN 81 MG: 81 TABLET, COATED ORAL at 08:33

## 2025-03-06 NOTE — TELEPHONE ENCOUNTER
Patient is being d/c today from [facility] for STEMI. The next available Melly had was on 03/19/25. Not sure if you all could get him in sooner.

## 2025-03-06 NOTE — DISCHARGE SUMMARY
[facility]         CARDIOLOGY DISCHARGE SUMMARY    Patient Name: Anthony Tay  : 1990  MRN: [id number]    Date of Admission: 3/4/2025  Date of Discharge: 3/6/2025  Primary Care Physician: Hillary Worthington APRN      Presenting Problem:   STEMI (ST elevation myocardial infarction) [I21.3]  ST elevation myocardial infarction (STEMI), unspecified artery [I21.3]    Discharge diagnosis    Acute inferior ST segment elevation myocardial infarction, status post primary angioplasty with stent placement to distal right coronary artery    Hypertension, difficult to control        Hospital Course     Hospital Course:  Anthony Tay is a 34 y.o. male with hypertension, which is difficult to control, psychiatric disorder, presented emergency room because of chest pain of 2 days duration, more so on the day of visit.  EKG done in the emergency room and EMS showed acute inferior ST segment elevation myocardial infarction.  He underwent emergent Cardiac catheterization, which showed 99% hazy lesion involving distal right coronary artery which was the culprit lesion.  He underwent primary angioplasty with stent placement of the distal RCA with drug-eluting stents.  Left circumflex artery was noted to have borderline and nonobstructive lesions.    He was admitted to intensive care unit for further evaluation management.  His blood pressure has been difficult to control.  All his home antihypertensive medication including clonidine, losartan, amlodipine, hydrochlorothiazide and hydralazine restarted.  Carvedilol was added to his regimen and the dose was titrated up to the maximum dose of 25 mg twice daily.  In spite of all these measures, blood pressure remained mildly elevated.  Further medication adjustments will be done during follow-up visits.  He was started on statins as well.    Rest of the hospital stay was uneventful except for high blood pressure.  He remained chest pain-free preserver  no arrhythmias on telemetry.  Echocardiogram showed preserved LV function.  There were no right radial arterial access site issues.  He was transferred out of intensive care unit after 24 hours.  He is being discharged home in hemodynamically stable condition.  Extensively counseled regarding medication compliance, especially compliance to dual antiplatelet therapy.      Day of Discharge     Vital Signs:  Temp:  [97.3 °F (36.3 °C)-98.1 °F (36.7 °C)] 97.9 °F (36.6 °C)  Heart Rate:  [] 107  Resp:  [16-20] 18  BP: (149-175)/() 175/113  Physical Exam:    General : Alert, awake, no acute distress  HEENT : No pallor, anicteric  Neck : Supple, no carotid bruit, no JVD  CVS : Regular rate and rhythm, no murmur, rubs or gallops  Lungs: Clear to auscultation bilaterally, no crackles or rhonchi  Neuro : Alert, awake, oriented x 3, no focal neurological deficits  Abdomen: Soft, nontender, bowel sounds heard in all 4 quadrants  Extremities: Warm, well-perfused, no pedal edema    Pertinent  and/or Most Recent Results     LAB RESULTS:      Lab 03/06/25  0424 03/05/25  0231 03/04/25  0935   WBC 11.35* 21.94* 9.24   HEMOGLOBIN 15.2 15.5 16.1   HEMATOCRIT 45.8 47.8 48.4   PLATELETS 277 417 298   NEUTROS ABS 8.22* 19.77* 6.45   IMMATURE GRANS (ABS) 0.05 0.15* 0.04   LYMPHS ABS 2.30 1.27 2.03   MONOS ABS 0.73 0.72 0.67   EOS ABS 0.03 0.01 0.04   MCV 82.5 80.1 80.9         Lab 03/06/25  0424 03/05/25  0231 03/04/25  1326 03/04/25  0935   SODIUM 138 135* 136 135*   POTASSIUM 3.5 4.3 4.0 3.6   CHLORIDE 104 103 103 102   CO2 23.1 20.2* 20.5* 22.0   ANION GAP 10.9 11.8 12.5 11.0   BUN 18 17 10 10   CREATININE 1.30* 1.32* 1.26 1.18   EGFR 73.9 72.6 76.8 83.0   GLUCOSE 144* 126* 138* 114*   CALCIUM 8.7 9.4 9.5 8.8   MAGNESIUM 1.9 2.1 2.0 2.2   PHOSPHORUS 3.6 1.7*  --   --    HEMOGLOBIN A1C  --  5.40  --   --    TSH  --  0.969  --   --          Lab 03/06/25  0424 03/05/25  0231 03/04/25  0935   TOTAL PROTEIN 6.7 7.7 7.6   ALBUMIN  3.6 3.9 3.9   GLOBULIN 3.1 3.8 3.7   ALT (SGPT) 24 33 24   AST (SGOT) 47* 100* 61*   BILIRUBIN 0.4 0.5 0.5   ALK PHOS 92 98 112   LIPASE  --   --  38      Latest Reference Range & Units 03/04/25 09:35 03/04/25 13:26 03/05/25 02:31 03/05/25 03:59 03/06/25 04:24   Creatine Kinase 20 - 200 U/L 187 538 (H) 341 (H)  93   CKMB <=10.40 ng/mL 14.90 (H) 50.65 (H) 31.10 (H)     HS Troponin T <22 ng/L 249 (C) 876 (C) 1,315 (C) 1,208 (C)    Troponin T % Delta Abnormal if >/= 20%     -8    Troponin T Numeric Delta ng/L    -107    proBNP 0.0 - 450.0 pg/mL 286.3               Lab 03/05/25  0231   CHOLESTEROL 152   LDL CHOL 100   HDL CHOL 34*   TRIGLYCERIDES 93             Results for orders placed during the hospital encounter of 03/04/25    Adult Transthoracic Echo Complete w/ Color, Spectral and Contrast if necessary per protocol    Interpretation Summary  •  Left ventricular systolic function is normal. Left ventricular ejection fraction appears to be 56 - 60%.  •  Left ventricular wall thickness is consistent with mild concentric hypertrophy.  •  Left ventricular diastolic function is consistent with (grade I) impaired relaxation.  •  There are no hemodynamically significant valvular abnormalities.  •  It is a technically difficult study.  Contrast used for better endocardial definition.      Labs Pending at Discharge: None        Discharge Details        Discharge Medications        New Medications        Instructions Start Date   Aspirin Low Dose 81 MG EC tablet  Generic drug: aspirin   81 mg, Oral, Daily      atorvastatin 40 MG tablet  Commonly known as: LIPITOR   40 mg, Oral, Nightly      Brilinta 90 MG tablet tablet  Generic drug: ticagrelor   90 mg, Oral, Every 12 Hours Scheduled      carvedilol 25 MG tablet  Commonly known as: COREG   25 mg, Oral, 2 Times Daily With Meals             Continue These Medications        Instructions Start Date   amLODIPine 10 MG tablet  Commonly known as: NORVASC   10 mg, Oral, Daily       cloNIDine 0.2 MG tablet  Commonly known as: CATAPRES   0.2 mg, Oral, 2 Times Daily      FLUoxetine 40 MG capsule  Commonly known as: PROzac   40 mg, Oral, Every Morning, Take with 20mg cap for total dose of 60mg      FLUoxetine 20 MG capsule  Commonly known as: PROzac   20 mg, Oral, Daily, Take with 40mg for total dose of 60mg.      gabapentin 800 MG tablet  Commonly known as: Neurontin   800 mg, Oral, Nightly      hydrALAZINE 50 MG tablet  Commonly known as: APRESOLINE   TAKE 2 TABLETS BY MOUTH EVERY MORNING AND 1 TABLET EVERY EVENING      hydrOXYzine 25 MG tablet  Commonly known as: ATARAX   TAKE 1 TO 2 TABLETS BY MOUTH ONCE DAILY AS NEEDED SEVERE  ANXIETY      levothyroxine 50 MCG tablet  Commonly known as: SYNTHROID, LEVOTHROID   50 mcg, Oral, Daily      losartan 100 MG tablet  Commonly known as: COZAAR   100 mg, Oral, Daily      paliperidone 9 MG 24 hr tablet  Commonly known as: INVEGA   9 mg, Oral, Every Morning             Stop These Medications      diclofenac 75 MG EC tablet  Commonly known as: VOLTAREN     flecainide 50 MG tablet  Commonly known as: TAMBOCOR              Allergies   Allergen Reactions   • Shellfish Allergy Swelling   • Morphine Other (See Comments)     unk         Discharge Disposition:  Home or Self Care    Diet:  Hospital:  No active diet order        Discharge Activity:     As tolerated    CODE STATUS:  Code Status and Medical Interventions: CPR (Attempt to Resuscitate); Full Support   Ordered at: 03/04/25 1134     Level Of Support Discussed With:    Patient     Code Status (Patient has no pulse and is not breathing):    CPR (Attempt to Resuscitate)     Medical Interventions (Patient has pulse or is breathing):    Full Support         Future Appointments   Date Time Provider Department Center   3/19/2025  9:30 AM Melly Tamayo APRN [facility] HFC [facility]   3/20/2025  8:00 AM Hillary Worthington APRN [facility] PC KOU[place]NEY [facility]   3/28/2025  2:00 PM Cee Reno APRN [facility] CD [place] [facility]   4/15/2025  10:40 AM Melly Giang PA-C [facility] BH PCRAD MED       Additional Instructions for the Follow-ups that You Need to Schedule       Ambulatory Referral to Cardiac Rehab   As directed     Additional information on Labs and Follow-ups:      Follow up with [facility] Luis on Wednesday, March 19th at 9:30 am.  They are trying to work you into the schedule earlier, and will contact you if they have a spot open up.     Follow up with RHODA Cha (Cardiology) on Friday, March 28th at 2:00 pm.             Time spent on Discharge including face to face service:  38 minutes    Electronically signed by Misha Bailey MD, 03/06/25, 8:28 AM EST.

## 2025-03-06 NOTE — OUTREACH NOTE
Prep Survey      Flowsheet Row Responses   [facility] patient discharged from? [facility]   Is LACE score < 7 ? Yes   Eligibility [facility]   Date of Admission 03/04/25   Date of Discharge 03/06/25   Discharge Disposition Home or Self Care   Discharge diagnosis STEMI (ST elevation myocardial infarction)  Principal problem   Does the patient have one of the following disease processes/diagnoses(primary or secondary)? Acute MI (STEMI,NSTEMI)   Does the patient have Home health ordered? No   Is there a DME ordered? No   Prep survey completed? Yes            LULA METZ - Registered Nurse

## 2025-03-06 NOTE — DISCHARGE INSTR - LAB
Follow up with [facility] Luis on Wednesday, March 19th at 9:30 am.  They are trying to work you into the schedule earlier, and will contact you if they have a spot open up.     Follow up with RHODA Cha (Cardiology) on Friday, March 28th at 2:00 pm.

## 2025-03-06 NOTE — TELEPHONE ENCOUNTER
Patient is within the required 14 day window, earlier appointment is not required.  However, [facility].  He has not been seen in our office since 3/1/21, which would make him a new patient.  The provider said patient needs to schedule follow up with Hillary Worthington who is listed as his PCP.  Please reschedule/remove patient from schedule.

## 2025-03-06 NOTE — PLAN OF CARE
Goal Outcome Evaluation:              Outcome Evaluation: Patient is alert and oriented. No complaints of pain. On room air. Patient is being discharged.

## 2025-03-07 ENCOUNTER — TRANSITIONAL CARE MANAGEMENT TELEPHONE ENCOUNTER (OUTPATIENT)
Dept: CALL CENTER | Facility: HOSPITAL | Age: 35
End: 2025-03-07
Payer: COMMERCIAL

## 2025-03-07 NOTE — OUTREACH NOTE
Call Center TCM Note      Flowsheet Row Responses   [facility] patient discharged from? [facility]   Does the patient have one of the following disease processes/diagnoses(primary or secondary)? Acute MI (STEMI,NSTEMI)   TCM attempt successful? Yes   Call start time 1317   Call end time 1321   Discharge diagnosis Acute inferior ST segment elevation myocardial infarction, status post primary angioplasty with stent placement to distal right coronary artery   Person spoke with today (if not patient) and relationship Patient   Meds reviewed with patient/caregiver? Yes   Does the patient have all prescriptions related to this admission filled (includes statins,anticoagulants,HTN meds,anti-arrhythmia meds) Yes   Is the patient taking all medications as directed (includes completed medication regime)? Yes   Comments PCP Hillary DUONG. Hospital follow up appt in place with PCP for 3/20  8am.   Does the patient have an appointment with their PCP within 7-14 days of discharge? Yes   Has home health visited the patient within 72 hours of discharge? N/A   Psychosocial issues? No   Did the patient receive a copy of their discharge instructions? Yes   Nursing interventions Reviewed instructions with patient   What is the patient's perception of their health status since discharge? Improving   Nursing interventions Nurse provided patient education   Is the patient/caregiver able to teach back signs and symptoms of when to call for help immediately: Sudden chest discomfort, Sudden discomfort in arms, back, neck or jaw, Shortness of breath at any time   Is the patient/caregiver able to teach back ways to prevent a second heart attack: Take medications, Follow up with MD   Is the patient/caregiver able to teach back the hierarchy of who to call/visit for symptoms/problems? PCP, Specialist, Home health nurse, Urgent Care, ED, 911 Yes   TCM call completed? Yes   Wrap up additional comments 3/28/2025  2:00 PM  HOSPITAL FOLLOW UP   [facility] CARDIOLOGY Rowdy, Cee Wilson, APRN   Call end time 1321   Would this patient benefit from a Referral to [facility] Social Work? No   Is the patient interested in additional calls from an ambulatory ? No            Macarena Hsu RN    3/7/2025, 13:23 EST

## 2025-03-07 NOTE — TELEPHONE ENCOUNTER
Spoke with Anthony and got his appointment that was scheduled with Melly on 03/19/25 cancelled. He is scheduled to see Hillary Worthington on 03/20/25.

## 2025-03-08 ENCOUNTER — PATIENT MESSAGE (OUTPATIENT)
Dept: FAMILY MEDICINE CLINIC | Facility: CLINIC | Age: 35
End: 2025-03-08
Payer: COMMERCIAL

## 2025-03-08 DIAGNOSIS — I10 ESSENTIAL HYPERTENSION: Primary | ICD-10-CM

## 2025-03-11 LAB
QT INTERVAL: 350 MS
QT INTERVAL: 360 MS
QT INTERVAL: 379 MS
QTC INTERVAL: 458 MS
QTC INTERVAL: 469 MS
QTC INTERVAL: 474 MS

## 2025-03-12 ENCOUNTER — TELEPHONE (OUTPATIENT)
Dept: FAMILY MEDICINE CLINIC | Facility: CLINIC | Age: 35
End: 2025-03-12

## 2025-03-12 NOTE — TELEPHONE ENCOUNTER
Called and spoke w/pt.  He is going to call around and find out where we need to send the script for the blood pressure cuff and then we will send it there.

## 2025-03-12 NOTE — TELEPHONE ENCOUNTER
Caller: Isaiah Tay    Relationship: Emergency Contact    Best call back number: [phone]     What orders are you requesting (i.e. lab or imaging): BP MACHINE    In what timeframe would the patient need to come in: AS SOON AS POSSIBLE       Additional notes: PREVIOUS ORDER WAS SENT TO [facility]  -  AND THEY DO NOT FILL THOSE TYPES OF PRESCRIPTIONS --  PLEASE SEND TO SOMEWHERE THAT DOES --   [place]BE [facility] [place]?    PLEASE ADVISE

## 2025-03-14 ENCOUNTER — APPOINTMENT (OUTPATIENT)
Dept: GENERAL RADIOLOGY | Facility: HOSPITAL | Age: 35
End: 2025-03-14
Payer: COMMERCIAL

## 2025-03-14 ENCOUNTER — HOSPITAL ENCOUNTER (OUTPATIENT)
Facility: HOSPITAL | Age: 35
Setting detail: OBSERVATION
Discharge: HOME | End: 2025-03-16
Attending: EMERGENCY MEDICINE | Admitting: STUDENT IN AN ORGANIZED HEALTH CARE EDUCATION/TRAINING PROGRAM
Payer: COMMERCIAL

## 2025-03-14 ENCOUNTER — READMISSION MANAGEMENT (OUTPATIENT)
Dept: CALL CENTER | Facility: HOSPITAL | Age: 35
End: 2025-03-14
Payer: COMMERCIAL

## 2025-03-14 DIAGNOSIS — R06.09 DYSPNEA ON EXERTION: Primary | ICD-10-CM

## 2025-03-14 DIAGNOSIS — I25.2 HISTORY OF ST ELEVATION MYOCARDIAL INFARCTION (STEMI): ICD-10-CM

## 2025-03-14 DIAGNOSIS — R79.89 ELEVATED TROPONIN: ICD-10-CM

## 2025-03-14 PROBLEM — R06.02 SHORTNESS OF BREATH AT REST: Status: ACTIVE | Noted: 2025-03-14

## 2025-03-14 LAB
ALBUMIN SERPL-MCNC: 3.7 G/DL (ref 3.5–5.2)
ALBUMIN/GLOB SERPL: 1 G/DL
ALP SERPL-CCNC: 110 U/L (ref 39–117)
ALT SERPL W P-5'-P-CCNC: 22 U/L (ref 1–41)
ANION GAP SERPL CALCULATED.3IONS-SCNC: 10.6 MMOL/L (ref 5–15)
APTT PPP: 37.5 SECONDS (ref 78–95.9)
AST SERPL-CCNC: 22 U/L (ref 1–40)
BASOPHILS # BLD AUTO: 0.01 10*3/MM3 (ref 0–0.2)
BASOPHILS NFR BLD AUTO: 0.1 % (ref 0–1.5)
BILIRUB SERPL-MCNC: 0.3 MG/DL (ref 0–1.2)
BUN SERPL-MCNC: 11 MG/DL (ref 6–20)
BUN/CREAT SERPL: 8.1 (ref 7–25)
CALCIUM SPEC-SCNC: 9.4 MG/DL (ref 8.6–10.5)
CHLORIDE SERPL-SCNC: 104 MMOL/L (ref 98–107)
CO2 SERPL-SCNC: 21.4 MMOL/L (ref 22–29)
CREAT SERPL-MCNC: 1.36 MG/DL (ref 0.76–1.27)
DEPRECATED RDW RBC AUTO: 40.6 FL (ref 37–54)
EGFRCR SERPLBLD CKD-EPI 2021: 70 ML/MIN/1.73
EOSINOPHIL # BLD AUTO: 0.04 10*3/MM3 (ref 0–0.4)
EOSINOPHIL NFR BLD AUTO: 0.3 % (ref 0.3–6.2)
ERYTHROCYTE [DISTWIDTH] IN BLOOD BY AUTOMATED COUNT: 14 % (ref 12.3–15.4)
FLUAV RNA RESP QL NAA+PROBE: NOT DETECTED
FLUBV RNA RESP QL NAA+PROBE: NOT DETECTED
GEN 5 1HR TROPONIN T REFLEX: 138 NG/L
GLOBULIN UR ELPH-MCNC: 3.7 GM/DL
GLUCOSE SERPL-MCNC: 88 MG/DL (ref 65–99)
HCT VFR BLD AUTO: 43.3 % (ref 37.5–51)
HGB BLD-MCNC: 14.2 G/DL (ref 13–17.7)
HOLD SPECIMEN: NORMAL
HOLD SPECIMEN: NORMAL
IMM GRANULOCYTES # BLD AUTO: 0.06 10*3/MM3 (ref 0–0.05)
IMM GRANULOCYTES NFR BLD AUTO: 0.5 % (ref 0–0.5)
INR PPP: 1.05 (ref 0.86–1.15)
LIPASE SERPL-CCNC: 41 U/L (ref 13–60)
LYMPHOCYTES # BLD AUTO: 1.78 10*3/MM3 (ref 0.7–3.1)
LYMPHOCYTES NFR BLD AUTO: 15.4 % (ref 19.6–45.3)
MAGNESIUM SERPL-MCNC: 1.9 MG/DL (ref 1.6–2.6)
MCH RBC QN AUTO: 26.2 PG (ref 26.6–33)
MCHC RBC AUTO-ENTMCNC: 32.8 G/DL (ref 31.5–35.7)
MCV RBC AUTO: 80 FL (ref 79–97)
MONOCYTES # BLD AUTO: 0.61 10*3/MM3 (ref 0.1–0.9)
MONOCYTES NFR BLD AUTO: 5.3 % (ref 5–12)
NEUTROPHILS NFR BLD AUTO: 78.4 % (ref 42.7–76)
NEUTROPHILS NFR BLD AUTO: 9.03 10*3/MM3 (ref 1.7–7)
NRBC BLD AUTO-RTO: 0 /100 WBC (ref 0–0.2)
NT-PROBNP SERPL-MCNC: 386.3 PG/ML (ref 0–450)
PLATELET # BLD AUTO: 308 10*3/MM3 (ref 140–450)
PMV BLD AUTO: 10.1 FL (ref 6–12)
POTASSIUM SERPL-SCNC: 3.8 MMOL/L (ref 3.5–5.2)
PROT SERPL-MCNC: 7.4 G/DL (ref 6–8.5)
PROTHROMBIN TIME: 14.1 SECONDS (ref 11.8–14.9)
RBC # BLD AUTO: 5.41 10*6/MM3 (ref 4.14–5.8)
RSV RNA RESP QL NAA+PROBE: NOT DETECTED
SARS-COV-2 RNA RESP QL NAA+PROBE: NOT DETECTED
SODIUM SERPL-SCNC: 136 MMOL/L (ref 136–145)
T4 FREE SERPL-MCNC: 1.43 NG/DL (ref 0.92–1.68)
TROPONIN T % DELTA: 1
TROPONIN T NUMERIC DELTA: 1 NG/L
TROPONIN T SERPL HS-MCNC: 137 NG/L
TSH SERPL DL<=0.05 MIU/L-ACNC: 5.31 UIU/ML (ref 0.27–4.2)
WBC NRBC COR # BLD AUTO: 11.53 10*3/MM3 (ref 3.4–10.8)
WHOLE BLOOD HOLD COAG: NORMAL
WHOLE BLOOD HOLD SPECIMEN: NORMAL

## 2025-03-14 PROCEDURE — 71045 X-RAY EXAM CHEST 1 VIEW: CPT

## 2025-03-14 PROCEDURE — 99285 EMERGENCY DEPT VISIT HI MDM: CPT

## 2025-03-14 PROCEDURE — 85610 PROTHROMBIN TIME: CPT | Performed by: EMERGENCY MEDICINE

## 2025-03-14 PROCEDURE — 80053 COMPREHEN METABOLIC PANEL: CPT | Performed by: EMERGENCY MEDICINE

## 2025-03-14 PROCEDURE — 83735 ASSAY OF MAGNESIUM: CPT | Performed by: EMERGENCY MEDICINE

## 2025-03-14 PROCEDURE — 96365 THER/PROPH/DIAG IV INF INIT: CPT

## 2025-03-14 PROCEDURE — 85730 THROMBOPLASTIN TIME PARTIAL: CPT | Performed by: EMERGENCY MEDICINE

## 2025-03-14 PROCEDURE — 93010 ELECTROCARDIOGRAM REPORT: CPT | Performed by: INTERNAL MEDICINE

## 2025-03-14 PROCEDURE — 87637 SARSCOV2&INF A&B&RSV AMP PRB: CPT | Performed by: EMERGENCY MEDICINE

## 2025-03-14 PROCEDURE — 25010000002 HEPARIN (PORCINE) 25000-0.45 UT/250ML-% SOLUTION: Performed by: EMERGENCY MEDICINE

## 2025-03-14 PROCEDURE — 93005 ELECTROCARDIOGRAM TRACING: CPT | Performed by: EMERGENCY MEDICINE

## 2025-03-14 PROCEDURE — 84439 ASSAY OF FREE THYROXINE: CPT | Performed by: STUDENT IN AN ORGANIZED HEALTH CARE EDUCATION/TRAINING PROGRAM

## 2025-03-14 PROCEDURE — 85025 COMPLETE CBC W/AUTO DIFF WBC: CPT | Performed by: EMERGENCY MEDICINE

## 2025-03-14 PROCEDURE — G0378 HOSPITAL OBSERVATION PER HR: HCPCS

## 2025-03-14 PROCEDURE — 83690 ASSAY OF LIPASE: CPT | Performed by: EMERGENCY MEDICINE

## 2025-03-14 PROCEDURE — 83880 ASSAY OF NATRIURETIC PEPTIDE: CPT | Performed by: EMERGENCY MEDICINE

## 2025-03-14 PROCEDURE — 96366 THER/PROPH/DIAG IV INF ADDON: CPT

## 2025-03-14 PROCEDURE — 84484 ASSAY OF TROPONIN QUANT: CPT | Performed by: EMERGENCY MEDICINE

## 2025-03-14 PROCEDURE — 84443 ASSAY THYROID STIM HORMONE: CPT | Performed by: STUDENT IN AN ORGANIZED HEALTH CARE EDUCATION/TRAINING PROGRAM

## 2025-03-14 RX ORDER — SODIUM CHLORIDE 0.9 % (FLUSH) 0.9 %
10 SYRINGE (ML) INJECTION AS NEEDED
Status: DISCONTINUED | OUTPATIENT
Start: 2025-03-14 | End: 2025-03-16 | Stop reason: HOSPADM

## 2025-03-14 RX ORDER — POLYETHYLENE GLYCOL 3350 17 G/17G
17 POWDER, FOR SOLUTION ORAL DAILY PRN
Status: DISCONTINUED | OUTPATIENT
Start: 2025-03-14 | End: 2025-03-16 | Stop reason: HOSPADM

## 2025-03-14 RX ORDER — ASPIRIN 81 MG/1
324 TABLET, CHEWABLE ORAL ONCE
Status: COMPLETED | OUTPATIENT
Start: 2025-03-14 | End: 2025-03-14

## 2025-03-14 RX ORDER — HEPARIN SODIUM 10000 [USP'U]/100ML
7.22 INJECTION, SOLUTION INTRAVENOUS
Status: DISCONTINUED | OUTPATIENT
Start: 2025-03-14 | End: 2025-03-16 | Stop reason: HOSPADM

## 2025-03-14 RX ORDER — NITROGLYCERIN 0.4 MG/1
0.4 TABLET SUBLINGUAL
Status: DISCONTINUED | OUTPATIENT
Start: 2025-03-14 | End: 2025-03-16 | Stop reason: HOSPADM

## 2025-03-14 RX ORDER — AMOXICILLIN 250 MG
2 CAPSULE ORAL 2 TIMES DAILY PRN
Status: DISCONTINUED | OUTPATIENT
Start: 2025-03-14 | End: 2025-03-16 | Stop reason: HOSPADM

## 2025-03-14 RX ORDER — BISACODYL 5 MG/1
5 TABLET, DELAYED RELEASE ORAL DAILY PRN
Status: DISCONTINUED | OUTPATIENT
Start: 2025-03-14 | End: 2025-03-16 | Stop reason: HOSPADM

## 2025-03-14 RX ORDER — SODIUM CHLORIDE 9 MG/ML
40 INJECTION, SOLUTION INTRAVENOUS AS NEEDED
Status: DISCONTINUED | OUTPATIENT
Start: 2025-03-14 | End: 2025-03-16 | Stop reason: HOSPADM

## 2025-03-14 RX ORDER — HYDRALAZINE HYDROCHLORIDE 50 MG/1
50 TABLET, FILM COATED ORAL EVERY EVENING
COMMUNITY
End: 2025-03-16 | Stop reason: HOSPADM

## 2025-03-14 RX ORDER — BISACODYL 10 MG
10 SUPPOSITORY, RECTAL RECTAL DAILY PRN
Status: DISCONTINUED | OUTPATIENT
Start: 2025-03-14 | End: 2025-03-16 | Stop reason: HOSPADM

## 2025-03-14 RX ORDER — SODIUM CHLORIDE 0.9 % (FLUSH) 0.9 %
10 SYRINGE (ML) INJECTION EVERY 12 HOURS SCHEDULED
Status: DISCONTINUED | OUTPATIENT
Start: 2025-03-14 | End: 2025-03-16 | Stop reason: HOSPADM

## 2025-03-14 RX ADMIN — ASPIRIN 324 MG: 81 TABLET, CHEWABLE ORAL at 21:12

## 2025-03-14 RX ADMIN — HEPARIN SODIUM 7.22 UNITS/KG/HR: 10000 INJECTION, SOLUTION INTRAVENOUS at 22:40

## 2025-03-15 ENCOUNTER — APPOINTMENT (OUTPATIENT)
Dept: CT IMAGING | Facility: HOSPITAL | Age: 35
End: 2025-03-15
Payer: COMMERCIAL

## 2025-03-15 PROBLEM — I25.2 HISTORY OF ST ELEVATION MYOCARDIAL INFARCTION (STEMI): Status: ACTIVE | Noted: 2025-03-15

## 2025-03-15 LAB
ANION GAP SERPL CALCULATED.3IONS-SCNC: 12.3 MMOL/L (ref 5–15)
APTT PPP: 43.7 SECONDS (ref 78–95.9)
APTT PPP: 49 SECONDS (ref 78–95.9)
APTT PPP: 66.8 SECONDS (ref 78–95.9)
BASOPHILS # BLD AUTO: 0.02 10*3/MM3 (ref 0–0.2)
BASOPHILS NFR BLD AUTO: 0.2 % (ref 0–1.5)
BUN SERPL-MCNC: 11 MG/DL (ref 6–20)
BUN/CREAT SERPL: 8.6 (ref 7–25)
CALCIUM SPEC-SCNC: 8.7 MG/DL (ref 8.6–10.5)
CHLORIDE SERPL-SCNC: 103 MMOL/L (ref 98–107)
CO2 SERPL-SCNC: 20.7 MMOL/L (ref 22–29)
CREAT SERPL-MCNC: 1.28 MG/DL (ref 0.76–1.27)
DEPRECATED RDW RBC AUTO: 40.3 FL (ref 37–54)
EGFRCR SERPLBLD CKD-EPI 2021: 75.3 ML/MIN/1.73
EOSINOPHIL # BLD AUTO: 0.02 10*3/MM3 (ref 0–0.4)
EOSINOPHIL NFR BLD AUTO: 0.2 % (ref 0.3–6.2)
ERYTHROCYTE [DISTWIDTH] IN BLOOD BY AUTOMATED COUNT: 13.6 % (ref 12.3–15.4)
GLUCOSE SERPL-MCNC: 105 MG/DL (ref 65–99)
HCT VFR BLD AUTO: 43.3 % (ref 37.5–51)
HGB BLD-MCNC: 14.4 G/DL (ref 13–17.7)
IMM GRANULOCYTES # BLD AUTO: 0.04 10*3/MM3 (ref 0–0.05)
IMM GRANULOCYTES NFR BLD AUTO: 0.4 % (ref 0–0.5)
LYMPHOCYTES # BLD AUTO: 2.04 10*3/MM3 (ref 0.7–3.1)
LYMPHOCYTES NFR BLD AUTO: 22.9 % (ref 19.6–45.3)
MCH RBC QN AUTO: 27.2 PG (ref 26.6–33)
MCHC RBC AUTO-ENTMCNC: 33.3 G/DL (ref 31.5–35.7)
MCV RBC AUTO: 81.7 FL (ref 79–97)
MONOCYTES # BLD AUTO: 0.58 10*3/MM3 (ref 0.1–0.9)
MONOCYTES NFR BLD AUTO: 6.5 % (ref 5–12)
NEUTROPHILS NFR BLD AUTO: 6.2 10*3/MM3 (ref 1.7–7)
NEUTROPHILS NFR BLD AUTO: 69.8 % (ref 42.7–76)
NRBC BLD AUTO-RTO: 0 /100 WBC (ref 0–0.2)
PLATELET # BLD AUTO: 279 10*3/MM3 (ref 140–450)
PMV BLD AUTO: 10.2 FL (ref 6–12)
POTASSIUM SERPL-SCNC: 3.8 MMOL/L (ref 3.5–5.2)
RBC # BLD AUTO: 5.3 10*6/MM3 (ref 4.14–5.8)
SODIUM SERPL-SCNC: 136 MMOL/L (ref 136–145)
TROPONIN T SERPL HS-MCNC: 101 NG/L
TROPONIN T SERPL HS-MCNC: 117 NG/L
WBC NRBC COR # BLD AUTO: 8.9 10*3/MM3 (ref 3.4–10.8)

## 2025-03-15 PROCEDURE — 25010000002 HEPARIN (PORCINE) 25000-0.45 UT/250ML-% SOLUTION: Performed by: STUDENT IN AN ORGANIZED HEALTH CARE EDUCATION/TRAINING PROGRAM

## 2025-03-15 PROCEDURE — 84484 ASSAY OF TROPONIN QUANT: CPT | Performed by: STUDENT IN AN ORGANIZED HEALTH CARE EDUCATION/TRAINING PROGRAM

## 2025-03-15 PROCEDURE — 96366 THER/PROPH/DIAG IV INF ADDON: CPT

## 2025-03-15 PROCEDURE — 85025 COMPLETE CBC W/AUTO DIFF WBC: CPT | Performed by: EMERGENCY MEDICINE

## 2025-03-15 PROCEDURE — 80048 BASIC METABOLIC PNL TOTAL CA: CPT | Performed by: STUDENT IN AN ORGANIZED HEALTH CARE EDUCATION/TRAINING PROGRAM

## 2025-03-15 PROCEDURE — G0378 HOSPITAL OBSERVATION PER HR: HCPCS

## 2025-03-15 PROCEDURE — 99222 1ST HOSP IP/OBS MODERATE 55: CPT | Performed by: STUDENT IN AN ORGANIZED HEALTH CARE EDUCATION/TRAINING PROGRAM

## 2025-03-15 PROCEDURE — 85730 THROMBOPLASTIN TIME PARTIAL: CPT | Performed by: STUDENT IN AN ORGANIZED HEALTH CARE EDUCATION/TRAINING PROGRAM

## 2025-03-15 PROCEDURE — 36415 COLL VENOUS BLD VENIPUNCTURE: CPT | Performed by: STUDENT IN AN ORGANIZED HEALTH CARE EDUCATION/TRAINING PROGRAM

## 2025-03-15 PROCEDURE — 25510000001 IOPAMIDOL PER 1 ML: Performed by: STUDENT IN AN ORGANIZED HEALTH CARE EDUCATION/TRAINING PROGRAM

## 2025-03-15 PROCEDURE — 71275 CT ANGIOGRAPHY CHEST: CPT

## 2025-03-15 PROCEDURE — 99214 OFFICE O/P EST MOD 30 MIN: CPT | Performed by: INTERNAL MEDICINE

## 2025-03-15 PROCEDURE — 99232 SBSQ HOSP IP/OBS MODERATE 35: CPT | Performed by: STUDENT IN AN ORGANIZED HEALTH CARE EDUCATION/TRAINING PROGRAM

## 2025-03-15 RX ORDER — PALIPERIDONE 3 MG/1
9 TABLET, EXTENDED RELEASE ORAL EVERY MORNING
Status: DISCONTINUED | OUTPATIENT
Start: 2025-03-15 | End: 2025-03-16 | Stop reason: HOSPADM

## 2025-03-15 RX ORDER — GABAPENTIN 400 MG/1
800 CAPSULE ORAL DAILY
Status: DISCONTINUED | OUTPATIENT
Start: 2025-03-15 | End: 2025-03-16 | Stop reason: HOSPADM

## 2025-03-15 RX ORDER — PRASUGREL 10 MG/1
60 TABLET, FILM COATED ORAL ONCE
Status: COMPLETED | OUTPATIENT
Start: 2025-03-15 | End: 2025-03-15

## 2025-03-15 RX ORDER — LOSARTAN POTASSIUM 50 MG/1
100 TABLET ORAL DAILY
Status: DISCONTINUED | OUTPATIENT
Start: 2025-03-15 | End: 2025-03-16 | Stop reason: HOSPADM

## 2025-03-15 RX ORDER — ASPIRIN 81 MG/1
81 TABLET ORAL DAILY
Status: DISCONTINUED | OUTPATIENT
Start: 2025-03-15 | End: 2025-03-16 | Stop reason: HOSPADM

## 2025-03-15 RX ORDER — IOPAMIDOL 755 MG/ML
100 INJECTION, SOLUTION INTRAVASCULAR
Status: COMPLETED | OUTPATIENT
Start: 2025-03-15 | End: 2025-03-15

## 2025-03-15 RX ORDER — SPIRONOLACTONE 25 MG/1
25 TABLET ORAL DAILY
Status: DISCONTINUED | OUTPATIENT
Start: 2025-03-15 | End: 2025-03-16 | Stop reason: HOSPADM

## 2025-03-15 RX ORDER — ATORVASTATIN CALCIUM 40 MG/1
40 TABLET, FILM COATED ORAL NIGHTLY
Status: DISCONTINUED | OUTPATIENT
Start: 2025-03-15 | End: 2025-03-16 | Stop reason: HOSPADM

## 2025-03-15 RX ORDER — LEVOTHYROXINE SODIUM 50 UG/1
50 TABLET ORAL
Status: DISCONTINUED | OUTPATIENT
Start: 2025-03-15 | End: 2025-03-16 | Stop reason: HOSPADM

## 2025-03-15 RX ORDER — AMLODIPINE BESYLATE 10 MG/1
10 TABLET ORAL DAILY
Status: DISCONTINUED | OUTPATIENT
Start: 2025-03-15 | End: 2025-03-16 | Stop reason: HOSPADM

## 2025-03-15 RX ORDER — CARVEDILOL 25 MG/1
25 TABLET ORAL 2 TIMES DAILY WITH MEALS
Status: DISCONTINUED | OUTPATIENT
Start: 2025-03-15 | End: 2025-03-16 | Stop reason: HOSPADM

## 2025-03-15 RX ORDER — CLONIDINE HYDROCHLORIDE 0.1 MG/1
0.2 TABLET ORAL 2 TIMES DAILY
Status: DISCONTINUED | OUTPATIENT
Start: 2025-03-15 | End: 2025-03-16 | Stop reason: HOSPADM

## 2025-03-15 RX ORDER — PRASUGREL 10 MG/1
10 TABLET, FILM COATED ORAL DAILY
Status: DISCONTINUED | OUTPATIENT
Start: 2025-03-16 | End: 2025-03-16 | Stop reason: HOSPADM

## 2025-03-15 RX ORDER — HYDROXYZINE HYDROCHLORIDE 25 MG/1
25 TABLET, FILM COATED ORAL 2 TIMES DAILY PRN
Status: DISCONTINUED | OUTPATIENT
Start: 2025-03-15 | End: 2025-03-16 | Stop reason: HOSPADM

## 2025-03-15 RX ORDER — PRASUGREL 10 MG/1
10 TABLET, FILM COATED ORAL DAILY
Status: DISCONTINUED | OUTPATIENT
Start: 2025-03-15 | End: 2025-03-15

## 2025-03-15 RX ADMIN — FLUOXETINE HYDROCHLORIDE 40 MG: 20 CAPSULE ORAL at 06:21

## 2025-03-15 RX ADMIN — LEVOTHYROXINE SODIUM 50 MCG: 50 TABLET ORAL at 06:21

## 2025-03-15 RX ADMIN — PALIPERIDONE 9 MG: 3 TABLET, EXTENDED RELEASE ORAL at 08:40

## 2025-03-15 RX ADMIN — HYDROXYZINE HYDROCHLORIDE 25 MG: 25 TABLET, FILM COATED ORAL at 10:42

## 2025-03-15 RX ADMIN — ATORVASTATIN CALCIUM 40 MG: 40 TABLET, FILM COATED ORAL at 20:50

## 2025-03-15 RX ADMIN — CARVEDILOL 25 MG: 25 TABLET, FILM COATED ORAL at 18:29

## 2025-03-15 RX ADMIN — TICAGRELOR 90 MG: 90 TABLET ORAL at 02:02

## 2025-03-15 RX ADMIN — HYDROXYZINE HYDROCHLORIDE 25 MG: 25 TABLET, FILM COATED ORAL at 02:07

## 2025-03-15 RX ADMIN — ATORVASTATIN CALCIUM 40 MG: 40 TABLET, FILM COATED ORAL at 02:02

## 2025-03-15 RX ADMIN — IOPAMIDOL 100 ML: 755 INJECTION, SOLUTION INTRAVENOUS at 07:17

## 2025-03-15 RX ADMIN — FLUOXETINE HYDROCHLORIDE 20 MG: 20 CAPSULE ORAL at 08:40

## 2025-03-15 RX ADMIN — AMLODIPINE BESYLATE 10 MG: 10 TABLET ORAL at 08:40

## 2025-03-15 RX ADMIN — CLONIDINE HYDROCHLORIDE 0.2 MG: 0.1 TABLET ORAL at 20:38

## 2025-03-15 RX ADMIN — ASPIRIN 81 MG: 81 TABLET, COATED ORAL at 08:40

## 2025-03-15 RX ADMIN — SPIRONOLACTONE 25 MG: 25 TABLET ORAL at 11:48

## 2025-03-15 RX ADMIN — HEPARIN SODIUM 10.2 UNITS/KG/HR: 10000 INJECTION, SOLUTION INTRAVENOUS at 11:18

## 2025-03-15 RX ADMIN — LOSARTAN POTASSIUM 100 MG: 50 TABLET, FILM COATED ORAL at 08:40

## 2025-03-15 RX ADMIN — PRASUGREL 60 MG: 10 TABLET, FILM COATED ORAL at 22:20

## 2025-03-15 RX ADMIN — CLONIDINE HYDROCHLORIDE 0.2 MG: 0.1 TABLET ORAL at 08:40

## 2025-03-15 RX ADMIN — TICAGRELOR 90 MG: 90 TABLET ORAL at 08:50

## 2025-03-15 RX ADMIN — GABAPENTIN 800 MG: 400 CAPSULE ORAL at 08:40

## 2025-03-15 RX ADMIN — Medication 10 ML: at 20:38

## 2025-03-15 RX ADMIN — HEPARIN SODIUM 13.2 UNITS/KG/HR: 10000 INJECTION, SOLUTION INTRAVENOUS at 20:38

## 2025-03-15 RX ADMIN — CARVEDILOL 25 MG: 25 TABLET, FILM COATED ORAL at 08:40

## 2025-03-15 NOTE — PLAN OF CARE
Goal Outcome Evaluation:           Progress: improving  Outcome Evaluation: A&Ox4. Hypertension noted throughout the shift, Dr Ambrosio and Amari Sampson notified, spironolactone ordered. All other vital signs table. Medicated for anxiety x1 this shift. Brilinta discontinued and effient orders put in. Heparin titrated up x1 this shift. No other complaints or conerns at this time. Plan of care ongoing .

## 2025-03-15 NOTE — PROGRESS NOTES
[facility]   Hospitalist Progress Note  Date: 3/15/2025  Patient Name: Anthony Tay  : 1990  MRN: [id number]  Date of admission: 3/14/2025  Room/Bed: [facility]/      Subjective   Subjective     Chief Complaint: Shortness of breath    Summary:Anthony Tay is a 34 y.o. male with history of STEMI status post stent to the RCA on 3/4/2025 anxiety, hypertension, depression, Tourette's disorder who presented to the ED complaining of shortness of breath.  He was noted to have elevated troponin.  On heparin drip.  Cardiology consulted.     Interval Followup:   Patient is without chest pain.  He denies shortness of breath.    All systems reviewed and negative except for what is outlined above.      Objective   Objective     Vitals:   Temp:  [97.9 °F (36.6 °C)-98.1 °F (36.7 °C)] 97.9 °F (36.6 °C)  Heart Rate:  [81-96] 86  Resp:  [18-20] 18  BP: (143-169)/() 166/110    Physical Exam   General: NAD  Cardiovascular: RRR, no murmurs   Pulmonary:  CTAB  Psych: Mood and affect appropriate    Result Review    Result Review:  I have personally reviewed these results:  [x]  Laboratory      Lab 03/15/25  1250 03/15/25  0511 25   WBC  --  8.90 11.53*   HEMOGLOBIN  --  14.4 14.2   HEMATOCRIT  --  43.3 43.3   PLATELETS  --  279 308   NEUTROS ABS  --  6.20 9.03*   IMMATURE GRANS (ABS)  --  0.04 0.06*   LYMPHS ABS  --  2.04 1.78   MONOS ABS  --  0.58 0.61   EOS ABS  --  0.02 0.04   MCV  --  81.7 80.0   PROTIME  --   --  14.1   APTT 49.0* 43.7* 37.5*         Lab 03/15/25  0511 25   SODIUM 136  --  136   POTASSIUM 3.8  --  3.8   CHLORIDE 103  --  104   CO2 20.7*  --  21.4*   ANION GAP 12.3  --  10.6   BUN 11  --  11   CREATININE 1.28*  --  1.36*   EGFR 75.3  --  70.0   GLUCOSE 105*  --  88   CALCIUM 8.7  --  9.4   MAGNESIUM  --   --  1.9   TSH  --  5.310*  --          Lab 25  2120   TOTAL PROTEIN 7.4   ALBUMIN 3.7   GLOBULIN 3.7   ALT (SGPT) 22   AST (SGOT) 22   BILIRUBIN  0.3   ALK PHOS 110   LIPASE 41         Lab 03/15/25  0759 03/15/25  0511 03/14/25 2237 03/14/25 2120   PROBNP  --   --   --  386.3   HSTROP T 101* 117* 138* 137*   PROTIME  --   --   --  14.1   INR  --   --   --  1.05                 Brief Urine Lab Results       None          [x]  Microbiology   Microbiology Results (last 10 days)       Procedure Component Value - Date/Time    COVID PRE-OP / PRE-PROCEDURE SCREENING ORDER (NO ISOLATION) - Swab, Nasopharynx [[id number]]  (Normal) Collected: 03/14/25 2122    Lab Status: Final result Specimen: Swab from Nasopharynx Updated: 03/14/25 2208    Narrative:      The following orders were created for panel order COVID PRE-OP / PRE-PROCEDURE SCREENING ORDER (NO ISOLATION) - Swab, Nasopharynx.  Procedure                               Abnormality         Status                     ---------                               -----------         ------                     COVID-19, FLU A/B, RSV P...[[id number]]  Normal              Final result                 Please view results for these tests on the individual orders.    COVID-19, FLU A/B, RSV PCR 1 HR TAT - Swab, Nasopharynx [[id number]]  (Normal) Collected: 03/14/25 2122    Lab Status: Final result Specimen: Swab from Nasopharynx Updated: 03/14/25 2208     COVID19 Not Detected     Influenza A PCR Not Detected     Influenza B PCR Not Detected     RSV, PCR Not Detected    Narrative:      Fact sheet for providers: https://www.fda.gov/media/208926/download    Fact sheet for patients: https://www.fda.gov/media/176822/download    Test performed by PCR.          [x]  Radiology  CT Angiogram Chest Pulmonary Embolism  Result Date: 3/15/2025  Impression: 1.Negative for pulmonary embolus. 2.No acute cardiopulmonary process. Electronically Signed: Tato Garcia MD  3/15/2025 7:35 AM EDT  Workstation ID: [id number]    XR Chest 1 View  Result Date: 3/14/2025  Mild scarring or atelectasis in the bases. Otherwise no active disease.  Electronically Signed: Chinmay Carbajal MD  3/14/2025 9:43 PM EDT  Workstation ID: [id number]    []  EKG/Telemetry   []  Cardiology/Vascular   []  Pathology  []  Old records  []  Other:    Assessment & Plan        Assessment and Plan:    #History of STEMI  #Elevated troponin  Cardiology consulted, appreciate recommendations  Continue aspirin  Start Effient cardiology    #Hyperlipidemia  Continue Crestor    #Hypertension  Continue Coreg  Continue clonidine  Continue losartan  Continue spironolactone  Continue amlodipine    #Hypothyroidism  Continue levothyroxine    #Depression/anxiety  #Tourette's  Continue Prozac  Continue Invega  Continue gabapentin     Discussed with RN.    VTE Prophylaxis:  Pharmacologic VTE prophylaxis orders are present.        CODE STATUS:   Code Status (Patient has no pulse and is not breathing): CPR (Attempt to Resuscitate)  Medical Interventions (Patient has pulse or is breathing): Full Support  Level Of Support Discussed With: Patient      Electronically signed by Haroon Ambrosio MD, 3/15/2025, 15:38 EDT.

## 2025-03-15 NOTE — PLAN OF CARE
Goal Outcome Evaluation:  Plan of Care Reviewed With: patient        Progress: no change  Outcome Evaluation: New admission from ED. medicated for sleep. no other problems or concerns. plan of care ongoing.

## 2025-03-15 NOTE — H&P
[facility]   HOSPITALIST HISTORY AND PHYSICAL  Date: 3/15/2025   Patient Name: Anthony Tay  : 1990  MRN: [id number]  Primary Care Physician:  Hillary Worthington APRN  Date of admission: 3/14/2025    Subjective   Subjective     Chief Complaint: Shortness of breath    HPI:    Anthony Tay is a 34 y.o. male significant past medical history of STEMI status post 1 stent (RCA) on 2025, anxiety, hypertension, depression, Tourette disorder presented to the ED complaining of shortness of breath.  Patient reports that he has been experiencing worsening shortness of breath for the last 2 days and was concerned about it so he decided come to the hospital.  He denies any chest pain, palpitations, dizziness, focal weakness, vision changes, nausea, vomiting, diarrhea.  Patient has been taking aspirin and Brilinta consistently since he was discharged from the hospital after the MI.  In the ER he was noted to have a troponin of 1 37, creatinine 1.36, white blood cell count 11.5, EKG with no signs of acute ischemia, patient was started on heparin drip and was given aspirin 325 mg x 1 per cardiology recommendation that was consulted in the ER.  Patient will be admitted for further management and evaluation.      Personal History     Past Medical History:  Past Medical History:   Diagnosis Date    Allergic rhinitis     Anxiety     Asperger's syndrome     Colon polyps 2014    Depression     GERD (gastroesophageal reflux disease) 2015    Hematuria, microscopic 2014    3/25//2014 large blood, > 300 protein u/a in office on repeat from outpt labs.    Hypertension     REPORTS SBP 170s BASELINE    Microscopic hematuria 2014    Resistant hypertension 2020    S/P right coronary artery (RCA) stent placement     3/4/25 BY DR. MULLINS    STEMI (ST elevation myocardial infarction)     3/4/25    Tourette's     Tourette's disorder 2014       Past Surgical History:  Past Surgical  History:   Procedure Laterality Date    CARDIAC CATHETERIZATION N/A 3/4/2025    Procedure: Left Heart Cath;  Surgeon: Misha Bailey MD;  Location: [facility] CATH INVASIVE LOCATION;  Service: Cardiovascular;  Laterality: N/A;    COLONOSCOPY  07/18/2014    CYSTOSCOPY  04/2014    WNL    POLYPECTOMY  07/18/2014       Family History:   Family History   Problem Relation Age of Onset    Mental illness Mother     No Known Problems Father     Stroke Other     Diabetes type II Other        Social History:   Social History     Socioeconomic History    Marital status: Single   Tobacco Use    Smoking status: Some Days     Current packs/day: 0.05     Average packs/day: 0.1 packs/day for 16.2 years (0.8 ttl pk-yrs)     Types: Cigarettes     Start date: 2009     Passive exposure: Current    Smokeless tobacco: Never    Tobacco comments:     Stopped smoking at age 29; social smoker   Vaping Use    Vaping status: Never Used   Substance and Sexual Activity    Alcohol use: Never    Drug use: Not Currently    Sexual activity: Defer       Home Medications:  FLUoxetine, amLODIPine, aspirin, atorvastatin, carvedilol, cloNIDine, gabapentin, hydrALAZINE, hydrOXYzine, levothyroxine, losartan, paliperidone, and ticagrelor    Allergies:  Allergies   Allergen Reactions    Shellfish Allergy Swelling    Morphine Other (See Comments)     unk       Review of Systems   All systems were reviewed and negative except for: As per HPI otherwise negative    Objective   Objective     Vitals:   Temp:  [97.9 °F (36.6 °C)] 97.9 °F (36.6 °C)  Heart Rate:  [81-92] 81  Resp:  [20] 20  BP: (155-156)/(96-98) 155/96    Physical Exam    Constitutional: Awake, alert, no acute distress   Eyes: Pupils equal, sclerae anicteric, no conjunctival injection   HENT: NCAT, mucous membranes moist   Neck: Supple, no thyromegaly, no lymphadenopathy, trachea midline   Respiratory: Clear to auscultation bilaterally, nonlabored respirations    Cardiovascular: RRR, no murmurs, rubs,  or gallops, palpable pedal pulses bilaterally   Gastrointestinal: Positive bowel sounds, soft, nontender, nondistended   Musculoskeletal: No bilateral ankle edema, no clubbing or cyanosis to extremities   Psychiatric: Appropriate affect, cooperative   Neurologic: Oriented x 3, strength symmetric in all extremities, Cranial Nerves grossly intact to confrontation, speech clear   Skin: No rashes     Result Review    Result Review:  I have personally reviewed the results from the time of this admission to 3/15/2025 00:58 EDT and agree with these findings:  [x]  Laboratory  [x]  Microbiology  [x]  Radiology  [x]  EKG/Telemetry   [x]  Cardiology/Vascular   []  Pathology  []  Old records  []  Other:      Assessment & Plan   Assessment / Plan     Assessment/Plan:   Shortness of breath  Elevated troponin  Hx of STEMI  Leukocytosis  -Cardiology consulted, follow recommendations  -Continue heparin drip as per cardiology recommendations  -Obtain CT angiogram to rule out other acute pathologies  -Continue aspirin and Brilinta tomorrow morning  -Trend troponin    Chronic conditions:   anxiety, hypertension, depression, Tourette disorder  -Continue home meds for chronic conditions.    VTE Prophylaxis:  Pharmacologic VTE prophylaxis orders are present.        CODE STATUS:    Code Status (Patient has no pulse and is not breathing): CPR (Attempt to Resuscitate)  Medical Interventions (Patient has pulse or is breathing): Full Support  Level Of Support Discussed With: Patient      Admission Status:  I believe this patient meets observation status.    Electronically signed by Richard Simeon MD, 03/15/25, 12:58 AM EDT.

## 2025-03-15 NOTE — CONSULTS
Psychiatric   Cardiology Consult Note    Patient Name: Anthony Tay  : 1990  MRN: [id number]  Primary Care Physician:  Hillary Worthington APRN  Referring Physician: No ref. provider found    Date of admission: 3/14/2025    Subjective   Subjective     Reason for Consultation : Shortness of Breath.    Chief Complaint : Dyspnea.    HPI:  Anthony Tay is a 34 y.o. male with a recent Inferior Wall ST elevation Myocardial Infarction treated with primary PCI of the RCA with YASMANY 3.0 18 mm XIENCE Stent (3/4/2025). He had mild LAD and CX and the EF was preserved. He was discharged home few days ago now coming with episodes of shortness of breath. The  shortness of breath is unrelated with exertion. He denies chest pain or palpitations. His EKG showed normal sinus rhythm. The hs Troponin is elevated but trending down when compared with recent STEMI admission. His BP is elevated.     Review of Systems   All systems were reviewed and negative except for: shortness of breath.     Personal History     Past Medical History:   Diagnosis Date    Allergic rhinitis     Anxiety     Asperger's syndrome     Colon polyps 2014    Depression     GERD (gastroesophageal reflux disease) 2015    Hematuria, microscopic 2014    3/25//2014 large blood, > 300 protein u/a in office on repeat from outpt labs.    Hypertension     REPORTS SBP 170s BASELINE    Microscopic hematuria 2014    Resistant hypertension 2020    S/P right coronary artery (RCA) stent placement     3/4/25 BY DR. MULLINS    STEMI (ST elevation myocardial infarction)     3/4/25    Tourette's     Tourette's disorder 2014              Family History: family history includes Diabetes type II in an other family member; Mental illness in his mother; No Known Problems in his father; Stroke in an other family member. Otherwise pertinent FHx was reviewed and not pertinent to current issue.    Social History:  reports that he has  been smoking cigarettes. He started smoking about 16 years ago. He has a 0.8 pack-year smoking history. He has been exposed to tobacco smoke. He has never used smokeless tobacco. He reports that he does not currently use drugs. He reports that he does not drink alcohol.    Home Medications:  FLUoxetine, amLODIPine, aspirin, atorvastatin, carvedilol, cloNIDine, gabapentin, hydrALAZINE, hydrOXYzine, levothyroxine, losartan, paliperidone, and ticagrelor    Allergies:  Allergies   Allergen Reactions    Shellfish Allergy Swelling    Morphine Other (See Comments)     unk       Objective    Objective     Vitals:   Temp:  [97.9 °F (36.6 °C)-98.1 °F (36.7 °C)] 97.9 °F (36.6 °C)  Heart Rate:  [81-96] 86  Resp:  [18-20] 18  BP: (143-169)/() 166/110      Physical Exam:   Constitutional: Awake, alert, No acute distress    Eyes: PERRLA, sclerae anicteric, no conjunctival injection   HENT: NCAT, mucous membranes moist   Neck: Supple, no thyromegaly, no lymphadenopathy, trachea midline   Respiratory: Clear to auscultation bilaterally, nonlabored respirations    Cardiovascular: RRR, no murmurs, rubs, or gallops, palpable pedal pulses bilaterally   Gastrointestinal: Positive bowel sounds, soft, nontender, nondistended   Musculoskeletal: No bilateral ankle edema, no clubbing or cyanosis to extremities   Psychiatric: Appropriate affect, cooperative   Neurologic: Oriented x 3, strength symmetric in all extremities, Cranial Nerves grossly intact to confrontation, speech clear   Skin: No rashes     Result Review    Result Review:  I have personally reviewed the results from the time of this admission to 3/15/2025 14:46 EDT and agree with these findings:  [x]  Laboratory  []  Microbiology  [x]  Radiology  [x]  EKG/Telemetry   [x]  Cardiology/Vascular   []  Pathology  [x]  Old records  []  Other:  Most notable findings include:     CMP          3/6/2025    04:24 3/14/2025    21:20 3/15/2025    05:11   CMP   Glucose 144  88  105    BUN  18  11  11    Creatinine 1.30  1.36  1.28    EGFR 73.9  70.0  75.3    Sodium 138  136  136    Potassium 3.5  3.8  3.8    Chloride 104  104  103    Calcium 8.7  9.4  8.7    Total Protein 6.7  7.4     Albumin 3.6  3.7     Globulin 3.1  3.7     Total Bilirubin 0.4  0.3     Alkaline Phosphatase 92  110     AST (SGOT) 47  22     ALT (SGPT) 24  22     Albumin/Globulin Ratio 1.2  1.0     BUN/Creatinine Ratio 13.8  8.1  8.6    Anion Gap 10.9  10.6  12.3       CBC          3/6/2025    04:24 3/14/2025    21:20 3/15/2025    05:11   CBC   WBC 11.35  11.53  8.90    RBC 5.55  5.41  5.30    Hemoglobin 15.2  14.2  14.4    Hematocrit 45.8  43.3  43.3    MCV 82.5  80.0  81.7    MCH 27.4  26.2  27.2    MCHC 33.2  32.8  33.3    RDW 14.9  14.0  13.6    Platelets 277  308  279       Lab Results   Component Value Date    TROPONINT 101 (C) 03/15/2025         Assessment & Plan   Assessment / Plan     Brief Patient Summary:  Anthony Tay is a 34 y.o. male who had a recent inferior wall STEMI treated with PCI of the RCA on 3/4/2025. His LV function is preserved. His BNP is normal. The EKG is unchanged.     Active Hospital Problems:  Active Hospital Problems    Diagnosis     **Shortness of breath at rest     History of ST elevation myocardial infarction (STEMI)          Plan:     I think his dyspnea is due to the use of ticagrelor. I would switch to Effient and discontinue the Ticagrelor. He is going to receive a loading dose of 60 mg of Effient 12 hours after last Ticagrelor dose and start 10 mg of Effient tomorrow. Given Aldactone 25 mg po daily. Continue with aspirin 81 mg po daily, statins and BP control. Watch renal function. If stable tomorrow with controlled BP and no recurrent symptoms, may be discharge with follow up in Cardiology     Electronically signed by Felipe Sampson MD, 03/15/25, 2:38 PM EDT.

## 2025-03-15 NOTE — OUTREACH NOTE
[facility] Week 2 Survey      Flowsheet Row Responses   [facility] facility patient discharged from? [facility]   Does the patient have one of the following disease processes/diagnoses(primary or secondary)? Acute MI (STEMI,NSTEMI)   Week 2 attempt successful? No   Unsuccessful attempts Attempt 1   Revoke Readmitted            BLADIMIR HILLS - Registered Nurse

## 2025-03-15 NOTE — ED PROVIDER NOTES
Time: 9:12 PM EDT  Date of encounter:  3/14/2025  Independent Historian/Clinical History and Information was obtained by:   Patient    History is limited by: N/A    Chief Complaint: Shortness of breath      History of Present Illness:  Patient is a 34 y.o. year old male who presents to the emergency department for evaluation of shortness of breath and chest pressure    Patient describes 2 days of increasing shortness of breath.  He states his shortness of breath worsens with exertion.  He states he is unable to lie flat on his back due to his sensation is unable to breathe.  He denies any pedal edema or leg pain.  He denies any chest pain.  He does state that he has mild nasal and sinus congestion that has developed recently.  He notes that he had an MI with a stent placed at the beginning of the month but has been doing well in the intervening time until the past 2 days.  He denies fevers or chills.  No cough.      Patient Care Team  Primary Care Provider: Hillary Worthington APRN    Past Medical History:     Allergies   Allergen Reactions    Shellfish Allergy Swelling    Morphine Other (See Comments)     unk     Past Medical History:   Diagnosis Date    Allergic rhinitis     Anxiety     Asperger's syndrome     Colon polyps 07/18/2014    Depression     GERD (gastroesophageal reflux disease) 01/23/2015    Hematuria, microscopic 03/25/2014    3/25//2014 large blood, > 300 protein u/a in office on repeat from outpt labs.    Hypertension     REPORTS SBP 170s BASELINE    Microscopic hematuria 03/25/2014    Resistant hypertension 02/03/2020    S/P right coronary artery (RCA) stent placement     3/4/25 BY DR. MULLINS    STEMI (ST elevation myocardial infarction)     3/4/25    Tourette's     Tourette's disorder 03/06/2014     Past Surgical History:   Procedure Laterality Date    CARDIAC CATHETERIZATION N/A 3/4/2025    Procedure: Left Heart Cath;  Surgeon: Misha Mullins MD;  Location: [facility] CATH INVASIVE LOC[facility];   Service: Cardiovascular;  Laterality: N/A;    COLONOSCOPY  07/18/2014    CYSTOSCOPY  04/2014    WNL    POLYPECTOMY  07/18/2014     Family History   Problem Relation Age of Onset    Mental illness Mother     No Known Problems Father     Stroke Other     Diabetes type II Other        Home Medications:  Prior to Admission medications    Medication Sig Start Date End Date Taking? Authorizing Provider   amLODIPine (NORVASC) 10 MG tablet Take 1 tablet by mouth Daily. 10/28/24   Hillary Worthington APRN   aspirin 81 MG EC tablet Take 1 tablet by mouth Daily. 3/6/25   Misha Bailey MD   atorvastatin (LIPITOR) 40 MG tablet Take 1 tablet by mouth Every Night. 3/6/25   Misha Bailey MD   carvedilol (COREG) 25 MG tablet Take 1 tablet by mouth 2 (Two) Times a Day With Meals. 3/6/25   Misha Bailey MD   cloNIDine (CATAPRES) 0.2 MG tablet Take 1 tablet by mouth 2 (Two) Times a Day. 10/28/24   Hillary Worthington APRN   FLUoxetine (PROzac) 20 MG capsule Take 1 capsule by mouth Daily for 90 days. Take with 40mg for total dose of 60mg. 2/10/25 5/11/25  Melly Giang PA-C   FLUoxetine (PROzac) 40 MG capsule Take 1 capsule by mouth Every Morning for 90 days. Take with 20mg cap for total dose of 60mg 2/10/25 5/11/25  Melly Giang PA-C   gabapentin (Neurontin) 800 MG tablet Take 1 tablet by mouth Every Night for 90 days. 2/10/25 5/11/25  Melly Giang PA-C   hydrALAZINE (APRESOLINE) 50 MG tablet TAKE 2 TABLETS BY MOUTH EVERY MORNING AND 1 TABLET EVERY EVENING 10/28/24   Hillary Worthington APRN   hydrOXYzine (ATARAX) 25 MG tablet TAKE 1 TO 2 TABLETS BY MOUTH ONCE DAILY AS NEEDED SEVERE  ANXIETY 2/10/25   Melly Giang PA-C   levothyroxine (SYNTHROID, LEVOTHROID) 50 MCG tablet Take 1 tablet by mouth Daily. 10/28/24   Hillary Worthington APRN   losartan (COZAAR) 100 MG tablet Take 1 tablet by mouth Daily. 10/28/24   Hillary Worthington APRN   paliperidone (INVEGA) 9 MG 24 hr tablet Take 1 tablet by mouth Every Morning.  "2/10/25   Melly Giang PA-C   ticagrelor (BRILINTA) 90 MG tablet tablet Take 1 tablet by mouth Every 12 (Twelve) Hours. 3/6/25   Misha Bailey MD        Social History:   Social History     Tobacco Use    Smoking status: Some Days     Current packs/day: 0.05     Average packs/day: 0.1 packs/day for 16.2 years (0.8 ttl pk-yrs)     Types: Cigarettes     Start date: 2009     Passive exposure: Current    Smokeless tobacco: Never    Tobacco comments:     Stopped smoking at age 29; social smoker   Vaping Use    Vaping status: Never Used   Substance Use Topics    Alcohol use: Never    Drug use: Not Currently         Review of Systems:  Review of Systems   Constitutional:  Negative for chills and fever.   HENT:  Negative for congestion, ear pain and sore throat.    Eyes:  Negative for pain.   Respiratory:  Positive for shortness of breath. Negative for cough and chest tightness.    Cardiovascular:  Negative for chest pain.   Gastrointestinal:  Negative for abdominal pain, diarrhea, nausea and vomiting.   Genitourinary:  Negative for flank pain and hematuria.   Musculoskeletal:  Negative for joint swelling.   Skin:  Negative for pallor.   Neurological:  Negative for seizures and headaches.   All other systems reviewed and are negative.       Physical Exam:  /90 (BP Location: Left arm, Patient Position: Lying)   Pulse 90   Temp 98.1 °F (36.7 °C) (Oral)   Resp 18   Ht 195.6 cm (77\")   Wt (!) 166 kg (366 lb 6.5 oz)   SpO2 95%   BMI 43.45 kg/m²     Physical Exam  Vitals and nursing note reviewed.   Constitutional:       General: He is not in acute distress.     Appearance: Normal appearance. He is not toxic-appearing.   HENT:      Head: Normocephalic and atraumatic.      Jaw: There is normal jaw occlusion.   Eyes:      General: Lids are normal.      Extraocular Movements: Extraocular movements intact.      Conjunctiva/sclera: Conjunctivae normal.      Pupils: Pupils are equal, round, and reactive to light. "   Cardiovascular:      Rate and Rhythm: Normal rate and regular rhythm.      Pulses: Normal pulses.      Heart sounds: Normal heart sounds.   Pulmonary:      Effort: Pulmonary effort is normal. No respiratory distress.      Breath sounds: Normal breath sounds. No wheezing or rhonchi.   Abdominal:      General: Abdomen is flat.      Palpations: Abdomen is soft.      Tenderness: There is no abdominal tenderness. There is no guarding or rebound.   Musculoskeletal:         General: Normal range of motion.      Cervical back: Normal range of motion and neck supple.      Right lower leg: No edema.      Left lower leg: No edema.   Skin:     General: Skin is warm and dry.   Neurological:      Mental Status: He is alert and oriented to person, place, and time. Mental status is at baseline.   Psychiatric:         Mood and Affect: Mood normal.              Medical Decision Making:      Comorbidities that affect care:    Anxiety, hypertension, history of ST elevation MI 3/4/2025, coronary artery disease    External Notes reviewed:    Hospital Discharge Summary: Hospitalization for ST elevation myocardial infarction 3/4/2025      The following orders were placed and all results were independently analyzed by me:  Orders Placed This Encounter   Procedures    COVID PRE-OP / PRE-PROCEDURE SCREENING ORDER (NO ISOLATION) - Swab, Nasopharynx    COVID-19, FLU A/B, RSV PCR 1 HR TAT - Swab, Nasopharynx    XR Chest 1 View    CT Angiogram Chest Pulmonary Embolism    [place] Draw    High Sensitivity Troponin T    Comprehensive Metabolic Panel    Lipase    BNP    Magnesium    CBC Auto Differential    High Sensitivity Troponin T 1Hr    Protime-INR    aPTT    aPTT    CBC Auto Differential    Basic Metabolic Panel    TSH    T4, Free    aPTT    High Sensitivity Troponin T    High Sensitivity Troponin T    aPTT    Diet: Cardiac; Healthy Heart (2-3 Na+); Fluid Consistency: Thin (IDDSI 0)    Undress & Gown    Continuous Pulse Oximetry    Notify  Provider Platelet Count Less Than 73044    Notify Provider if PTT Not in Therapeutic Range After 24 Hours    Stop Infusion & Notify Provider if Bleeding Occurs    RN To Release aPTT Order 6 Hours After Heparin Bolus & 6 Hours After Any Heparin Rate Change    After 2 Consecutive Therapeutic aPTTs, Obtain aPTT Daily.  If a Rate Adjustment is Necessary, Resume Every 6 Hour aPTT Draws    Vital Signs    Intake & Output    Weigh Patient    Oral Care    Maintain IV Access    Telemetry - Place Orders & Notify Provider of Results When Patient Experiences Acute Chest Pain, Dysrhythmia or Respiratory Distress    Activity - Ad Ashwini    Code Status and Medical Interventions: CPR (Attempt to Resuscitate); Full Support    Cardiology (on-call MD unless specified)    Hospitalist (on-call MD unless specified)    Inpatient Nutrition Consult    Oxygen Therapy- Nasal Cannula; Titrate 1-6 LPM Per SpO2; 90 - 95%    ECG 12 Lead ED Triage Standing Order; Chest Pain    ECG 12 Lead ED Triage Standing Order; Chest Pain    Insert Peripheral IV    Insert Peripheral IV    Initiate Observation Status    CBC & Differential    Green Top (Gel)    Lavender Top    Gold Top - SST    Light Blue Top    CBC & Differential       Medications Given in the Emergency Department:  Medications   sodium chloride 0.9 % flush 10 mL (has no administration in time range)   heparin 06252 units/250 mL (100 units/mL) in 0.45 % NaCl infusion (10.2 Units/kg/hr × 166 kg Intravenous Rate Change (DUAL SIGN) 3/15/25 0620)   heparin bolus from bag solution 4,000 Units (4,000 Units Intravenous Bolus from Bag 3/15/25 0619)   heparin bolus from bag solution 2,000 Units (has no administration in time range)   sodium chloride 0.9 % flush 10 mL (has no administration in time range)   sodium chloride 0.9 % flush 10 mL (has no administration in time range)   sodium chloride 0.9 % infusion 40 mL (has no administration in time range)   nitroglycerin (NITROSTAT) SL tablet 0.4 mg (has no  administration in time range)   sennosides-docusate (PERICOLACE) 8.6-50 MG per tablet 2 tablet (has no administration in time range)     And   polyethylene glycol (MIRALAX) packet 17 g (has no administration in time range)     And   bisacodyl (DULCOLAX) EC tablet 5 mg (has no administration in time range)     And   bisacodyl (DULCOLAX) suppository 10 mg (has no administration in time range)   amLODIPine (NORVASC) tablet 10 mg (has no administration in time range)   aspirin EC tablet 81 mg (has no administration in time range)   atorvastatin (LIPITOR) tablet 40 mg (40 mg Oral Given 3/15/25 0202)   carvedilol (COREG) tablet 25 mg (has no administration in time range)   cloNIDine (CATAPRES) tablet 0.2 mg (has no administration in time range)   FLUoxetine (PROzac) capsule 20 mg (has no administration in time range)   FLUoxetine (PROzac) capsule 40 mg (40 mg Oral Given 3/15/25 0621)   gabapentin (NEURONTIN) capsule 800 mg (has no administration in time range)   hydrOXYzine (ATARAX) tablet 25 mg (25 mg Oral Given 3/15/25 0207)   levothyroxine (SYNTHROID, LEVOTHROID) tablet 50 mcg (50 mcg Oral Given 3/15/25 0621)   losartan (COZAAR) tablet 100 mg (has no administration in time range)   paliperidone (INVEGA) 24 hr tablet 9 mg (has no administration in time range)   ticagrelor (BRILINTA) tablet 90 mg (90 mg Oral Given 3/15/25 0202)   aspirin chewable tablet 324 mg (324 mg Oral Given 3/14/25 2112)   heparin bolus from bag solution 5,000 Units (5,000 Units Intravenous Bolus from Bag 3/14/25 2238)        ED Course:    ED Course as of 03/15/25 0714   Fri Mar 14, 2025   2114 My interpretation of EKG: Sinus rhythm 89, no acute ischemia, T wave inversions in inferior leads unchanged from previous of 3/5/2025 [JS]   2237 I discussed patient's workup and presentation with Dr. Amari Sampson who recommends treating the patient with heparin, monitoring his troponin levels, and monitoring his symptoms.  He recommends admission and he will  evaluate the patient in the morning and likely order echocardiogram. [JS]   2238 Discussed patient's workup and presentation with the hospitalist who agrees to admission.  The patient's airway is intact, vital signs, and respiratory status are safe for admission at this time.   [JS]      ED Course User Index  [JS] Misha Berger MD       Labs:    Lab Results (last 24 hours)       Procedure Component Value Units Date/Time    High Sensitivity Troponin T [[id number]]  (Abnormal) Collected: 03/14/25 2120    Specimen: Blood Updated: 03/14/25 2217     HS Troponin T 137 ng/L     Narrative:      High Sensitive Troponin T Reference Range:  <14.0 ng/L- Negative Female for AMI  <22.0 ng/L- Negative Male for AMI  >=14 - Abnormal Female indicating possible myocardial injury.  >=22 - Abnormal Male indicating possible myocardial injury.   Clinicians would have to utilize clinical acumen, EKG, Troponin, and serial changes to determine if it is an Acute Myocardial Infarction or myocardial injury due to an underlying chronic condition.         CBC & Differential [[id number]]  (Abnormal) Collected: 03/14/25 2120    Specimen: Blood Updated: 03/14/25 2131    Narrative:      The following orders were created for panel order CBC & Differential.  Procedure                               Abnormality         Status                     ---------                               -----------         ------                     CBC Auto Differential[[id number]]        Abnormal            Final result                 Please view results for these tests on the individual orders.    Comprehensive Metabolic Panel [[id number]]  (Abnormal) Collected: 03/14/25 2120    Specimen: Blood Updated: 03/14/25 2203     Glucose 88 mg/dL      BUN 11 mg/dL      Creatinine 1.36 mg/dL      Sodium 136 mmol/L      Potassium 3.8 mmol/L      Chloride 104 mmol/L      CO2 21.4 mmol/L      Calcium 9.4 mg/dL      Total Protein 7.4 g/dL      Albumin 3.7 g/dL      ALT (SGPT) 22 U/L       AST (SGOT) 22 U/L      Alkaline Phosphatase 110 U/L      Total Bilirubin 0.3 mg/dL      Globulin 3.7 gm/dL      A/G Ratio 1.0 g/dL      BUN/Creatinine Ratio 8.1     Anion Gap 10.6 mmol/L      eGFR 70.0 mL/min/1.73     Narrative:      GFR Categories in Chronic Kidney Disease (CKD)      GFR Category          GFR (mL/min/1.73)    Interpretation  G1                     90 or greater         Normal or high (1)  G2                      60-89                Mild decrease (1)  G3a                   45-59                Mild to moderate decrease  G3b                   30-44                Moderate to severe decrease  G4                    15-29                Severe decrease  G5                    14 or less           Kidney failure          (1)In the absence of evidence of kidney disease, neither GFR category G1 or G2 fulfill the criteria for CKD.    eGFR calculation 2021 CKD-EPI creatinine equation, which does not include race as a factor    Lipase [[id number]]  (Normal) Collected: 03/14/25 2120    Specimen: Blood Updated: 03/14/25 2203     Lipase 41 U/L     BNP [[id number]]  (Normal) Collected: 03/14/25 2120    Specimen: Blood Updated: 03/14/25 2159     proBNP 386.3 pg/mL     Narrative:      This assay is used as an aid in the diagnosis of individuals suspected of having heart failure. It can be used as an aid in the diagnosis of acute decompensated heart failure (ADHF) in patients presenting with signs and symptoms of ADHF to the emergency department (ED). In addition, NT-proBNP of <300 pg/mL indicates ADHF is not likely.    Age Range Result Interpretation  NT-proBNP Concentration (pg/mL:      <50             Positive            >450                   Gray                 300-450                    Negative             <300    50-75           Positive            >900                  Gray                300-900                  Negative            <300      >75             Positive            >1800                   Payne                300-1800                  Negative            <300    Magnesium [[id number]]  (Normal) Collected: 03/14/25 2120    Specimen: Blood Updated: 03/14/25 2203     Magnesium 1.9 mg/dL     CBC Auto Differential [[id number]]  (Abnormal) Collected: 03/14/25 2120    Specimen: Blood Updated: 03/14/25 2131     WBC 11.53 10*3/mm3      RBC 5.41 10*6/mm3      Hemoglobin 14.2 g/dL      Hematocrit 43.3 %      MCV 80.0 fL      MCH 26.2 pg      MCHC 32.8 g/dL      RDW 14.0 %      RDW-SD 40.6 fl      MPV 10.1 fL      Platelets 308 10*3/mm3      Neutrophil % 78.4 %      Lymphocyte % 15.4 %      Monocyte % 5.3 %      Eosinophil % 0.3 %      Basophil % 0.1 %      Immature Grans % 0.5 %      Neutrophils, Absolute 9.03 10*3/mm3      Lymphocytes, Absolute 1.78 10*3/mm3      Monocytes, Absolute 0.61 10*3/mm3      Eosinophils, Absolute 0.04 10*3/mm3      Basophils, Absolute 0.01 10*3/mm3      Immature Grans, Absolute 0.06 10*3/mm3      nRBC 0.0 /100 WBC     Protime-INR [[id number]]  (Normal) Collected: 03/14/25 2120    Specimen: Blood Updated: 03/14/25 2234     Protime 14.1 Seconds      INR 1.05    Narrative:      Suggested Therapeutic Ranges For Oral Anticoagulant Therapy:  Level of Therapy                      INR Target Range  Standard Dose                            2.0-3.0  High Dose                                2.5-3.5  Patients not receiving anticoagulant  Therapy Normal Range                     0.86-1.15    aPTT [[id number]]  (Abnormal) Collected: 03/14/25 2120    Specimen: Blood Updated: 03/14/25 2234     PTT 37.5 seconds     COVID PRE-OP / PRE-PROCEDURE SCREENING ORDER (NO ISOLATION) - Swab, Nasopharynx [[id number]]  (Normal) Collected: 03/14/25 2122    Specimen: Swab from Nasopharynx Updated: 03/14/25 2208    Narrative:      The following orders were created for panel order COVID PRE-OP / PRE-PROCEDURE SCREENING ORDER (NO ISOLATION) - Swab, Nasopharynx.  Procedure                               Abnormality          Status                     ---------                               -----------         ------                     COVID-19, FLU A/B, RSV P...[[id number]]  Normal              Final result                 Please view results for these tests on the individual orders.    COVID-19, FLU A/B, RSV PCR 1 HR TAT - Swab, Nasopharynx [[id number]]  (Normal) Collected: 03/14/25 2122    Specimen: Swab from Nasopharynx Updated: 03/14/25 2208     COVID19 Not Detected     Influenza A PCR Not Detected     Influenza B PCR Not Detected     RSV, PCR Not Detected    Narrative:      Fact sheet for providers: https://www.fda.gov/media/422111/download    Fact sheet for patients: https://www.fda.gov/media/662895/download    Test performed by PCR.    High Sensitivity Troponin T 1Hr [[id number]]  (Abnormal) Collected: 03/14/25 2237    Specimen: Blood Updated: 03/14/25 2347     HS Troponin T 138 ng/L      Troponin T Numeric Delta 1 ng/L      Troponin T % Delta 1    Narrative:      High Sensitive Troponin T Reference Range:  <14.0 ng/L- Negative Female for AMI  <22.0 ng/L- Negative Male for AMI  >=14 - Abnormal Female indicating possible myocardial injury.  >=22 - Abnormal Male indicating possible myocardial injury.   Clinicians would have to utilize clinical acumen, EKG, Troponin, and serial changes to determine if it is an Acute Myocardial Infarction or myocardial injury due to an underlying chronic condition.         TSH [[id number]]  (Abnormal) Collected: 03/14/25 2237    Specimen: Blood Updated: 03/14/25 2334     TSH 5.310 uIU/mL     T4, Free [[id number]]  (Normal) Collected: 03/14/25 2237    Specimen: Blood Updated: 03/14/25 2334     Free T4 1.43 ng/dL     CBC & Differential [[id number]]  (Abnormal) Collected: 03/15/25 0511    Specimen: Blood from Hand, Left Updated: 03/15/25 0549    Narrative:      The following orders were created for panel order CBC & Differential.  Procedure                               Abnormality         Status                      ---------                               -----------         ------                     CBC Auto Differential[[id number]]        Abnormal            Final result                 Please view results for these tests on the individual orders.    CBC Auto Differential [[id number]]  (Abnormal) Collected: 03/15/25 0511    Specimen: Blood from Hand, Left Updated: 03/15/25 0549     WBC 8.90 10*3/mm3      RBC 5.30 10*6/mm3      Hemoglobin 14.4 g/dL      Hematocrit 43.3 %      MCV 81.7 fL      MCH 27.2 pg      MCHC 33.3 g/dL      RDW 13.6 %      RDW-SD 40.3 fl      MPV 10.2 fL      Platelets 279 10*3/mm3      Neutrophil % 69.8 %      Lymphocyte % 22.9 %      Monocyte % 6.5 %      Eosinophil % 0.2 %      Basophil % 0.2 %      Immature Grans % 0.4 %      Neutrophils, Absolute 6.20 10*3/mm3      Lymphocytes, Absolute 2.04 10*3/mm3      Monocytes, Absolute 0.58 10*3/mm3      Eosinophils, Absolute 0.02 10*3/mm3      Basophils, Absolute 0.02 10*3/mm3      Immature Grans, Absolute 0.04 10*3/mm3      nRBC 0.0 /100 WBC     Basic Metabolic Panel [[id number]]  (Abnormal) Collected: 03/15/25 0511    Specimen: Blood from Hand, Left Updated: 03/15/25 0605     Glucose 105 mg/dL      BUN 11 mg/dL      Creatinine 1.28 mg/dL      Sodium 136 mmol/L      Potassium 3.8 mmol/L      Chloride 103 mmol/L      CO2 20.7 mmol/L      Calcium 8.7 mg/dL      BUN/Creatinine Ratio 8.6     Anion Gap 12.3 mmol/L      eGFR 75.3 mL/min/1.73     Narrative:      GFR Categories in Chronic Kidney Disease (CKD)      GFR Category          GFR (mL/min/1.73)    Interpretation  G1                     90 or greater         Normal or high (1)  G2                      60-89                Mild decrease (1)  G3a                   45-59                Mild to moderate decrease  G3b                   30-44                Moderate to severe decrease  G4                    15-29                Severe decrease  G5                    14 or less           Kidney  failure          (1)In the absence of evidence of kidney disease, neither GFR category G1 or G2 fulfill the criteria for CKD.    eGFR calculation 2021 CKD-EPI creatinine equation, which does not include race as a factor    aPTT [[id number]]  (Abnormal) Collected: 03/15/25 0511    Specimen: Blood from Hand, Left Updated: 03/15/25 0555     PTT 43.7 seconds     High Sensitivity Troponin T [[id number]]  (Abnormal) Collected: 03/15/25 0511    Specimen: Blood from Hand, Left Updated: 03/15/25 0637     HS Troponin T 117 ng/L     Narrative:      High Sensitive Troponin T Reference Range:  <14.0 ng/L- Negative Female for AMI  <22.0 ng/L- Negative Male for AMI  >=14 - Abnormal Female indicating possible myocardial injury.  >=22 - Abnormal Male indicating possible myocardial injury.   Clinicians would have to utilize clinical acumen, EKG, Troponin, and serial changes to determine if it is an Acute Myocardial Infarction or myocardial injury due to an underlying chronic condition.                  Imaging:    XR Chest 1 View  Result Date: 3/14/2025  XR CHEST 1 VW Date of Exam: 3/14/2025 9:40 PM EDT Indication: Chest Pain Triage Protocol Comparison: 3/4/2025 Findings: There is thoracic dextroscoliosis. Cardiac and mediastinal contours are normal. There is some mild scarring or atelectasis in the bases. The lungs are otherwise clear. Pulmonary vascularity is normal. No pneumothorax.     Mild scarring or atelectasis in the bases. Otherwise no active disease. Electronically Signed: Chinmay Carbajal MD  3/14/2025 9:43 PM EDT  Workstation ID: [id number]        Differential Diagnosis and Discussion:    Chest Pain:  Based on the patient's signs and symptoms, I considered aortic dissection, myocardial infaction, pulmonary embolism, cardiac tamponade, pericarditis, pneumothorax, musculoskeletal chest pain and other differential diagnosis as an etiology of the patient's chest pain.   Dyspnea: Differential diagnosis includes but is not limited  to metabolic acidosis, neurological disorders, psychogenic, asthma, pneumothorax, upper airway obstruction, COPD, pneumonia, noncardiogenic pulmonary edema, interstitial lung disease, anemia, congestive heart failure, and pulmonary embolism    PROCEDURES:    Labs were collected in the emergency department and all labs were reviewed and interpreted by me.  X-ray were performed in the emergency department and all X-ray impressions were independently interpreted by me.  An EKG was performed and the EKG was interpreted by me.    ECG 12 Lead ED Triage Standing Order; Chest Pain   Preliminary Result   HEART RATE=89  bpm   RR Nrwtfzeo=460  ms   MI Yfzzkkug=256  ms   P Horizontal Axis=-17  deg   P Front Axis=63  deg   QRSD Interval=85  ms   QT Iaceaqre=921  ms   VPyM=045  ms   QRS Axis=-6  deg   T Wave Axis=-24  deg   - ABNORMAL ECG -   Sinus rhythm   Probable left atrial enlargement   Inferior infarct, age indeterminate   Date and Time of Study:2025-03-14 21:11:45          Procedures    MDM     Amount and/or Complexity of Data Reviewed  Decide to obtain previous medical records or to obtain history from someone other than the patient: yes                       Patient Care Considerations:    PERC: I used the PERC score to risk stratify the patient for PE and a CT of the chest was considered but ultimately not indicated in today's visit.      Consultants/Shared Management Plan:    Hospitalist: I have discussed the case with the hospitalist who agrees to accept the patient for admission.  Consultant: I have discussed the case with cardiology who agrees to consult on the patient.    Social Determinants of Health:    Patient is independent, reliable, and has access to care.       Disposition and Care Coordination:    Admit:   Through independent evaluation of the patient's history, physical, and imperical data, the patient meets criteria for inpatient admission to the hospital.        Final diagnoses:   Dyspnea on exertion    Elevated troponin        ED Disposition       ED Disposition   Decision to Admit    Condition   --    Comment   Level of Care: Telemetry [5]   Diagnosis: Shortness of breath at rest [[id number]]                 This medical record created using voice recognition software.             Misha Berger MD  03/15/25 0714

## 2025-03-16 ENCOUNTER — READMISSION MANAGEMENT (OUTPATIENT)
Dept: CALL CENTER | Facility: HOSPITAL | Age: 35
End: 2025-03-16
Payer: COMMERCIAL

## 2025-03-16 VITALS
RESPIRATION RATE: 18 BRPM | DIASTOLIC BLOOD PRESSURE: 106 MMHG | OXYGEN SATURATION: 97 % | WEIGHT: 315 LBS | HEART RATE: 90 BPM | SYSTOLIC BLOOD PRESSURE: 158 MMHG | HEIGHT: 77 IN | TEMPERATURE: 98.1 F | BODY MASS INDEX: 37.19 KG/M2

## 2025-03-16 PROBLEM — R06.02 SHORTNESS OF BREATH AT REST: Status: RESOLVED | Noted: 2025-03-14 | Resolved: 2025-03-16

## 2025-03-16 LAB
ANION GAP SERPL CALCULATED.3IONS-SCNC: 10.9 MMOL/L (ref 5–15)
APTT PPP: 103.4 SECONDS (ref 78–95.9)
APTT PPP: 65.3 SECONDS (ref 78–95.9)
BUN SERPL-MCNC: 13 MG/DL (ref 6–20)
BUN/CREAT SERPL: 10.3 (ref 7–25)
CALCIUM SPEC-SCNC: 8.8 MG/DL (ref 8.6–10.5)
CHLORIDE SERPL-SCNC: 105 MMOL/L (ref 98–107)
CO2 SERPL-SCNC: 22.1 MMOL/L (ref 22–29)
CREAT SERPL-MCNC: 1.26 MG/DL (ref 0.76–1.27)
DEPRECATED RDW RBC AUTO: 40.2 FL (ref 37–54)
EGFRCR SERPLBLD CKD-EPI 2021: 76.8 ML/MIN/1.73
ERYTHROCYTE [DISTWIDTH] IN BLOOD BY AUTOMATED COUNT: 13.8 % (ref 12.3–15.4)
GLUCOSE SERPL-MCNC: 110 MG/DL (ref 65–99)
HCT VFR BLD AUTO: 43 % (ref 37.5–51)
HGB BLD-MCNC: 14.2 G/DL (ref 13–17.7)
MCH RBC QN AUTO: 26.6 PG (ref 26.6–33)
MCHC RBC AUTO-ENTMCNC: 33 G/DL (ref 31.5–35.7)
MCV RBC AUTO: 80.5 FL (ref 79–97)
PLATELET # BLD AUTO: 269 10*3/MM3 (ref 140–450)
PMV BLD AUTO: 10.1 FL (ref 6–12)
POTASSIUM SERPL-SCNC: 3.8 MMOL/L (ref 3.5–5.2)
RBC # BLD AUTO: 5.34 10*6/MM3 (ref 4.14–5.8)
SODIUM SERPL-SCNC: 138 MMOL/L (ref 136–145)
WBC NRBC COR # BLD AUTO: 10.6 10*3/MM3 (ref 3.4–10.8)

## 2025-03-16 PROCEDURE — 80048 BASIC METABOLIC PNL TOTAL CA: CPT | Performed by: STUDENT IN AN ORGANIZED HEALTH CARE EDUCATION/TRAINING PROGRAM

## 2025-03-16 PROCEDURE — 96366 THER/PROPH/DIAG IV INF ADDON: CPT

## 2025-03-16 PROCEDURE — 85027 COMPLETE CBC AUTOMATED: CPT | Performed by: STUDENT IN AN ORGANIZED HEALTH CARE EDUCATION/TRAINING PROGRAM

## 2025-03-16 PROCEDURE — 99239 HOSP IP/OBS DSCHRG MGMT >30: CPT | Performed by: STUDENT IN AN ORGANIZED HEALTH CARE EDUCATION/TRAINING PROGRAM

## 2025-03-16 PROCEDURE — 36415 COLL VENOUS BLD VENIPUNCTURE: CPT | Performed by: STUDENT IN AN ORGANIZED HEALTH CARE EDUCATION/TRAINING PROGRAM

## 2025-03-16 PROCEDURE — 99214 OFFICE O/P EST MOD 30 MIN: CPT | Performed by: INTERNAL MEDICINE

## 2025-03-16 PROCEDURE — G0378 HOSPITAL OBSERVATION PER HR: HCPCS

## 2025-03-16 PROCEDURE — 85730 THROMBOPLASTIN TIME PARTIAL: CPT | Performed by: STUDENT IN AN ORGANIZED HEALTH CARE EDUCATION/TRAINING PROGRAM

## 2025-03-16 PROCEDURE — 25010000002 HEPARIN (PORCINE) 25000-0.45 UT/250ML-% SOLUTION: Performed by: STUDENT IN AN ORGANIZED HEALTH CARE EDUCATION/TRAINING PROGRAM

## 2025-03-16 RX ORDER — CLONIDINE HYDROCHLORIDE 0.2 MG/1
0.2 TABLET ORAL 2 TIMES DAILY
Qty: 60 TABLET | Refills: 0 | Status: SHIPPED | OUTPATIENT
Start: 2025-03-16

## 2025-03-16 RX ORDER — PRASUGREL 10 MG/1
10 TABLET, FILM COATED ORAL DAILY
Qty: 30 TABLET | Refills: 0 | Status: SHIPPED | OUTPATIENT
Start: 2025-03-17

## 2025-03-16 RX ORDER — WEIGH SCALE
1 MISCELLANEOUS MISCELLANEOUS DAILY
Qty: 1 EACH | Refills: 0 | Status: SHIPPED | OUTPATIENT
Start: 2025-03-16

## 2025-03-16 RX ORDER — AMLODIPINE BESYLATE 10 MG/1
10 TABLET ORAL DAILY
Qty: 30 TABLET | Refills: 0 | Status: SHIPPED | OUTPATIENT
Start: 2025-03-17

## 2025-03-16 RX ORDER — LOSARTAN POTASSIUM 100 MG/1
100 TABLET ORAL DAILY
Qty: 30 TABLET | Refills: 0 | Status: SHIPPED | OUTPATIENT
Start: 2025-03-17

## 2025-03-16 RX ORDER — SPIRONOLACTONE 25 MG/1
25 TABLET ORAL DAILY
Qty: 30 TABLET | Refills: 0 | Status: SHIPPED | OUTPATIENT
Start: 2025-03-17

## 2025-03-16 RX ADMIN — PRASUGREL 10 MG: 10 TABLET, FILM COATED ORAL at 09:22

## 2025-03-16 RX ADMIN — CLONIDINE HYDROCHLORIDE 0.2 MG: 0.1 TABLET ORAL at 09:26

## 2025-03-16 RX ADMIN — ASPIRIN 81 MG: 81 TABLET, COATED ORAL at 08:25

## 2025-03-16 RX ADMIN — HEPARIN SODIUM 13.2 UNITS/KG/HR: 10000 INJECTION, SOLUTION INTRAVENOUS at 06:34

## 2025-03-16 RX ADMIN — Medication 10 ML: at 08:24

## 2025-03-16 RX ADMIN — PALIPERIDONE 9 MG: 3 TABLET, EXTENDED RELEASE ORAL at 09:22

## 2025-03-16 RX ADMIN — FLUOXETINE HYDROCHLORIDE 40 MG: 20 CAPSULE ORAL at 06:34

## 2025-03-16 RX ADMIN — FLUOXETINE HYDROCHLORIDE 20 MG: 20 CAPSULE ORAL at 08:25

## 2025-03-16 RX ADMIN — CARVEDILOL 25 MG: 25 TABLET, FILM COATED ORAL at 08:25

## 2025-03-16 RX ADMIN — LEVOTHYROXINE SODIUM 50 MCG: 50 TABLET ORAL at 06:33

## 2025-03-16 RX ADMIN — GABAPENTIN 800 MG: 400 CAPSULE ORAL at 08:25

## 2025-03-16 RX ADMIN — AMLODIPINE BESYLATE 10 MG: 10 TABLET ORAL at 09:22

## 2025-03-16 RX ADMIN — LOSARTAN POTASSIUM 100 MG: 50 TABLET, FILM COATED ORAL at 08:25

## 2025-03-16 RX ADMIN — SPIRONOLACTONE 25 MG: 25 TABLET ORAL at 08:25

## 2025-03-16 NOTE — OUTREACH NOTE
Prep Survey      Flowsheet Row Responses   [facility] patient discharged from? [facility]   Is LACE score < 7 ? Yes   Eligibility [facility]   Date of Admission 03/14/25   Date of Discharge 03/16/25   Discharge Disposition Home or Self Care   Discharge diagnosis Shortness of breath at rest   Does the patient have one of the following disease processes/diagnoses(primary or secondary)? Other   Does the patient have Home health ordered? No   Is there a DME ordered? No   Prep survey completed? Yes            Amna GIBBS - Registered Nurse

## 2025-03-16 NOTE — PLAN OF CARE
Goal Outcome Evaluation:  Plan of Care Reviewed With: patient        Progress: improving  Outcome Evaluation: alert and oriented, discharging today. 3/16/25

## 2025-03-16 NOTE — PLAN OF CARE
Goal Outcome Evaluation:  Plan of Care Reviewed With: patient        Progress: no change  Outcome Evaluation: alert and oriented  blood pressures 140s/90s  heparin drip infusing rate titrated per policy. no complaints this shift. plan of care ongoing.

## 2025-03-16 NOTE — DISCHARGE SUMMARY
[facility]         HOSPITALIST  DISCHARGE SUMMARY    Patient Name: Anthony Tay  : 1990  MRN: [id number]    Date of Admission: 3/14/2025  Date of Discharge:  3/16/25  Primary Care Physician: Hillary Worthington APRN    Consults       Date and Time Order Name Status Description    3/14/2025 10:25 PM Hospitalist (on-call MD unless specified)      3/14/2025 10:19 PM Cardiology (on-call MD unless specified)              Active and Resolved Hospital Problems:  Active Hospital Problems    Diagnosis POA    History of ST elevation myocardial infarction (STEMI) [I25.2] Not Applicable      Resolved Hospital Problems    Diagnosis POA    **Shortness of breath at rest [R06.02] Yes       Hospital Course     Hospital Course:  Anthony Tay is a 34 y.o. male with history of STEMI status post stent to the RCA on 3/4/2025 anxiety, hypertension, depression, Tourette's disorder who presented to the ED complaining of shortness of breath.  He was noted to have elevated troponin.  On heparin drip.  Cardiology consulted. No intervention necessary though stopped ticragelor and started Effient. Patient started on spironolactone. His chest pain and SOB resolved. Patient's BP improved. He is medically stable for discharge with follow up with PCP and cardiology.       DISCHARGE Follow Up Recommendations for labs and diagnostics:   -follow up with PCP, check BP, titrate medications  -follow up with cardiology       Day of Discharge     Vital Signs:  Temp:  [97.9 °F (36.6 °C)-98.7 °F (37.1 °C)] 98.1 °F (36.7 °C)  Heart Rate:  [86-99] 90  Resp:  [18-20] 18  BP: (142-168)/() 158/106  Physical Exam:   GEN: NAD  CV: RRR  Pulm: CTAB  Neuro: alert and oriented       Discharge Details        Discharge Medications        New Medications        Instructions Start Date   Advanced One Step BP Monitor misc   1 each, Not Applicable, Daily      prasugrel 10 MG tablet  Commonly known as: EFFIENT   10 mg, Oral, Daily    Start Date: March 17, 2025     spironolactone 25 MG tablet  Commonly known as: ALDACTONE   25 mg, Oral, Daily   Start Date: March 17, 2025            Changes to Medications        Instructions Start Date   hydrOXYzine 25 MG tablet  Commonly known as: ATARAX  What changed:   how much to take  how to take this  when to take this  reasons to take this   TAKE 1 TO 2 TABLETS BY MOUTH ONCE DAILY AS NEEDED SEVERE  ANXIETY             Continue These Medications        Instructions Start Date   amLODIPine 10 MG tablet  Commonly known as: NORVASC   10 mg, Oral, Daily   Start Date: March 17, 2025     Aspirin Low Dose 81 MG EC tablet  Generic drug: aspirin   81 mg, Oral, Daily      atorvastatin 40 MG tablet  Commonly known as: LIPITOR   40 mg, Oral, Nightly      carvedilol 25 MG tablet  Commonly known as: COREG   25 mg, Oral, 2 Times Daily With Meals      cloNIDine 0.2 MG tablet  Commonly known as: CATAPRES   0.2 mg, Oral, 2 Times Daily      FLUoxetine 40 MG capsule  Commonly known as: PROzac   40 mg, Oral, Every Morning, Take with 20mg cap for total dose of 60mg      FLUoxetine 20 MG capsule  Commonly known as: PROzac   20 mg, Oral, Daily, Take with 40mg for total dose of 60mg.      gabapentin 800 MG tablet  Commonly known as: Neurontin   800 mg, Oral, Nightly      levothyroxine 50 MCG tablet  Commonly known as: SYNTHROID, LEVOTHROID   50 mcg, Oral, Daily      losartan 100 MG tablet  Commonly known as: COZAAR   100 mg, Oral, Daily   Start Date: March 17, 2025     paliperidone 9 MG 24 hr tablet  Commonly known as: INVEGA   9 mg, Oral, Every Morning             Stop These Medications      Brilinta 90 MG tablet tablet  Generic drug: ticagrelor     hydrALAZINE 50 MG tablet  Commonly known as: APRESOLINE              Allergies   Allergen Reactions    Shellfish Allergy Swelling    Morphine Other (See Comments)     unk       Discharge Disposition:  home    Diet:  Hospital:  Diet Order   Procedures    Diet: Cardiac; Healthy Heart (2-3  Na+); Fluid Consistency: Thin (IDDSI 0)       Discharge Activity:   Activity Instructions       Activity as Tolerated              CODE STATUS:  Code Status and Medical Interventions: CPR (Attempt to Resuscitate); Full Support   Ordered at: 03/14/25 2301     Code Status (Patient has no pulse and is not breathing):    CPR (Attempt to Resuscitate)     Medical Interventions (Patient has pulse or is breathing):    Full Support     Level Of Support Discussed With:    Patient         Future Appointments   Date Time Provider Department [place]   3/20/2025  8:00 AM Hillary Worthington APRN [facility] [place] [facility]   3/28/2025  2:00 PM Cee Reno APRN [facility] [place] [facility]   4/11/2025  9:00 AM [facility] CARD REHAB - INITAL EVAL [facility] CAR [facility]   4/15/2025 10:40 AM Melly Giang PA-C [facility] PCRAD [facility]       Additional Instructions for the Follow-ups that You Need to Schedule       Discharge Follow-up with PCP   As directed       Currently Documented PCP:    Hillary Worthington APRN    PCP Phone Number:    [phone]     Follow Up Details: 1 week                Pertinent  and/or Most Recent Results         LAB RESULTS:      Lab 03/16/25  0223 03/15/25  2005 03/15/25  1250 03/15/25  0511 03/14/25  2120   WBC 10.60  --   --  8.90 11.53*   HEMOGLOBIN 14.2  --   --  14.4 14.2   HEMATOCRIT 43.0  --   --  43.3 43.3   PLATELETS 269  --   --  279 308   NEUTROS ABS  --   --   --  6.20 9.03*   IMMATURE GRANS (ABS)  --   --   --  0.04 0.06*   LYMPHS ABS  --   --   --  2.04 1.78   MONOS ABS  --   --   --  0.58 0.61   EOS ABS  --   --   --  0.02 0.04   MCV 80.5  --   --  81.7 80.0   PROTIME  --   --   --   --  14.1   APTT 103.4* 66.8* 49.0* 43.7* 37.5*         Lab 03/16/25  0223 03/15/25  0511 03/14/25 2237 03/14/25 2120   SODIUM 138 136  --  136   POTASSIUM 3.8 3.8  --  3.8   CHLORIDE 105 103  --  104   CO2 22.1 20.7*  --  21.4*   ANION GAP 10.9 12.3  --  10.6   BUN 13 11  --  11   CREATININE 1.26 1.28*  --  1.36*   EGFR 76.8 75.3  --  70.0    GLUCOSE 110* 105*  --  88   CALCIUM 8.8 8.7  --  9.4   MAGNESIUM  --   --   --  1.9   TSH  --   --  5.310*  --          Lab 03/14/25 2120   TOTAL PROTEIN 7.4   ALBUMIN 3.7   GLOBULIN 3.7   ALT (SGPT) 22   AST (SGOT) 22   BILIRUBIN 0.3   ALK PHOS 110   LIPASE 41         Lab 03/15/25  0759 03/15/25  0511 03/14/25 2237 03/14/25 2120   PROBNP  --   --   --  386.3   HSTROP T 101* 117* 138* 137*   PROTIME  --   --   --  14.1   INR  --   --   --  1.05                 Brief Urine Lab Results       None          Microbiology Results (last 10 days)       Procedure Component Value - Date/Time    COVID PRE-OP / PRE-PROCEDURE SCREENING ORDER (NO ISOLATION) - Swab, Nasopharynx [[id number]]  (Normal) Collected: 03/14/25 2122    Lab Status: Final result Specimen: Swab from Nasopharynx Updated: 03/14/25 2208    Narrative:      The following orders were created for panel order COVID PRE-OP / PRE-PROCEDURE SCREENING ORDER (NO ISOLATION) - Swab, Nasopharynx.  Procedure                               Abnormality         Status                     ---------                               -----------         ------                     COVID-19, FLU A/B, RSV P...[[id number]]  Normal              Final result                 Please view results for these tests on the individual orders.    COVID-19, FLU A/B, RSV PCR 1 HR TAT - Swab, Nasopharynx [[id number]]  (Normal) Collected: 03/14/25 2122    Lab Status: Final result Specimen: Swab from Nasopharynx Updated: 03/14/25 2208     COVID19 Not Detected     Influenza A PCR Not Detected     Influenza B PCR Not Detected     RSV, PCR Not Detected    Narrative:      Fact sheet for providers: https://www.fda.gov/media/167153/download    Fact sheet for patients: https://www.fda.gov/media/379713/download    Test performed by PCR.            CT Angiogram Chest Pulmonary Embolism  Result Date: 3/15/2025  Impression: 1.Negative for pulmonary embolus. 2.No acute cardiopulmonary process. Electronically  Signed: Tato Garcia MD  3/15/2025 7:35 AM EDT  Workstation ID: [id number]    XR Chest 1 View  Result Date: 3/14/2025  Mild scarring or atelectasis in the bases. Otherwise no active disease. Electronically Signed: Chinmay Carbajal MD  3/14/2025 9:43 PM EDT  Workstation ID: [id number]               Results for orders placed during the hospital encounter of 03/04/25    Adult Transthoracic Echo Complete w/ Color, Spectral and Contrast if necessary per protocol    Interpretation Summary    Left ventricular systolic function is normal. Left ventricular ejection fraction appears to be 56 - 60%.    Left ventricular wall thickness is consistent with mild concentric hypertrophy.    Left ventricular diastolic function is consistent with (grade I) impaired relaxation.    There are no hemodynamically significant valvular abnormalities.    It is a technically difficult study.  Contrast used for better endocardial definition.      Labs Pending at Discharge:  Pending Labs       Order Current Status    aPTT In process              Time spent on Discharge including face to face service:  35 minutes    Electronically signed by Haroon Ambrosio MD, 03/16/25, 10:32 AM EDT.

## 2025-03-16 NOTE — PROGRESS NOTES
[facility]     Cardiology Progress Note    Patient Name: Anthony Tay  : 1990  MRN: [id number]  Primary Care Physician:  Hillary Worthington APRN  Date of admission: 3/14/2025    Subjective   Subjective     Chief Complaint: The patient is cardiac wise stable and feels much better.    Interval HPI:    Patient ty in normal sinus rhythm.    Review of Systems   All systems were reviewed and negative    Objective   Objective     Vitals:   Temp:  [97.9 °F (36.6 °C)-98.7 °F (37.1 °C)] 98.1 °F (36.7 °C)  Heart Rate:  [86-99] 90  Resp:  [18-20] 18  BP: (142-168)/() 158/106  Physical Exam      Alert and oriented  Heart: regular rate and rhythm  Lungs: Clear to auscultation  Abdomen: Bowel sounds positive  Lower extremity: No edema  Neurologic: No apparent motor deficit    Scheduled Meds:amLODIPine, 10 mg, Oral, Daily  aspirin, 81 mg, Oral, Daily  atorvastatin, 40 mg, Oral, Nightly  carvedilol, 25 mg, Oral, BID With Meals  cloNIDine, 0.2 mg, Oral, BID  FLUoxetine, 20 mg, Oral, Daily  FLUoxetine, 40 mg, Oral, QAM  gabapentin, 800 mg, Oral, Daily  levothyroxine, 50 mcg, Oral, Q AM  losartan, 100 mg, Oral, Daily  paliperidone, 9 mg, Oral, QAM  prasugrel, 10 mg, Oral, Daily  sodium chloride, 10 mL, Intravenous, Q12H  spironolactone, 25 mg, Oral, Daily      Continuous Infusions:heparin, 7.22 Units/kg/hr, Last Rate: 13.2 Units/kg/hr (25 0634)           Result Review    Result Review:  I have personally reviewed the results from the time of this admission to 3/16/2025 09:33 EDT and agree with these findings:  [x]  Laboratory  []  Microbiology  [x]  Radiology  [x]  EKG/Telemetry   [x]  Cardiology/Vascular   []  Pathology  []  Old records  []  Other:  Most notable findings include:     CBC          3/14/2025    21:20 3/15/2025    05:11 3/16/2025    02:23   CBC   WBC 11.53  8.90  10.60    RBC 5.41  5.30  5.34    Hemoglobin 14.2  14.4  14.2    Hematocrit 43.3  43.3  43.0    MCV 80.0  81.7  80.5    MCH 26.2   27.2  26.6    MCHC 32.8  33.3  33.0    RDW 14.0  13.6  13.8    Platelets 308  279  269      CMP          3/14/2025    21:20 3/15/2025    05:11 3/16/2025    02:23   CMP   Glucose 88  105  110    BUN 11  11  13    Creatinine 1.36  1.28  1.26    EGFR 70.0  75.3  76.8    Sodium 136  136  138    Potassium 3.8  3.8  3.8    Chloride 104  103  105    Calcium 9.4  8.7  8.8    Total Protein 7.4      Albumin 3.7      Globulin 3.7      Total Bilirubin 0.3      Alkaline Phosphatase 110      AST (SGOT) 22      ALT (SGPT) 22      Albumin/Globulin Ratio 1.0      BUN/Creatinine Ratio 8.1  8.6  10.3    Anion Gap 10.6  12.3  10.9       CARDIAC LABS:      Lab 03/15/25  0759 03/15/25  0511 03/14/25  2237 03/14/25  2120   PROBNP  --   --   --  386.3   HSTROP T 101* 117* 138* 137*   PROTIME  --   --   --  14.1   INR  --   --   --  1.05        Assessment & Plan   Assessment / Plan     Brief Patient Summary:  Anthony Tay is a 34 y.o. male with recent ST elevation inferior wall myocardial infarction treated with PCI and drug-eluting stent.  He had dyspnea only with exertion on ticagrelor and now he switched to Effient.      Active Hospital Problems:  Active Hospital Problems    Diagnosis     **Shortness of breath at rest     History of ST elevation myocardial infarction (STEMI)            Plan:     I will continue with optimal guideline directed medical therapy including dual antiplatelet therapy with aspirin 81 mg oral daily and Effient 10 mg oral daily.  Continue with Aldactone beta-blockers and statin therapy.  Continue with angiotensin receptor blockers and blood pressure control.  No objection for discharge home today with follow-up with Dr. Bailey within 2 weeks.       CODE STATUS:   Code Status (Patient has no pulse and is not breathing): CPR (Attempt to Resuscitate)  Medical Interventions (Patient has pulse or is breathing): Full Support  Level Of Support Discussed With: Patient      Electronically signed by Felipe Sampson  MD, 03/16/25, 9:33 AM EDT.

## 2025-03-16 NOTE — NURSING NOTE
Went over discharge instructions with pt, verbalized understanding, instructed to  meds from pharmacy, removed tele and sent to monitor room, removed IV tip intact no redness or swelling noted, returned personal belongings,taken to private vehicle.

## 2025-03-17 ENCOUNTER — TRANSITIONAL CARE MANAGEMENT TELEPHONE ENCOUNTER (OUTPATIENT)
Dept: CALL CENTER | Facility: HOSPITAL | Age: 35
End: 2025-03-17
Payer: COMMERCIAL

## 2025-03-17 NOTE — OUTREACH NOTE
Call Center TCM Note      Flowsheet Row Responses   [facility] patient discharged from? [facility]   Does the patient have one of the following disease processes/diagnoses(primary or secondary)? Other   TCM attempt successful? Yes  [VR for siblings, Yves and Toya]   Call start time 1428   Call end time 1431   Discharge diagnosis Shortness of breath at rest   Medication alerts for this patient Brilinta stopped and started on Effient   Meds reviewed with patient/caregiver? Yes   Is the patient having any side effects they believe may be caused by any medication additions or changes? No   Does the patient have all medications ordered at discharge? Yes   Is the patient taking all medications as directed (includes completed medication regime)? Yes   Comments Hospital f/u 3/20/25@0800am   Does the patient have an appointment with their PCP within 7-14 days of discharge? Yes   Has home health visited the patient within 72 hours of discharge? N/A   Psychosocial issues? No   Did the patient receive a copy of their discharge instructions? Yes   Nursing interventions Reviewed instructions with patient   What is the patient's perception of their health status since discharge? Improving  [pt is doing better since discharge, denies any ongoing issues with SOA, no chest pain.  Aware to seek care for any cardiac type s/s.]   Is the patient/caregiver able to teach back signs and symptoms related to disease process for when to call PCP? Yes   Is the patient/caregiver able to teach back signs and symptoms related to disease process for when to call 911? Yes   TCM call completed? Yes   Call end time 1431            Madison Villanueva RN    3/17/2025, 14:32 EDT

## 2025-03-19 LAB
QT INTERVAL: 353 MS
QTC INTERVAL: 431 MS

## 2025-03-20 ENCOUNTER — PATIENT MESSAGE (OUTPATIENT)
Dept: FAMILY MEDICINE CLINIC | Facility: CLINIC | Age: 35
End: 2025-03-20

## 2025-03-20 ENCOUNTER — OFFICE VISIT (OUTPATIENT)
Dept: FAMILY MEDICINE CLINIC | Facility: CLINIC | Age: 35
End: 2025-03-20
Payer: COMMERCIAL

## 2025-03-20 VITALS
SYSTOLIC BLOOD PRESSURE: 138 MMHG | TEMPERATURE: 97.3 F | WEIGHT: 315 LBS | HEART RATE: 103 BPM | HEIGHT: 77 IN | BODY MASS INDEX: 37.19 KG/M2 | DIASTOLIC BLOOD PRESSURE: 88 MMHG | OXYGEN SATURATION: 95 %

## 2025-03-20 DIAGNOSIS — E03.9 HYPOTHYROIDISM, UNSPECIFIED TYPE: ICD-10-CM

## 2025-03-20 DIAGNOSIS — I25.2 HISTORY OF ST ELEVATION MYOCARDIAL INFARCTION (STEMI): ICD-10-CM

## 2025-03-20 DIAGNOSIS — F17.218 CIGARETTE NICOTINE DEPENDENCE WITH OTHER NICOTINE-INDUCED DISORDER: ICD-10-CM

## 2025-03-20 DIAGNOSIS — I10 ESSENTIAL HYPERTENSION: ICD-10-CM

## 2025-03-20 DIAGNOSIS — Z23 IMMUNIZATION DUE: ICD-10-CM

## 2025-03-20 DIAGNOSIS — Z09 HOSPITAL DISCHARGE FOLLOW-UP: Primary | ICD-10-CM

## 2025-03-20 DIAGNOSIS — R06.83 SNORING: Primary | ICD-10-CM

## 2025-03-20 RX ORDER — VARENICLINE TARTRATE 0.5 (11)-1
KIT ORAL
Qty: 53 EACH | Refills: 0 | Status: SHIPPED | OUTPATIENT
Start: 2025-03-20 | End: 2025-04-17

## 2025-03-20 RX ORDER — VARENICLINE TARTRATE 1 MG/1
1 TABLET, FILM COATED ORAL 2 TIMES DAILY
Qty: 180 TABLET | Refills: 0 | Status: SHIPPED | OUTPATIENT
Start: 2025-03-20

## 2025-03-20 NOTE — PROGRESS NOTES
Transitional Care Follow Up Visit  Subjective     Anthony is a 34 y.o. male who presents for a transitional care management visit.    Within 48 business hours after discharge our office contacted him via telephone to coordinate his care and needs.      I reviewed and discussed the details of that call along with the discharge summary, hospital problems, inpatient lab results, inpatient diagnostic studies, and consultation reports with Anthony.     Current outpatient and discharge medications have been reconciled for the patient.  Reviewed by: RHODA Salamanca          3/16/2025    12:16 PM   Date of TCM Phone Call   [facility]   Date of Admission 3/14/2025   Date of Discharge 3/16/2025   Discharge Disposition Home or Self Care       Risk for Readmission (LACE) Score: 4 (3/16/2025  6:00 AM)      History of Present Illness     Course During Hospital Stay The following information was reviewed by: RHODA Lunsford on 03/20/2025:     History of Present Illness  The patient is a 34-year-old male here for a hospital discharge follow-up.    He was initially hospitalized due to a myocardial infarction, which was managed with the placement of a stent in the right coronary artery. He was started on Brilinta, but it did not work. He took it until he went back to the ER on 03/14/2025. His current medication regimen includes Effient, Inveda, Aldactone, losartan, clonidine, carvedilol, atorvastatin, aspirin, and amlodipine. He reports an improvement in his condition since discharge. He has lost approximately 23 to 26 pounds since June 2024, attributing this weight loss to dietary modifications, including the elimination of soda and increased consumption of salads, as well as regular physical activity. He has been monitoring his blood pressure at home, which has shown significant improvement compared to his hospital admission readings. He reports no chest pain or dyspnea but notes mild fatigue  "during exercise, which he was informed is a normal response. He is currently enrolled in a cardiac rehabilitation program.    He is under the care of Melly Giang for anxiety and depression, managed with Prozac 60 mg, gabapentin, and hydroxyzine. He reports that his depression is stable with the current medication regimen.    He is also on levothyroxine for thyroid management.    He has expressed interest in smoking cessation strategies, having previously attempted to quit without success. He has abstained from smoking since his recent hospitalization.    SOCIAL HISTORY  The patient has quit drinking soda and is eating healthier, including more salads. The patient is trying to quit smoking and has not had a cigarette since the heart attack.    MEDICATIONS  Current: Effient, Inveda, Aldactone, losartan, clonidine, carvedilol, atorvastatin, aspirin, amlodipine, Prozac, gabapentin, hydroxyzine, levothyroxine       The following portions of the patient's history were reviewed and updated as appropriate: allergies, current medications, past family history, past medical history, past social history, past surgical history, and problem list.     Vitals:    03/20/25 0802   BP: 138/88   BP Location: Left arm   Patient Position: Sitting   Pulse: 103   Temp: 97.3 °F (36.3 °C)   SpO2: 95%   Weight: (!) 162 kg (356 lb 12.8 oz)   Height: 195.6 cm (77\")   PainSc: 0-No pain       Review of Systems    Objective   Physical Exam  Vitals reviewed.   Constitutional:       General: He is not in acute distress.     Appearance: Normal appearance. He is well-developed.   HENT:      Head: Normocephalic and atraumatic.      Right Ear: External ear normal.      Left Ear: External ear normal.   Eyes:      Conjunctiva/sclera: Conjunctivae normal.      Pupils: Pupils are equal, round, and reactive to light.   Cardiovascular:      Rate and Rhythm: Normal rate and regular rhythm.      Heart sounds: Normal heart sounds.   Pulmonary:      Effort: " Pulmonary effort is normal.      Breath sounds: Normal breath sounds.   Musculoskeletal:      Cervical back: Neck supple.      Right lower leg: No edema.      Left lower leg: No edema.   Skin:     General: Skin is warm and dry.   Neurological:      Mental Status: He is alert and oriented to person, place, and time.   Psychiatric:         Mood and Affect: Mood and affect normal.         Behavior: Behavior normal.         Thought Content: Thought content normal.         Judgment: Judgment normal.         Physical Exam  Vital Signs  Blood pressure readings are 117/74, 114/73, 115/74, 118/73, 116/73, and on 03/19/2025 it was 117/73 and 114.    Common labs          3/14/2025    21:20 3/15/2025    05:11 3/16/2025    02:23   Common Labs   Glucose 88  105  110    BUN 11  11  13    Creatinine 1.36  1.28  1.26    Sodium 136  136  138    Potassium 3.8  3.8  3.8    Chloride 104  103  105    Calcium 9.4  8.7  8.8    Albumin 3.7      Total Bilirubin 0.3      Alkaline Phosphatase 110      AST (SGOT) 22      ALT (SGPT) 22      WBC 11.53  8.90  10.60    Hemoglobin 14.2  14.4  14.2    Hematocrit 43.3  43.3  43.0    Platelets 308  279  269      TSH          3/5/2025    02:31 3/14/2025    22:37   TSH   TSH 0.969  5.310      CT Angiogram Chest Pulmonary Embolism  Result Date: 3/15/2025  Impression: 1.Negative for pulmonary embolus. 2.No acute cardiopulmonary process. Electronically Signed: Tato Garcia MD  3/15/2025 7:35 AM EDT  Workstation ID: [id number]    XR Chest 1 View  Result Date: 3/14/2025  Mild scarring or atelectasis in the bases. Otherwise no active disease. Electronically Signed: Chinmay Carbajal MD  3/14/2025 9:43 PM EDT  Workstation ID: [id number]    XR Chest 1 View  Result Date: 3/4/2025  Impression: No acute cardiopulmonary abnormality is identified. Electronically Signed: Maribeth Campos  3/4/2025 1:15 PM EST  Workstation ID: [id number]    Discharge Summary by Haroon Ambrosio MD (03/16/2025 10:31)   Discharge Summary by  Misha Bailey MD (03/06/2025 08:28)     Result Review :  The following data was reviewed by: RHODA Lunsford on 03/20/2025:    Assessment & Plan     Diagnoses and all orders for this visit:    1. Hospital discharge follow-up (Primary)    2. History of ST elevation myocardial infarction (STEMI)    3. Essential hypertension    4. Hypothyroidism, unspecified type    5. Cigarette nicotine dependence with other nicotine-induced disorder  -     Varenicline Tartrate, Starter, 0.5 MG X 11 & 1 MG X 42 tablet therapy pack; Take 0.5 mg by mouth Daily for 3 days, THEN 0.5 mg 2 (Two) Times a Day for 4 days, THEN 1 mg 2 (Two) Times a Day for 21 days.  Dispense: 53 each; Refill: 0  -     varenicline (Chantix) 1 MG tablet; Take 1 tablet by mouth 2 (Two) Times a Day.  Dispense: 180 tablet; Refill: 0    6. Immunization due  -     COVID-19 (Pfizer) 12yrs+ (COMIRNATY)      Diagnoses and all orders for this visit:    1. Hospital discharge follow-up (Primary)    2. History of ST elevation myocardial infarction (STEMI)    3. Essential hypertension    4. Hypothyroidism, unspecified type    5. Cigarette nicotine dependence with other nicotine-induced disorder  -     Varenicline Tartrate, Starter, 0.5 MG X 11 & 1 MG X 42 tablet therapy pack; Take 0.5 mg by mouth Daily for 3 days, THEN 0.5 mg 2 (Two) Times a Day for 4 days, THEN 1 mg 2 (Two) Times a Day for 21 days.  Dispense: 53 each; Refill: 0  -     varenicline (Chantix) 1 MG tablet; Take 1 tablet by mouth 2 (Two) Times a Day.  Dispense: 180 tablet; Refill: 0    6. Immunization due  -     COVID-19 (Pfizer) 12yrs+ (COMIRNATY)        Assessment & Plan  1. Post-myocardial infarction status.  He was hospitalized for a heart attack and had a stent placed in the right coronary artery. He is currently on Effient, Inveda, Aldactone, losartan, clonidine, carvedilol, atorvastatin, aspirin, and amlodipine. His blood pressure has improved significantly since hospitalization. He is advised  to continue his current medications and follow up with cardiology on 03/28/2025.    2. Smoking cessation.  He has quit smoking but is experiencing cravings. Chantix will be prescribed to help manage these cravings. The dosage will start at 0.5 mg once daily for 3 days, then 0.5 mg twice daily for 4 days, and finally 1 mg twice daily. He is advised to take the medication in the morning and mid to late afternoon to avoid vivid dreams. The prescription will be sent to [facility]. He should continue the medication for at least 3 months.    3. Depression.  He is currently stable on Prozac 60 mg, gabapentin, and hydroxyzine. He reports that his depression is well-managed with these medications. No changes to his current regimen are necessary at this time.    4. Thyroid management.  His thyroid levels were slightly elevated on 03/14/2025 but normalized two weeks ago. He will continue his current dose of levothyroxine. A recheck of his thyroid levels will be done in one month.    Follow-up  The patient will follow up in one month.    PROCEDURE  The patient underwent stent placement in the right coronary artery during hospitalization for a myocardial infarction.      Follow Up     Return in about 4 weeks (around 4/17/2025) for Recheck.           Patient or patient representative verbalized consent for the use of Ambient Listening during the visit with  RHODA Lunsford for chart documentation. 3/20/2025  08:08 EDT

## 2025-03-28 ENCOUNTER — OFFICE VISIT (OUTPATIENT)
Dept: CARDIOLOGY | Facility: CLINIC | Age: 35
End: 2025-03-28
Payer: COMMERCIAL

## 2025-03-28 VITALS
WEIGHT: 315 LBS | HEIGHT: 77 IN | DIASTOLIC BLOOD PRESSURE: 65 MMHG | HEART RATE: 59 BPM | BODY MASS INDEX: 37.19 KG/M2 | SYSTOLIC BLOOD PRESSURE: 131 MMHG

## 2025-03-28 DIAGNOSIS — Z95.5 S/P CORONARY ARTERY STENT PLACEMENT: ICD-10-CM

## 2025-03-28 DIAGNOSIS — I10 ESSENTIAL HYPERTENSION: ICD-10-CM

## 2025-03-28 DIAGNOSIS — I25.10 CORONARY ARTERY DISEASE INVOLVING NATIVE CORONARY ARTERY OF NATIVE HEART WITHOUT ANGINA PECTORIS: Primary | ICD-10-CM

## 2025-03-28 DIAGNOSIS — E78.5 HYPERLIPIDEMIA LDL GOAL <70: ICD-10-CM

## 2025-03-28 DIAGNOSIS — I49.3 FREQUENT PVCS: ICD-10-CM

## 2025-03-28 DIAGNOSIS — I25.2 HISTORY OF ST ELEVATION MYOCARDIAL INFARCTION (STEMI): ICD-10-CM

## 2025-03-28 RX ORDER — FLECAINIDE ACETATE 50 MG/1
50 TABLET ORAL
COMMUNITY
Start: 2025-03-21

## 2025-03-28 NOTE — ASSESSMENT & PLAN NOTE
he is stable without any further anginal symptoms.  He will need to continue DAPT therapy for minimum of 1 year, with aspirin and Effient.  Continue beta-blockers and statins.  He will be enrolled in cardiac rehab.

## 2025-03-28 NOTE — ASSESSMENT & PLAN NOTE
He had holter study in 2021, showing 14 % PVC burden, and has been on flecainide since.  Does well, no symptoms of palpitations, continue the same.

## 2025-03-28 NOTE — PROGRESS NOTES
Chief Complaint  Follow-up    Subjective        History of Present Illness  Anthony Tay presents to [facility] CARDIOLOGY   Mr. Tay is a 34-year-old male patient coming in today for cardiac follow-up.  Previous medical history includes hypertension, psychiatric disorders including Asperger's and Tourette's, and previous workup included significant PVC burden of 14%, hence he is on flecainide.  On March 4 he was hospitalized due to chest pains, and had acute inferior STEMI, requiring angioplasty and stent placement to the distal RCA with YASMANY.  He ended up presenting back to the hospital on March 14 primarily with complaints of shortness of breath, at that time antiplatelet regiment was switched to Effient, and he has done well with this.  Denies any further episodes of shortness of breath or chest pain.  Blood pressures antihypertensives recently adjusted, and blood pressures are stable.  He is willing to go to cardiac rehab, but has not been contacted yet.  He has stopped smoking since being in the hospital.      Past Medical History:   Diagnosis Date    Allergic rhinitis     Anxiety     Asperger's syndrome     Colon polyps 07/18/2014    Depression     GERD (gastroesophageal reflux disease) 01/23/2015    Hematuria, microscopic 03/25/2014    Hypertension     Microscopic hematuria 03/25/2014    Resistant hypertension 02/03/2020    S/P right coronary artery (RCA) stent placement     STEMI (ST elevation myocardial infarction)     Tourette's     Tourette's disorder 03/06/2014       Allergies   Allergen Reactions    Shellfish Allergy Swelling    Morphine Other (See Comments)     unk        Past Surgical History:   Procedure Laterality Date    CARDIAC CATHETERIZATION N/A 3/4/2025    Procedure: Left Heart Cath;  Surgeon: Misha Bailey MD;  Location: [facility] CATH INVASIVE LOCATION;  Service: Cardiovascular;  Laterality: N/A;    COLONOSCOPY  07/18/2014    CYSTOSCOPY  04/2014    WNL     POLYPECTOMY  07/18/2014        Social History  He  reports that he quit smoking about 2 months ago. His smoking use included cigarettes. He started smoking about 16 years ago. He has a 0.8 pack-year smoking history. He has been exposed to tobacco smoke. He has never used smokeless tobacco. He reports that he does not currently use drugs. He reports that he does not drink alcohol.    Family History  His family history includes Diabetes type II in an other family member; Heart attack in his father and sister; Mental illness in his mother; Stroke in an other family member.       Current Outpatient Medications on File Prior to Visit   Medication Sig    amLODIPine (NORVASC) 10 MG tablet Take 1 tablet by mouth Daily.    aspirin 81 MG EC tablet Take 1 tablet by mouth Daily.    atorvastatin (LIPITOR) 40 MG tablet Take 1 tablet by mouth Every Night.    Blood Pressure Monitoring (Advanced One Step BP Monitor) misc Use 1 each Daily.    carvedilol (COREG) 25 MG tablet Take 1 tablet by mouth 2 (Two) Times a Day With Meals.    cloNIDine (CATAPRES) 0.2 MG tablet Take 1 tablet by mouth 2 (Two) Times a Day.    flecainide (TAMBOCOR) 50 MG tablet Take 1 tablet by mouth.    FLUoxetine (PROzac) 20 MG capsule Take 1 capsule by mouth Daily for 90 days. Take with 40mg for total dose of 60mg.    FLUoxetine (PROzac) 40 MG capsule Take 1 capsule by mouth Every Morning for 90 days. Take with 20mg cap for total dose of 60mg    gabapentin (Neurontin) 800 MG tablet Take 1 tablet by mouth Every Night for 90 days.    hydrOXYzine (ATARAX) 25 MG tablet TAKE 1 TO 2 TABLETS BY MOUTH ONCE DAILY AS NEEDED SEVERE  ANXIETY (Patient taking differently: Take 1 tablet by mouth 2 (Two) Times a Day As Needed. TAKE 1 TO 2 TABLETS BY MOUTH ONCE DAILY AS NEEDED SEVERE  ANXIETY)    levothyroxine (SYNTHROID, LEVOTHROID) 50 MCG tablet Take 1 tablet by mouth Daily.    losartan (COZAAR) 100 MG tablet Take 1 tablet by mouth Daily.    paliperidone (INVEGA) 9 MG 24 hr  "tablet Take 1 tablet by mouth Every Morning.    prasugrel (EFFIENT) 10 MG tablet Take 1 tablet by mouth Daily.    spironolactone (ALDACTONE) 25 MG tablet Take 1 tablet by mouth Daily.    varenicline (Chantix) 1 MG tablet Take 1 tablet by mouth 2 (Two) Times a Day.    Varenicline Tartrate, Starter, 0.5 MG X 11 & 1 MG X 42 tablet therapy pack Take 0.5 mg by mouth Daily for 3 days, THEN 0.5 mg 2 (Two) Times a Day for 4 days, THEN 1 mg 2 (Two) Times a Day for 21 days.     No current facility-administered medications on file prior to visit.         Review of Systems   Constitutional:  Negative for fatigue.   Respiratory:  Negative for cough, chest tightness and shortness of breath.    Cardiovascular:  Negative for chest pain, palpitations and leg swelling.   Gastrointestinal:  Negative for nausea and vomiting.   Neurological:  Negative for dizziness and syncope.        Objective   Vitals:    03/28/25 1346   BP: 131/65   Pulse: 59   Weight: (!) 161 kg (355 lb)   Height: 195.6 cm (77\")         Physical Exam  General : Alert, awake, no acute distress, obese  Neck : Supple, no carotid bruit, no jugular venous distention  CVS : Regular rate and rhythm, no murmur, no rubs or gallops  Lungs: Clear to auscultation bilaterally, no crackles or rhonchi  Abdomen: Soft, nontender, bowel sounds active  Extremities: Warm, well-perfused, no pedal edema      Result Review     The following data was reviewed by RHODA Cha  proBNP   Date Value Ref Range Status   03/14/2025 386.3 0.0 - 450.0 pg/mL Final     CMP          3/14/2025    21:20 3/15/2025    05:11 3/16/2025    02:23   CMP   Glucose 88  105  110    BUN 11  11  13    Creatinine 1.36  1.28  1.26    EGFR 70.0  75.3  76.8    Sodium 136  136  138    Potassium 3.8  3.8  3.8    Chloride 104  103  105    Calcium 9.4  8.7  8.8    Total Protein 7.4      Albumin 3.7      Globulin 3.7      Total Bilirubin 0.3      Alkaline Phosphatase 110      AST (SGOT) 22      ALT (SGPT) 22    " "  Albumin/Globulin Ratio 1.0      BUN/Creatinine Ratio 8.1  8.6  10.3    Anion Gap 10.6  12.3  10.9      CBC w/diff          3/14/2025    21:20 3/15/2025    05:11 3/16/2025    02:23   CBC w/Diff   WBC 11.53  8.90  10.60    RBC 5.41  5.30  5.34    Hemoglobin 14.2  14.4  14.2    Hematocrit 43.3  43.3  43.0    MCV 80.0  81.7  80.5    MCH 26.2  27.2  26.6    MCHC 32.8  33.3  33.0    RDW 14.0  13.6  13.8    Platelets 308  279  269    Neutrophil Rel % 78.4  69.8     Immature Granulocyte Rel % 0.5  0.4     Lymphocyte Rel % 15.4  22.9     Monocyte Rel % 5.3  6.5     Eosinophil Rel % 0.3  0.2     Basophil Rel % 0.1  0.2        Lab Results   Component Value Date    TSH 5.310 (H) 03/14/2025      Lab Results   Component Value Date    FREET4 1.43 03/14/2025      No results found for: \"DDIMERQUANT\"  Magnesium   Date Value Ref Range Status   03/14/2025 1.9 1.6 - 2.6 mg/dL Final      No results found for: \"DIGOXIN\"   Lab Results   Component Value Date    TROPONINT 101 (C) 03/15/2025           Lipid Panel          3/5/2025    02:31   Lipid Panel   Total Cholesterol 152    Triglycerides 93    HDL Cholesterol 34    VLDL Cholesterol 18    LDL Cholesterol  100    LDL/HDL Ratio 2.92          Results for orders placed during the hospital encounter of 03/04/25    Adult Transthoracic Echo Complete w/ Color, Spectral and Contrast if necessary per protocol    Interpretation Summary    Left ventricular systolic function is normal. Left ventricular ejection fraction appears to be 56 - 60%.    Left ventricular wall thickness is consistent with mild concentric hypertrophy.    Left ventricular diastolic function is consistent with (grade I) impaired relaxation.    There are no hemodynamically significant valvular abnormalities.    It is a technically difficult study.  Contrast used for better endocardial definition.    Holter  5/13/2021         Assessment and Plan   Diagnoses and all orders for this visit:    1. Coronary artery disease involving " native coronary artery of native heart without angina pectoris (Primary)  Assessment & Plan:   he is stable without any further anginal symptoms.  He will need to continue DAPT therapy for minimum of 1 year, with aspirin and Effient.  Continue beta-blockers and statins.  He will be enrolled in cardiac rehab.      2. History of ST elevation myocardial infarction (STEMI)  Assessment & Plan:  He has referral to cardiac rehab.      3. S/P coronary artery stent placement    4. Essential hypertension  Assessment & Plan:  Blood pressures currently well-controlled, continue current regiment with spironolactone, losartan, carvedilol, amlodipine and clonidine.    Orders:  -     Comprehensive Metabolic Panel; Future    5. Hyperlipidemia LDL goal <70  Assessment & Plan:   LDL not at goal at 100, started on atorvastatin 40 mg during recent hospitalization, we will recheck fasting lipid profile for next routine follow-up visit.    Orders:  -     Comprehensive Metabolic Panel; Future  -     Lipid Panel; Future    6. Frequent PVCs  Assessment & Plan:  He had holter study in 2021, showing 14 % PVC burden, and has been on flecainide since.  Does well, no symptoms of palpitations, continue the same.              Follow Up   Return in about 3 months (around 6/28/2025) for with Dr. Bailey.    Patient was given instructions and counseling regarding his condition or for health maintenance advice. Please see specific information pulled into the AVS if appropriate.     Signed,  Cee Reno, APRN  03/28/2025     Dictated Utilizing Dragon Dictation: Please note that portions of this note were completed with a voice recognition program.  Part of this note may be an electronic transcription/translation of spoken language to printed text using the Dragon Dictation System.

## 2025-03-28 NOTE — ASSESSMENT & PLAN NOTE
Blood pressures currently well-controlled, continue current regiment with spironolactone, losartan, carvedilol, amlodipine and clonidine.

## 2025-03-28 NOTE — ASSESSMENT & PLAN NOTE
LDL not at goal at 100, started on atorvastatin 40 mg during recent hospitalization, we will recheck fasting lipid profile for next routine follow-up visit.

## 2025-04-07 RX ORDER — AMLODIPINE BESYLATE 10 MG/1
10 TABLET ORAL DAILY
Qty: 90 TABLET | Refills: 0 | Status: SHIPPED | OUTPATIENT
Start: 2025-04-07

## 2025-04-07 RX ORDER — PRASUGREL 10 MG/1
10 TABLET, FILM COATED ORAL DAILY
Qty: 30 TABLET | Refills: 11 | Status: SHIPPED | OUTPATIENT
Start: 2025-04-07

## 2025-04-09 ENCOUNTER — TELEPHONE (OUTPATIENT)
Dept: PSYCHIATRY | Facility: CLINIC | Age: 35
End: 2025-04-09
Payer: COMMERCIAL

## 2025-04-09 NOTE — TELEPHONE ENCOUNTER
PT LVM REQUESTING MEDICATION REFILLS.    I CALLED PT BACK TO INQUIRE ABOUT WHAT MEDICATIONS.    I SPOKE TO PT.    PT STATED HE NEEDED REFILLS ON ALL MEDS.    I INFORMED PT THAT HE SHOULD HAVE A REFILL REMAINING FOR ANOTHER 30 DAYS AT HIS PHARMACY OF HIS MEDICATIONS.    90 DAYS WORTH OF MEDICATIONS WERE SENT INTO THE PHARMACY ON 02/10/2025.    PT EXPRESSED UNDERSTANDING.

## 2025-04-14 RX ORDER — SPIRONOLACTONE 25 MG/1
25 TABLET ORAL DAILY
Qty: 90 TABLET | Refills: 3 | Status: SHIPPED | OUTPATIENT
Start: 2025-04-14 | End: 2025-04-17 | Stop reason: SDUPTHER

## 2025-04-14 NOTE — TELEPHONE ENCOUNTER
Rx Refill Note  Requested Prescriptions     Pending Prescriptions Disp Refills    spironolactone (ALDACTONE) 25 MG tablet 90 tablet 0     Sig: Take 1 tablet by mouth Daily.        LAST OFFICE VISIT:  3/28/2025     NEXT OFFICE VISIT:  08/25/2025     Does the medication requests match the last office note:    [x] Yes   [] No    Does this refill request meet protocol details for MA to approve:     [x] Yes   [] No   [] No Protocols Provided

## 2025-04-15 ENCOUNTER — TELEMEDICINE (OUTPATIENT)
Dept: BEHAVIORAL HEALTH | Facility: CLINIC | Age: 35
End: 2025-04-15
Payer: COMMERCIAL

## 2025-04-15 ENCOUNTER — TELEPHONE (OUTPATIENT)
Dept: PSYCHIATRY | Facility: CLINIC | Age: 35
End: 2025-04-15

## 2025-04-15 DIAGNOSIS — F41.1 GENERALIZED ANXIETY DISORDER: ICD-10-CM

## 2025-04-15 DIAGNOSIS — F51.5 NIGHTMARES: ICD-10-CM

## 2025-04-15 DIAGNOSIS — F99 INSOMNIA DUE TO OTHER MENTAL DISORDER: ICD-10-CM

## 2025-04-15 DIAGNOSIS — F51.05 INSOMNIA DUE TO OTHER MENTAL DISORDER: ICD-10-CM

## 2025-04-15 DIAGNOSIS — F20.0 SCHIZOPHRENIA, PARANOID TYPE: Primary | ICD-10-CM

## 2025-04-15 RX ORDER — HYDROXYZINE HYDROCHLORIDE 25 MG/1
TABLET, FILM COATED ORAL
Qty: 180 TABLET | Refills: 0 | Status: SHIPPED | OUTPATIENT
Start: 2025-04-15

## 2025-04-15 RX ORDER — GABAPENTIN 800 MG/1
800 TABLET ORAL NIGHTLY
Qty: 90 TABLET | Refills: 0 | Status: SHIPPED | OUTPATIENT
Start: 2025-04-15 | End: 2025-07-14

## 2025-04-15 RX ORDER — FLUOXETINE HYDROCHLORIDE 40 MG/1
40 CAPSULE ORAL EVERY MORNING
Qty: 30 CAPSULE | Refills: 0 | Status: SHIPPED | OUTPATIENT
Start: 2025-04-15

## 2025-04-15 RX ORDER — PALIPERIDONE 6 MG/1
6 TABLET, EXTENDED RELEASE ORAL EVERY MORNING
Qty: 30 TABLET | Refills: 0 | Status: SHIPPED | OUTPATIENT
Start: 2025-04-15

## 2025-04-15 NOTE — TELEPHONE ENCOUNTER
PA FOR PTS INVEGA HAS BEEN INITIATED AND PROCESSED THRU [facility].    KEY IS:  [id number]    AWAITING RESPONSE FROM INSURANCE.
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
## 2025-04-17 ENCOUNTER — TELEPHONE (OUTPATIENT)
Dept: CARDIOLOGY | Facility: CLINIC | Age: 35
End: 2025-04-17
Payer: COMMERCIAL

## 2025-04-17 RX ORDER — SPIRONOLACTONE 25 MG/1
25 TABLET ORAL DAILY
Qty: 90 TABLET | Refills: 3 | Status: SHIPPED | OUTPATIENT
Start: 2025-04-17

## 2025-04-17 NOTE — TELEPHONE ENCOUNTER
PT REQUESTING REFILL THAT WAS ALREADY SENT ON 04/14; HOWEVER, IT WAS SENT TO THE INCORRECT PHARMACY.  RESENDING.

## 2025-05-02 ENCOUNTER — TELEPHONE (OUTPATIENT)
Dept: PSYCHIATRY | Facility: CLINIC | Age: 35
End: 2025-05-02
Payer: COMMERCIAL

## 2025-05-02 ENCOUNTER — OFFICE VISIT (OUTPATIENT)
Dept: FAMILY MEDICINE CLINIC | Facility: CLINIC | Age: 35
End: 2025-05-02
Payer: COMMERCIAL

## 2025-05-02 VITALS
DIASTOLIC BLOOD PRESSURE: 90 MMHG | TEMPERATURE: 97.4 F | BODY MASS INDEX: 37.19 KG/M2 | HEIGHT: 77 IN | WEIGHT: 315 LBS | SYSTOLIC BLOOD PRESSURE: 134 MMHG | HEART RATE: 80 BPM | RESPIRATION RATE: 18 BRPM | OXYGEN SATURATION: 98 %

## 2025-05-02 DIAGNOSIS — I25.10 CORONARY ARTERY DISEASE INVOLVING NATIVE CORONARY ARTERY OF NATIVE HEART WITHOUT ANGINA PECTORIS: ICD-10-CM

## 2025-05-02 DIAGNOSIS — E66.813 CLASS 3 SEVERE OBESITY WITH SERIOUS COMORBIDITY AND BODY MASS INDEX (BMI) OF 40.0 TO 44.9 IN ADULT, UNSPECIFIED OBESITY TYPE: ICD-10-CM

## 2025-05-02 DIAGNOSIS — E66.01 CLASS 3 SEVERE OBESITY WITH SERIOUS COMORBIDITY AND BODY MASS INDEX (BMI) OF 40.0 TO 44.9 IN ADULT, UNSPECIFIED OBESITY TYPE: ICD-10-CM

## 2025-05-02 DIAGNOSIS — I10 ESSENTIAL HYPERTENSION: ICD-10-CM

## 2025-05-02 DIAGNOSIS — K21.9 GASTROESOPHAGEAL REFLUX DISEASE, UNSPECIFIED WHETHER ESOPHAGITIS PRESENT: ICD-10-CM

## 2025-05-02 DIAGNOSIS — I25.2 HISTORY OF ST ELEVATION MYOCARDIAL INFARCTION (STEMI): Primary | ICD-10-CM

## 2025-05-02 PROCEDURE — 3075F SYST BP GE 130 - 139MM HG: CPT | Performed by: NURSE PRACTITIONER

## 2025-05-02 PROCEDURE — 1160F RVW MEDS BY RX/DR IN RCRD: CPT | Performed by: NURSE PRACTITIONER

## 2025-05-02 PROCEDURE — 1126F AMNT PAIN NOTED NONE PRSNT: CPT | Performed by: NURSE PRACTITIONER

## 2025-05-02 PROCEDURE — 3080F DIAST BP >= 90 MM HG: CPT | Performed by: NURSE PRACTITIONER

## 2025-05-02 PROCEDURE — 1159F MED LIST DOCD IN RCRD: CPT | Performed by: NURSE PRACTITIONER

## 2025-05-02 PROCEDURE — 99214 OFFICE O/P EST MOD 30 MIN: CPT | Performed by: NURSE PRACTITIONER

## 2025-05-02 NOTE — TELEPHONE ENCOUNTER
PA FOR PTS PALIPERIDONE ER 6MG TABLETS HAS BEEN INITIATED AND PROCESSED THRU [facility].    KEY IS:  [id number]    AWAITING RESPONSE FROM INSURANCE.

## 2025-05-02 NOTE — TELEPHONE ENCOUNTER
PA APPROVAL RECEIVED FROM INSURANCE.        PT NOTIFIED VIA TELEPHONE.    Detail Level: Zone Detail Level: Generalized Detail Level: Simple
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
## 2025-05-05 RX ORDER — ATORVASTATIN CALCIUM 40 MG/1
40 TABLET, FILM COATED ORAL NIGHTLY
Qty: 90 TABLET | Refills: 0 | Status: SHIPPED | OUTPATIENT
Start: 2025-05-05

## 2025-05-05 RX ORDER — CARVEDILOL 25 MG/1
25 TABLET ORAL 2 TIMES DAILY WITH MEALS
Qty: 180 TABLET | Refills: 1 | Status: SHIPPED | OUTPATIENT
Start: 2025-05-05

## 2025-05-05 RX ORDER — HYDROGEN PEROXIDE 2.65 ML/100ML
81 LIQUID ORAL; TOPICAL DAILY
Qty: 90 TABLET | Refills: 3 | Status: SHIPPED | OUTPATIENT
Start: 2025-05-05

## 2025-05-06 ENCOUNTER — OFFICE VISIT (OUTPATIENT)
Dept: CARDIAC REHAB | Facility: HOSPITAL | Age: 35
End: 2025-05-06
Payer: COMMERCIAL

## 2025-05-06 ENCOUNTER — TELEMEDICINE (OUTPATIENT)
Dept: BEHAVIORAL HEALTH | Facility: CLINIC | Age: 35
End: 2025-05-06
Payer: COMMERCIAL

## 2025-05-06 VITALS
SYSTOLIC BLOOD PRESSURE: 115 MMHG | WEIGHT: 315 LBS | HEART RATE: 90 BPM | HEIGHT: 77 IN | DIASTOLIC BLOOD PRESSURE: 72 MMHG | OXYGEN SATURATION: 96 % | BODY MASS INDEX: 37.19 KG/M2

## 2025-05-06 DIAGNOSIS — I25.2 HISTORY OF ST ELEVATION MYOCARDIAL INFARCTION (STEMI): Primary | ICD-10-CM

## 2025-05-06 DIAGNOSIS — F51.5 NIGHTMARES: ICD-10-CM

## 2025-05-06 DIAGNOSIS — F51.05 INSOMNIA DUE TO OTHER MENTAL DISORDER: ICD-10-CM

## 2025-05-06 DIAGNOSIS — F20.0 SCHIZOPHRENIA, PARANOID TYPE: Primary | ICD-10-CM

## 2025-05-06 DIAGNOSIS — F41.1 GENERALIZED ANXIETY DISORDER: ICD-10-CM

## 2025-05-06 DIAGNOSIS — F99 INSOMNIA DUE TO OTHER MENTAL DISORDER: ICD-10-CM

## 2025-05-06 PROCEDURE — 93798 PHYS/QHP OP CAR RHAB W/ECG: CPT

## 2025-05-06 RX ORDER — FLUOXETINE HYDROCHLORIDE 40 MG/1
40 CAPSULE ORAL EVERY MORNING
Qty: 90 CAPSULE | Refills: 0 | Status: SHIPPED | OUTPATIENT
Start: 2025-05-06

## 2025-05-06 RX ORDER — PALIPERIDONE 9 MG/1
9 TABLET, EXTENDED RELEASE ORAL EVERY MORNING
Qty: 30 TABLET | Refills: 1 | Status: SHIPPED | OUTPATIENT
Start: 2025-05-06

## 2025-05-06 NOTE — PROGRESS NOTES
Phase II and III Education    Discipline Teaching:  Cardiac Rehab Phase II [x]      Cardiac Rehab Phase III []      Pulmonary Rehab II []      Pulmonary Rehab III []      Peripheral Artery Disease []        Class Given:  Asthma Management []      Beginners Cardiac Rehab [x]      Beginners Pulmonary Rehab []      CAD I []      CAD II []      CHF []      Diabetes Mellitus []     Diet & Cholesterol []     Diet Consult []      Energy Conservation []     Exercise []     Grocery Store Tour []     Harmonica Therapy []     High Blood Pressure []     Living with COPD []     Medication Safety []     Peripheral Artery Disease []     S & S of Infection []      Stress []        Education Topics:  Angina [x]      Arrhythmias []      Asthma Management []      Beginning Cardiac Rehab [x]      Blood Pressure [x]     Blood Sugar []     Breathing Retrainment []     CHF []     Cooking [x]     Daily Weight [x]     Diabetes Mellitus []      Diet [x]     Energy Conservation [x]     Exercise [x]      Foot Care []      Grocery Store Tour []      Heart Disease [x]      Heart Function [x]      Incision Care []     Living with COPD []     Medication Safety []     PAD Symptoms/Treatment []     Reconditioning Exercises []     S & S Infection []     Smoking Consult [x]     Stress Management [x]     Test Results []       Teaching Aids:  Video []      PAD Handout []      Pulmonary Workbook []      CAD Teaching Packet [x]      Stress Teaching Packet [x]     Exercise Teaching Packet [x]      Diet Teaching Packet [x]      CHF Teaching Packet []      Blood Pressure Teaching Packet [x]      Smoking Cessation Packet []      Medication Safety Handout []      Pflex Training []      Diabetes Teaching Packet []      Harmonica Therapy Packet []        Teaching Method:  Discussion [x]      Written Information [x]      Audio Visual [x]      Demonstration []        Teaching Recipient:  Patient [x]      Family []      Friend []      Legal Guardian []      Primary  Care Giver []      Significant Other []        Barriers To Learning:  None [x]      Auditory []      Cultural []      Emotional []      Financial []      Language []    Mental []      Motivation []      Physical []      Reading Skills []      Mosque []      Verbal/Cognitive []      Visual []      Written/Cognitive []        Teaching Response:  Verified Via Teach back [x]      Return Demo Via Teach Back []      Reinforcement Needed []      Unable to Return Demo []      Unable to Comprehend []      Declines Teaching  []        Additional Education Topics:      Follow Up Instruction Comment:

## 2025-05-06 NOTE — PROGRESS NOTES
Chief Complaint  Cardiac Rehab Phase II    Anthony Tay presents to [facility]    Physical Exam  Constitutional:       Appearance: He is obese.   Cardiovascular:      Rate and Rhythm: Normal rate and regular rhythm.      Heart sounds: Normal heart sounds, S1 normal and S2 normal.   Pulmonary:      Effort: Pulmonary effort is normal.      Breath sounds: Normal breath sounds and air entry.   Musculoskeletal:      Cervical back: Normal range of motion.      Right lower leg: No edema.      Left lower leg: No edema.   Skin:     General: Skin is warm and dry.   Neurological:      General: No focal deficit present.      Mental Status: He is alert.   Psychiatric:         Attention and Perception: Attention and perception normal.         Mood and Affect: Mood and affect normal.         Speech: Speech normal.         Behavior: Behavior normal. Behavior is cooperative.         Thought Content: Thought content normal.         Cognition and Memory: Cognition and memory normal.         Judgment: Judgment normal.      Result Review :          Assessment and Plan    Diagnoses and all orders for this visit:    1. History of ST elevation myocardial infarction (STEMI) (Primary)        Mainor Sheehan RN

## 2025-05-06 NOTE — PROGRESS NOTES
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 theoretical risk of tardive dyskinesia, and weight gain. Use care when operating vehicle, vessel, or machine. After discussion of these risks and benefits, the patient voiced understanding and agreed to proceed.    Discussed all risks, benefits, alternatives, and side effects of Fluoxetine including but not limited to GI upset, decreased appetite, sexual dysfunction, bleeding risk, seizure risk, insomnia, anxiety, drowsiness, headache, asthenia, tremor, nervousness, activation of kory or hypomania, increased fragility fracture risk, hyponatremia, ocular effects, withdrawal syndrome following abrupt discontinuation, serotonin syndrome, hypersensitivity reaction, and activation of suicidal ideation and behavior.  Pt educated on the need to practice safe sex while taking this med. Discussed the need for pt to immediately call the office for any new or worsening symptoms, such as worsening depression; feeling nervous or restless; suicidal thoughts or actions; or other changes changes in mood or behavior, and all other concerns. Pt educated on med compliance and the risks of suddenly stopping this medication or missing doses. Pt verbalized understanding and is agreeable to taking Fluoxetine. Addressed all questions and concerns.     Discussed all risks, benefits, alternatives, and side effects of  Hydroxyzine including but not limited to dry mouth, sedation, tremor, respiratory depression, acute generalized exanthematous pustulosis, CNS depression, drowsiness, cognitive dysfunction, dizziness, falls risk, prolonged QT interval, torsades de pointes, hallucination, involuntary body movements, seizure, and headache. Pt denies known h/o prolonged QT interval. Pt educated on the need to practice safe sex while taking this med. Discussed the need for pt to immediately call the office for any new or worsening symptoms, such as changes changes in mood or behavior, and all other concerns. Pt verbalized understanding and is agreeable to taking Hydroxyzine. Addressed all questions and concerns.       Follow up:   F/u in 1 month.      TREATMENT PLAN/GOALS: Continue supportive psychotherapy efforts and medications as indicated. Treatment and medication options discussed during today's visit. Patient ackowledged and verbally consented to continue with current treatment plan and was educated on the importance of compliance with treatment and follow-up appointments.    MEDICATION ISSUES:  CORRINE reviewed as expected.  Discussed medication options and treatment plan of prescribed medication as well as the risks, benefits, and side effects including potential falls, possible impaired driving and metabolic adversities among others. Patient is agreeable to call the office with any worsening of symptoms or onset of side effects. Patient is agreeable to call 911 or go to the nearest ER should he/she begin having SI/HI. No medication side effects or related complaints today.            This document has been electronically signed by Melly Giang PA-C  May 6, 2025 10:18 EDT      Part of this note may be an electronic transcription/translation of spoken language to printed text using the Dragon Dictation System.

## 2025-05-08 ENCOUNTER — APPOINTMENT (OUTPATIENT)
Dept: CARDIAC REHAB | Facility: HOSPITAL | Age: 35
End: 2025-05-08
Payer: COMMERCIAL

## 2025-05-13 ENCOUNTER — TREATMENT (OUTPATIENT)
Dept: CARDIAC REHAB | Facility: HOSPITAL | Age: 35
End: 2025-05-13
Payer: COMMERCIAL

## 2025-05-13 DIAGNOSIS — I25.2 HISTORY OF ST ELEVATION MYOCARDIAL INFARCTION (STEMI): Primary | ICD-10-CM

## 2025-05-13 PROCEDURE — 93798 PHYS/QHP OP CAR RHAB W/ECG: CPT

## 2025-05-15 ENCOUNTER — TREATMENT (OUTPATIENT)
Dept: CARDIAC REHAB | Facility: HOSPITAL | Age: 35
End: 2025-05-15
Payer: COMMERCIAL

## 2025-05-15 DIAGNOSIS — I25.2 HISTORY OF ST ELEVATION MYOCARDIAL INFARCTION (STEMI): Primary | ICD-10-CM

## 2025-05-15 PROCEDURE — 93798 PHYS/QHP OP CAR RHAB W/ECG: CPT

## 2025-05-20 ENCOUNTER — TREATMENT (OUTPATIENT)
Dept: CARDIAC REHAB | Facility: HOSPITAL | Age: 35
End: 2025-05-20
Payer: COMMERCIAL

## 2025-05-20 DIAGNOSIS — I25.2 HISTORY OF ST ELEVATION MYOCARDIAL INFARCTION (STEMI): Primary | ICD-10-CM

## 2025-05-20 PROCEDURE — 93798 PHYS/QHP OP CAR RHAB W/ECG: CPT

## 2025-05-22 ENCOUNTER — TREATMENT (OUTPATIENT)
Dept: CARDIAC REHAB | Facility: HOSPITAL | Age: 35
End: 2025-05-22
Payer: COMMERCIAL

## 2025-05-22 DIAGNOSIS — I25.2 HISTORY OF ST ELEVATION MYOCARDIAL INFARCTION (STEMI): Primary | ICD-10-CM

## 2025-05-22 PROCEDURE — 93798 PHYS/QHP OP CAR RHAB W/ECG: CPT

## 2025-05-27 ENCOUNTER — TREATMENT (OUTPATIENT)
Dept: CARDIAC REHAB | Facility: HOSPITAL | Age: 35
End: 2025-05-27
Payer: COMMERCIAL

## 2025-05-27 DIAGNOSIS — I25.2 HISTORY OF ST ELEVATION MYOCARDIAL INFARCTION (STEMI): Primary | ICD-10-CM

## 2025-05-27 PROCEDURE — 93798 PHYS/QHP OP CAR RHAB W/ECG: CPT

## 2025-05-29 ENCOUNTER — TREATMENT (OUTPATIENT)
Dept: CARDIAC REHAB | Facility: HOSPITAL | Age: 35
End: 2025-05-29
Payer: COMMERCIAL

## 2025-05-29 ENCOUNTER — APPOINTMENT (OUTPATIENT)
Dept: CARDIAC REHAB | Facility: HOSPITAL | Age: 35
End: 2025-05-29
Payer: COMMERCIAL

## 2025-05-29 DIAGNOSIS — I25.2 HISTORY OF ST ELEVATION MYOCARDIAL INFARCTION (STEMI): Primary | ICD-10-CM

## 2025-05-29 PROCEDURE — 93798 PHYS/QHP OP CAR RHAB W/ECG: CPT

## 2025-06-03 ENCOUNTER — TREATMENT (OUTPATIENT)
Dept: CARDIAC REHAB | Facility: HOSPITAL | Age: 35
End: 2025-06-03
Payer: COMMERCIAL

## 2025-06-03 DIAGNOSIS — I25.2 HISTORY OF ST ELEVATION MYOCARDIAL INFARCTION (STEMI): Primary | ICD-10-CM

## 2025-06-03 PROCEDURE — 93798 PHYS/QHP OP CAR RHAB W/ECG: CPT

## 2025-06-03 NOTE — PROGRESS NOTES
Phase II and III Education    Discipline Teaching:  Cardiac Rehab Phase II [x]      Cardiac Rehab Phase III []      Pulmonary Rehab II []      Pulmonary Rehab III []      Peripheral Artery Disease []        Class Given:  Asthma Management []      Beginners Cardiac Rehab []      Beginners Pulmonary Rehab []      CAD I []      CAD II []      CHF []      Diabetes Mellitus []     Diet & Cholesterol []     Diet Consult []      Energy Conservation []     Exercise []     Grocery Store Tour []     Harmonica Therapy []     High Blood Pressure []     Living with COPD []     Medication Safety []     Peripheral Artery Disease []     S & S of Infection []      Stress []        Education Topics:  Angina []      Arrhythmias []      Asthma Management []      Beginning Cardiac Rehab []      Blood Pressure []     Blood Sugar []     Breathing Retrainment []     CHF []     Cooking []     Daily Weight []     Diabetes Mellitus []      Diet []     Energy Conservation []     Exercise []      Foot Care []      Grocery Store Tour []      Heart Disease []      Heart Function []      Incision Care []     Living with COPD []     Medication Safety []     PAD Symptoms/Treatment []     Reconditioning Exercises []     S & S Infection []     Smoking Consult [x]     Stress Management []     Test Results []       Teaching Aids:  Video []      PAD Handout []      Pulmonary Workbook []      CAD Teaching Packet []      Stress Teaching Packet []     Exercise Teaching Packet []      Diet Teaching Packet []      CHF Teaching Packet []      Blood Pressure Teaching Packet []      Smoking Cessation Packet [x]      Medication Safety Handout []      Pflex Training []      Diabetes Teaching Packet []      Harmonica Therapy Packet []        Teaching Method:  Discussion [x]      Written Information [x]      Audio Visual []      Demonstration []        Teaching Recipient:  Patient [x]      Family []      Friend []      Legal Guardian []      Primary Care Giver []       Significant Other []        Barriers To Learning:  None [x]      Auditory []      Cultural []      Emotional []      Financial []      Language []    Mental []      Motivation []      Physical []      Reading Skills []      Muslim []      Verbal/Cognitive []      Visual []      Written/Cognitive []        Teaching Response:  Verified Via Teach back []      Return Demo Via Teach Back []      Reinforcement Needed []      Unable to Return Demo []      Unable to Comprehend []      Declines Teaching  []        Additional Education Topics:      Follow Up Instruction Comment:

## 2025-06-05 ENCOUNTER — TREATMENT (OUTPATIENT)
Dept: CARDIAC REHAB | Facility: HOSPITAL | Age: 35
End: 2025-06-05
Payer: COMMERCIAL

## 2025-06-05 DIAGNOSIS — I25.2 HISTORY OF ST ELEVATION MYOCARDIAL INFARCTION (STEMI): Primary | ICD-10-CM

## 2025-06-05 NOTE — PROGRESS NOTES
Pt unable to work out today because he had vertigo. He said he almost fell getting off the [employer] bus, and that when he bent over or got up he saw flashs of light and got dizzy.  He said it started the day before on Wednesday, June 4.  Pt advised to contact his primary caregiver, which he said he would do.  Vanesa called to pick him up, pt assisted to bench outside to wait for ride.

## 2025-06-10 ENCOUNTER — TREATMENT (OUTPATIENT)
Dept: CARDIAC REHAB | Facility: HOSPITAL | Age: 35
End: 2025-06-10
Payer: COMMERCIAL

## 2025-06-10 DIAGNOSIS — I25.2 HISTORY OF ST ELEVATION MYOCARDIAL INFARCTION (STEMI): Primary | ICD-10-CM

## 2025-06-10 PROCEDURE — 93798 PHYS/QHP OP CAR RHAB W/ECG: CPT

## 2025-06-12 ENCOUNTER — TREATMENT (OUTPATIENT)
Dept: CARDIAC REHAB | Facility: HOSPITAL | Age: 35
End: 2025-06-12
Payer: COMMERCIAL

## 2025-06-12 DIAGNOSIS — I25.2 HISTORY OF ST ELEVATION MYOCARDIAL INFARCTION (STEMI): Primary | ICD-10-CM

## 2025-06-12 PROCEDURE — 93798 PHYS/QHP OP CAR RHAB W/ECG: CPT

## 2025-06-13 DIAGNOSIS — F20.0 SCHIZOPHRENIA, PARANOID TYPE: ICD-10-CM

## 2025-06-13 RX ORDER — PALIPERIDONE 6 MG/1
6 TABLET, EXTENDED RELEASE ORAL EVERY MORNING
Qty: 30 TABLET | Refills: 0 | OUTPATIENT
Start: 2025-06-13

## 2025-06-17 ENCOUNTER — TREATMENT (OUTPATIENT)
Dept: CARDIAC REHAB | Facility: HOSPITAL | Age: 35
End: 2025-06-17
Payer: COMMERCIAL

## 2025-06-17 DIAGNOSIS — I25.2 HISTORY OF ST ELEVATION MYOCARDIAL INFARCTION (STEMI): Primary | ICD-10-CM

## 2025-06-17 PROCEDURE — 93798 PHYS/QHP OP CAR RHAB W/ECG: CPT

## 2025-06-19 ENCOUNTER — APPOINTMENT (OUTPATIENT)
Dept: CARDIAC REHAB | Facility: HOSPITAL | Age: 35
End: 2025-06-19
Payer: COMMERCIAL

## 2025-06-23 ENCOUNTER — TELEMEDICINE (OUTPATIENT)
Dept: BEHAVIORAL HEALTH | Facility: CLINIC | Age: 35
End: 2025-06-23
Payer: COMMERCIAL

## 2025-06-23 DIAGNOSIS — F51.5 NIGHTMARES: ICD-10-CM

## 2025-06-23 DIAGNOSIS — F20.0 SCHIZOPHRENIA, PARANOID TYPE: Primary | ICD-10-CM

## 2025-06-23 DIAGNOSIS — F51.05 INSOMNIA DUE TO OTHER MENTAL DISORDER: ICD-10-CM

## 2025-06-23 DIAGNOSIS — F99 INSOMNIA DUE TO OTHER MENTAL DISORDER: ICD-10-CM

## 2025-06-23 DIAGNOSIS — F41.1 GENERALIZED ANXIETY DISORDER: ICD-10-CM

## 2025-06-23 PROCEDURE — 1159F MED LIST DOCD IN RCRD: CPT | Performed by: PHYSICIAN ASSISTANT

## 2025-06-23 PROCEDURE — 99214 OFFICE O/P EST MOD 30 MIN: CPT | Performed by: PHYSICIAN ASSISTANT

## 2025-06-23 PROCEDURE — 96127 BRIEF EMOTIONAL/BEHAV ASSMT: CPT | Performed by: PHYSICIAN ASSISTANT

## 2025-06-23 PROCEDURE — 1160F RVW MEDS BY RX/DR IN RCRD: CPT | Performed by: PHYSICIAN ASSISTANT

## 2025-06-23 RX ORDER — GABAPENTIN 800 MG/1
800 TABLET ORAL NIGHTLY
Qty: 90 TABLET | Refills: 0 | Status: SHIPPED | OUTPATIENT
Start: 2025-06-23 | End: 2025-09-21

## 2025-06-23 RX ORDER — PALIPERIDONE 9 MG/1
9 TABLET, EXTENDED RELEASE ORAL EVERY MORNING
Qty: 30 TABLET | Refills: 1 | Status: SHIPPED | OUTPATIENT
Start: 2025-06-23

## 2025-06-23 RX ORDER — HYDROXYZINE HYDROCHLORIDE 25 MG/1
TABLET, FILM COATED ORAL
Qty: 180 TABLET | Refills: 0 | Status: SHIPPED | OUTPATIENT
Start: 2025-06-23

## 2025-06-23 NOTE — PROGRESS NOTES
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 noncompliance and/or AODA.  Labs/Diagnostics Ordered:   No orders of the defined types were placed in this encounter.    Medications:   New Medications Ordered This Visit   Medications    hydrOXYzine (ATARAX) 25 MG tablet     Sig: TAKE 1 TO 2 TABLETS BY MOUTH ONCE DAILY AS NEEDED SEVERE  ANXIETY     Dispense:  180 tablet     Refill:  0    paliperidone (Invega) 9 MG 24 hr tablet     Sig: Take 1 tablet by mouth Every Morning.     Dispense:  30 tablet     Refill:  1    gabapentin (Neurontin) 800 MG tablet     Sig: Take 1 tablet by mouth Every Night for 90 days.     Dispense:  90 tablet     Refill:  0     Discussed all risks, benefits, alternatives, and side effects of Gabapentin including but not limited to sedation, dizziness, GI upset, dry mouth, and weight gain. Pt instructed to avoid driving and doing other tasks or actions that require to be alert until knowing how the drug affects them.  Pt educated on the need to practice safe sex while taking this med. Discussed the need for pt to immediately call the office for any new or worsening symptoms, such as worsening depression; feeling nervous or restless; suicidal thoughts or actions; or other changes changes in mood or behavior, and all other concerns. Pt educated on med compliance and the risks of suddenly stopping this medication or missing  doses. Pt verbalized understanding and is agreeable to taking Gabapentin. Addressed all questions and concerns.  Will order UDS and obtain CORRINE report. Pt verbally signed controlled substances agreement.    Invega, Risks, benefits, alternatives discussed with patient including nausea and vomiting, GI upset, sedation, akathisia, theoretical risk of tardive dyskinesia, and weight gain. Use care when operating vehicle, vessel, or machine. After discussion of these risks and benefits, the patient voiced understanding and agreed to proceed.    Discussed all risks, benefits, alternatives, and side effects of Fluoxetine including but not limited to GI upset, decreased appetite, sexual dysfunction, bleeding risk, seizure risk, insomnia, anxiety, drowsiness, headache, asthenia, tremor, nervousness, activation of kory or hypomania, increased fragility fracture risk, hyponatremia, ocular effects, withdrawal syndrome following abrupt discontinuation, serotonin syndrome, hypersensitivity reaction, and activation of suicidal ideation and behavior.  Pt educated on the need to practice safe sex while taking this med. Discussed the need for pt to immediately call the office for any new or worsening symptoms, such as worsening depression; feeling nervous or restless; suicidal thoughts or actions; or other changes changes in mood or behavior, and all other concerns. Pt educated on med compliance and the risks of suddenly stopping this medication or missing doses. Pt verbalized understanding and is agreeable to taking Fluoxetine. Addressed all questions and concerns.     Discussed all risks, benefits, alternatives, and side effects of Hydroxyzine including but not limited to dry mouth, sedation, tremor, respiratory depression, acute generalized exanthematous pustulosis, CNS depression, drowsiness, cognitive dysfunction, dizziness, falls risk, prolonged QT interval, torsades de pointes, hallucination, involuntary body movements,  seizure, and headache. Pt denies known h/o prolonged QT interval. Pt educated on the need to practice safe sex while taking this med. Discussed the need for pt to immediately call the office for any new or worsening symptoms, such as changes changes in mood or behavior, and all other concerns. Pt verbalized understanding and is agreeable to taking Hydroxyzine. Addressed all questions and concerns.       Follow up:   F/u in 1 month.      TREATMENT PLAN/GOALS: Continue supportive psychotherapy efforts and medications as indicated. Treatment and medication options discussed during today's visit. Patient ackowledged and verbally consented to continue with current treatment plan and was educated on the importance of compliance with treatment and follow-up appointments.    MEDICATION ISSUES:  CORRINE reviewed as expected.  Discussed medication options and treatment plan of prescribed medication as well as the risks, benefits, and side effects including potential falls, possible impaired driving and metabolic adversities among others. Patient is agreeable to call the office with any worsening of symptoms or onset of side effects. Patient is agreeable to call 911 or go to the nearest ER should he/she begin having SI/HI. No medication side effects or related complaints today.            This document has been electronically signed by Melly Giang PA-C  June 23, 2025 11:53 EDT      Part of this note may be an electronic transcription/translation of spoken language to printed text using the Dragon Dictation System.

## 2025-06-24 ENCOUNTER — TREATMENT (OUTPATIENT)
Dept: CARDIAC REHAB | Facility: HOSPITAL | Age: 35
End: 2025-06-24
Payer: COMMERCIAL

## 2025-06-24 DIAGNOSIS — I25.2 HISTORY OF ST ELEVATION MYOCARDIAL INFARCTION (STEMI): Primary | ICD-10-CM

## 2025-06-24 PROCEDURE — 93798 PHYS/QHP OP CAR RHAB W/ECG: CPT

## 2025-06-26 ENCOUNTER — TREATMENT (OUTPATIENT)
Dept: CARDIAC REHAB | Facility: HOSPITAL | Age: 35
End: 2025-06-26
Payer: COMMERCIAL

## 2025-06-26 DIAGNOSIS — I25.2 HISTORY OF ST ELEVATION MYOCARDIAL INFARCTION (STEMI): Primary | ICD-10-CM

## 2025-06-26 PROCEDURE — 93798 PHYS/QHP OP CAR RHAB W/ECG: CPT

## 2025-07-14 DIAGNOSIS — I10 ESSENTIAL (PRIMARY) HYPERTENSION: ICD-10-CM

## 2025-07-14 DIAGNOSIS — F20.0 SCHIZOPHRENIA, PARANOID TYPE: ICD-10-CM

## 2025-07-14 RX ORDER — PALIPERIDONE 6 MG/1
6 TABLET, EXTENDED RELEASE ORAL EVERY MORNING
Qty: 30 TABLET | Refills: 0 | OUTPATIENT
Start: 2025-07-14

## 2025-07-15 RX ORDER — CLONIDINE HYDROCHLORIDE 0.2 MG/1
0.2 TABLET ORAL 2 TIMES DAILY
Qty: 180 TABLET | Refills: 0 | Status: SHIPPED | OUTPATIENT
Start: 2025-07-15

## 2025-07-15 RX ORDER — ATORVASTATIN CALCIUM 40 MG/1
40 TABLET, FILM COATED ORAL NIGHTLY
Qty: 90 TABLET | Refills: 3 | Status: SHIPPED | OUTPATIENT
Start: 2025-07-15

## 2025-07-28 RX ORDER — AMLODIPINE BESYLATE 10 MG/1
10 TABLET ORAL DAILY
Qty: 90 TABLET | Refills: 0 | Status: SHIPPED | OUTPATIENT
Start: 2025-07-28

## 2025-07-30 ENCOUNTER — TELEMEDICINE (OUTPATIENT)
Dept: PSYCHIATRY | Facility: CLINIC | Age: 35
End: 2025-07-30
Payer: COMMERCIAL

## 2025-07-30 DIAGNOSIS — F51.5 NIGHTMARES: ICD-10-CM

## 2025-07-30 DIAGNOSIS — F51.05 INSOMNIA DUE TO OTHER MENTAL DISORDER: ICD-10-CM

## 2025-07-30 DIAGNOSIS — F99 INSOMNIA DUE TO OTHER MENTAL DISORDER: ICD-10-CM

## 2025-07-30 DIAGNOSIS — F41.1 GENERALIZED ANXIETY DISORDER: ICD-10-CM

## 2025-07-30 DIAGNOSIS — F20.0 SCHIZOPHRENIA, PARANOID TYPE: Primary | ICD-10-CM

## 2025-07-30 RX ORDER — GABAPENTIN 800 MG/1
800 TABLET ORAL NIGHTLY
Qty: 90 TABLET | Refills: 0 | Status: SHIPPED | OUTPATIENT
Start: 2025-07-30 | End: 2025-10-28

## 2025-07-30 RX ORDER — FLUOXETINE HYDROCHLORIDE 40 MG/1
40 CAPSULE ORAL EVERY MORNING
Qty: 90 CAPSULE | Refills: 0 | Status: SHIPPED | OUTPATIENT
Start: 2025-07-30

## 2025-07-30 RX ORDER — PALIPERIDONE 9 MG/1
9 TABLET, EXTENDED RELEASE ORAL EVERY MORNING
Qty: 30 TABLET | Refills: 1 | Status: SHIPPED | OUTPATIENT
Start: 2025-07-30

## 2025-07-30 NOTE — PROGRESS NOTES
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 suicide attempt in the past, and recent psychosocial stressors (pandemic). Protective factors include denies access to guns/weapons, no present SI, no history of self-harm in the past, minimal AODA, healthcare seeking, future orientation, willingness to engage in care.  Risk of self-harm chronically is also moderate, but could be further elevated in the event of treatment noncompliance and/or AODA.  Labs/Diagnostics Ordered:   No orders of the defined types were placed in this encounter.    Medications:   New Medications Ordered This Visit   Medications    FLUoxetine (PROzac) 40 MG capsule     Sig: Take 1 capsule by mouth Every Morning.     Dispense:  90 capsule     Refill:  0    gabapentin (Neurontin) 800 MG tablet     Sig: Take 1 tablet by mouth Every Night for 90 days.     Dispense:  90 tablet     Refill:  0    paliperidone (Invega) 9 MG 24 hr tablet     Sig: Take 1 tablet by mouth Every Morning.     Dispense:  30 tablet     Refill:  1     Discussed all risks, benefits, alternatives, and side effects of Gabapentin including but not limited to sedation, dizziness, GI upset, dry mouth, and weight gain. Pt instructed to avoid driving and doing other tasks or actions that require to be alert until knowing how the drug affects them.  Pt educated on the need to practice safe sex while taking this med. Discussed the need for pt to  immediately call the office for any new or worsening symptoms, such as worsening depression; feeling nervous or restless; suicidal thoughts or actions; or other changes changes in mood or behavior, and all other concerns. Pt educated on med compliance and the risks of suddenly stopping this medication or missing doses. Pt verbalized understanding and is agreeable to taking Gabapentin. Addressed all questions and concerns.  Will order UDS and obtain CORRINE report. Pt verbally signed controlled substances agreement.    Invega, Risks, benefits, alternatives discussed with patient including nausea and vomiting, GI upset, sedation, akathisia, theoretical risk of tardive dyskinesia, and weight gain. Use care when operating vehicle, vessel, or machine. After discussion of these risks and benefits, the patient voiced understanding and agreed to proceed.    Discussed all risks, benefits, alternatives, and side effects of Fluoxetine including but not limited to GI upset, decreased appetite, sexual dysfunction, bleeding risk, seizure risk, insomnia, anxiety, drowsiness, headache, asthenia, tremor, nervousness, activation of kory or hypomania, increased fragility fracture risk, hyponatremia, ocular effects, withdrawal syndrome following abrupt discontinuation, serotonin syndrome, hypersensitivity reaction, and activation of suicidal ideation and behavior.  Pt educated on the need to practice safe sex while taking this med. Discussed the need for pt to immediately call the office for any new or worsening symptoms, such as worsening depression; feeling nervous or restless; suicidal thoughts or actions; or other changes changes in mood or behavior, and all other concerns. Pt educated on med compliance and the risks of suddenly stopping this medication or missing doses. Pt verbalized understanding and is agreeable to taking Fluoxetine. Addressed all questions and concerns.     Discussed all risks, benefits, alternatives, and side  effects of Hydroxyzine including but not limited to dry mouth, sedation, tremor, respiratory depression, acute generalized exanthematous pustulosis, CNS depression, drowsiness, cognitive dysfunction, dizziness, falls risk, prolonged QT interval, torsades de pointes, hallucination, involuntary body movements, seizure, and headache. Pt denies known h/o prolonged QT interval. Pt educated on the need to practice safe sex while taking this med. Discussed the need for pt to immediately call the office for any new or worsening symptoms, such as changes changes in mood or behavior, and all other concerns. Pt verbalized understanding and is agreeable to taking Hydroxyzine. Addressed all questions and concerns.       Follow up:   F/u in 1 month.      TREATMENT PLAN/GOALS: Continue supportive psychotherapy efforts and medications as indicated. Treatment and medication options discussed during today's visit. Patient ackowledged and verbally consented to continue with current treatment plan and was educated on the importance of compliance with treatment and follow-up appointments.    MEDICATION ISSUES:  CORRINE reviewed as expected.  Discussed medication options and treatment plan of prescribed medication as well as the risks, benefits, and side effects including potential falls, possible impaired driving and metabolic adversities among others. Patient is agreeable to call the office with any worsening of symptoms or onset of side effects. Patient is agreeable to call 911 or go to the nearest ER should he/she begin having SI/HI. No medication side effects or related complaints today.            This document has been electronically signed by Melly Giang PA-C  July 30, 2025 11:07 EDT      Part of this note may be an electronic transcription/translation of spoken language to printed text using the Dragon Dictation System.

## (undated) DEVICE — CATH F5INF TLPIGST145 110CM6SH: Brand: INFINITI

## (undated) DEVICE — GLIDESHEATH SLENDER STAINLESS STEEL KIT: Brand: GLIDESHEATH SLENDER

## (undated) DEVICE — PTCA DILATATION CATHETER: Brand: NC EMERGE®

## (undated) DEVICE — HI-TORQUE BALANCE MIDDLEWEIGHT UNIVERSAL GUIDE WIRE .014 STRAIGHT TIP 3.0 CM X 190 CM: Brand: HI-TORQUE BALANCE MIDDLEWEIGHT UNIVERSAL

## (undated) DEVICE — 6F .070 JR4 ECO PK: Brand: VISTA BRITE TIP

## (undated) DEVICE — NC TREK NEO™ CORONARY DILATATION CATHETER 2.25 MM X 12 MM / RAPID-EXCHANGE: Brand: NC TREK NEO™

## (undated) DEVICE — HEARTRAIL III GUIDING CATHETER: Brand: HEARTRAIL

## (undated) DEVICE — NC TREK NEO™ CORONARY DILATATION CATHETER 3.75 MM X 15 MM / RAPID-EXCHANGE: Brand: NC TREK NEO™

## (undated) DEVICE — GW INQWIRE FC PTFE STD J/1.5 .035 260